# Patient Record
Sex: FEMALE | Race: ASIAN | NOT HISPANIC OR LATINO | URBAN - METROPOLITAN AREA
[De-identification: names, ages, dates, MRNs, and addresses within clinical notes are randomized per-mention and may not be internally consistent; named-entity substitution may affect disease eponyms.]

---

## 2020-05-02 ENCOUNTER — EMERGENCY (EMERGENCY)
Facility: HOSPITAL | Age: 30
LOS: 1 days | Discharge: HOME | End: 2020-05-02
Attending: EMERGENCY MEDICINE | Admitting: EMERGENCY MEDICINE
Payer: MEDICAID

## 2020-05-02 VITALS
RESPIRATION RATE: 15 BRPM | HEART RATE: 80 BPM | OXYGEN SATURATION: 99 % | TEMPERATURE: 99 F | DIASTOLIC BLOOD PRESSURE: 71 MMHG | SYSTOLIC BLOOD PRESSURE: 120 MMHG

## 2020-05-02 VITALS
HEART RATE: 100 BPM | RESPIRATION RATE: 16 BRPM | OXYGEN SATURATION: 100 % | TEMPERATURE: 98 F | SYSTOLIC BLOOD PRESSURE: 128 MMHG | DIASTOLIC BLOOD PRESSURE: 58 MMHG

## 2020-05-02 LAB
APPEARANCE UR: SIGNIFICANT CHANGE UP
BILIRUB UR-MCNC: NEGATIVE — SIGNIFICANT CHANGE UP
BLOOD UR QL VISUAL: NEGATIVE — SIGNIFICANT CHANGE UP
COLOR SPEC: YELLOW — SIGNIFICANT CHANGE UP
GLUCOSE UR-MCNC: NEGATIVE — SIGNIFICANT CHANGE UP
HCG UR-SCNC: POSITIVE — SIGNIFICANT CHANGE UP
KETONES UR-MCNC: NEGATIVE — SIGNIFICANT CHANGE UP
LEUKOCYTE ESTERASE UR-ACNC: SIGNIFICANT CHANGE UP
NITRITE UR-MCNC: NEGATIVE — SIGNIFICANT CHANGE UP
PH UR: 7.5 — SIGNIFICANT CHANGE UP (ref 5–8)
PROT UR-MCNC: SIGNIFICANT CHANGE UP
SP GR SPEC: 1.02 — SIGNIFICANT CHANGE UP (ref 1–1.04)
SP GR UR: 1.02 — SIGNIFICANT CHANGE UP (ref 1–1.04)
UROBILINOGEN FLD QL: NORMAL — SIGNIFICANT CHANGE UP

## 2020-05-02 PROCEDURE — 99285 EMERGENCY DEPT VISIT HI MDM: CPT

## 2020-05-02 RX ORDER — FAMOTIDINE 10 MG/ML
20 INJECTION INTRAVENOUS ONCE
Refills: 0 | Status: COMPLETED | OUTPATIENT
Start: 2020-05-02 | End: 2020-05-02

## 2020-05-02 RX ADMIN — Medication 30 MILLILITER(S): at 12:39

## 2020-05-02 RX ADMIN — FAMOTIDINE 20 MILLIGRAM(S): 10 INJECTION INTRAVENOUS at 12:39

## 2020-05-02 NOTE — ED PROVIDER NOTE - NSFOLLOWUPCLINICS_GEN_ALL_ED_FT
[facility] Gynecology and Obstetrics  Gynceology/OB  [address] [place] [address]  Phone: [phone]  Fax:   Follow Up Time: Urgent

## 2020-05-02 NOTE — ED ADULT NURSE NOTE - OBJECTIVE STATEMENT
Pt presents to ED with epigastric abdominal pain for the past 3 days. Pt AxOx3, ambulatory. pt states the pain is burning in nature and only bothers her after she eats. Pt denies other complaints at this time. pt denies abd pain, n/v/d. pt denies dysuria and hematuria. Skin clean dry and intact. Meds given as per MD orders. MD at bedside performing ultrasound. Will continue to monitor.

## 2020-05-02 NOTE — ED PROVIDER NOTE - PATIENT PORTAL LINK FT
You can access the FollowMyHealth Patient Portal offered by [facility] by registering at the following website: [email or web address]. By joining [employer]’s FollowMyHealth portal, you will also be able to view your health information using other applications (apps) compatible with our system.

## 2020-05-02 NOTE — ED PROVIDER NOTE - OBJECTIVE STATEMENT
30y/o Romanian speaking F  w/ no medical problems p/w epigastric pain. Pt states that she has been gaining weight and having increased abdominal swelling for the past few months a/w epigastric pain for the past 2 weeks. Pt states pain worse with food intake, better when not eating. LMP 19. Has 1 living child in [facility]. [place] from sept[place]. Denies fevers, chills, nausea, vomiting, chest pain, cough, sob, back pain, dysuria, numbness, tingling, vaginal bleeding, sexual activity, smoking, alcohol intake. 28y/o Yi speaking F  w/ no medical problems p/w epigastric pain. Pt states that she has been gaining weight and having increased abdominal swelling for the past few months a/w epigastric pain for the past 2 weeks. Pt states pain worse with food intake, better when not eating. LMP 19. Has 1 living child in [place] [facility]. [place] from sept[place]. Denies trauma fevers, chills, nausea, vomiting, chest pain, cough, sob, back pain, dysuria, numbness, tingling, vaginal bleeding, sexual activity, smoking, alcohol intake.

## 2020-05-02 NOTE — ED ADULT TRIAGE NOTE - CHIEF COMPLAINT QUOTE
epigastric pain x 3 wks. denies n,v,problems urinating.   pain increases upon eating. denies fever,cough.  lmp 11/7 depo injection

## 2020-05-02 NOTE — ED PROVIDER NOTE - CLINICAL SUMMARY MEDICAL DECISION MAKING FREE TEXT BOX
Maria Esther: No PMH, p/w abd swelling over months and epig pain, worse w/ eating, better w/o food. Last sexual activity ~7 months ago. Suprapubic tenderness. Urine HCG positive. Check US, give GERD treatment, check UA and Cx.

## 2020-05-02 NOTE — ED PROVIDER NOTE - NS ED ROS FT
GENERAL: denies fever, chills  HEENT: denies headaches, lightheadedness, dizziness  CARDIAC: denies chest pain, palpitations  PULM: denies SOB, cough  GI: + abdominal pain; denies nausea, vomiting, diarrhea, constipation, melena, hematochezia  : denies dysuria, frequency, incontinence, hematuria  NEURO: denies motor weakness, sensory changes  MSK: denies joint, muscle pain  SKIN: denies new rashes, hives  HEME: denies active bleeding, bruising

## 2020-05-02 NOTE — ED PROVIDER NOTE - ATTENDING CONTRIBUTION TO CARE
I performed a face-to-face evaluation of the patient and performed a history and physical examination. I agree with the history and physical examination.    No PMH, p/w abd swelling over months and epig pain, worse w/ eating, better w/o food. Last sexual activity ~7 months ago. Suprapubic tenderness. Urine HCG positive. Check US, give GERD treatment, check UA and Cx.

## 2020-05-02 NOTE — ED PROVIDER NOTE - PROGRESS NOTE DETAILS
Pt informed of positive urine pregnancy and viable fetus on POCUS. Pt states pregnancy unwanted and looking for options. Will contact ob for official consult.   -Eugene Saba, PGY-1 spoke with obgyn consult resident who states due to her last menstrual period not eligible for elective  at [facility]. pt needs to contact other facility to see if they can perform elective  elsewhere.   -Eugene Saba, PGY-1 spoke with obgyn consult resident who states due to her last menstrual period not eligible for elective  at [facility]. pt needs to contact other facility to see if they can perform elective  elsewhere. pt cannot get ultrasound in house because she is over 20wks pregnant.  -Eugene Saba, PGY-1 Pt abdominal pain improved after medications Pt abdominal pain improved after medications.  [id number] used to explain pregnancy and what discharge instructions were necessary going forward. VSS. Time was taken to answer all of questions and concerns.  -Eugene Saba, PGY-1

## 2020-05-02 NOTE — ED PROVIDER NOTE - PHYSICAL EXAMINATION
GENERAL: no acute distress, non-toxic appearing, AAOx3  HEAD: normocephalic, atraumatic  HEENT: normal conjunctiva, oral mucosa moist, neck supple  CARDIAC: tachycardia and regular rhythm, normal S1 and S2, no appreciable murmurs  PULM: normal breath sounds, clear to ascultation bilaterally, no rales, rhonchi, or wheezing  GI: gravid abdomen nondistended, soft, nontender, no guarding or rebound tenderness  : +mild suprapubic tenderness, no CVA tenderness b/l  NEURO: no focal motor or sensory deficits  MSK: no peripheral edema, no calf tenderness b/l, no visible deformities  SKIN: well-perfused, extremities warm, no visible rashes  PSYCH: appropriate mood and affect GENERAL: no acute distress, non-toxic appearing, AAOx3  HEAD: normocephalic, atraumatic  HEENT: normal conjunctiva, oral mucosa moist, neck supple  CARDIAC: tachycardia and regular rhythm, normal S1 and S2, no appreciable murmurs  PULM: normal breath sounds, clear to ascultation bilaterally, no rales, rhonchi, or wheezing  GI: +gravid abdomen nondistended, soft, nontender, no guarding or rebound tenderness  : +mild suprapubic tenderness, no CVA tenderness b/l  NEURO: no focal motor or sensory deficits  MSK: no peripheral edema, no calf tenderness b/l, no visible deformities  SKIN: well-perfused, extremities warm, no visible rashes  PSYCH: appropriate mood and affect

## 2020-05-02 NOTE — ED PROVIDER NOTE - NSFOLLOWUPINSTRUCTIONS_ED_ALL_ED_FT
Testing Unit Please call  Testing Unit [phone] for outpatient appointment on Monday    Please call [place] [phone] or [phone] TODAY to find options for further care Please call  Testing Unit [phone] for outpatient appointment on Monday    Please call [place] [phone] or [phone] to find options for further care TODAY Please call  Testing Unit [phone] for outpatient appointment on Monday    Please call [place] [phone] or [phone] to find options for further care TODAY    Please take OTC Maalox and Pepcid for pain relief    SEEK IMMEDIATE MEDICAL CARE IF YOU HAVE ANY OF THE FOLLOWING SYMPTOMS: heavy vaginal bleeding, flow containing, clots, severe low back or abdominal cramps, fever/chills, develop a rash, lose consciousness, or lightheadedness/dizziness.

## 2020-05-03 LAB
CULTURE RESULTS: SIGNIFICANT CHANGE UP
SPECIMEN SOURCE: SIGNIFICANT CHANGE UP

## 2020-06-18 ENCOUNTER — RESULT REVIEW (OUTPATIENT)
Age: 30
End: 2020-06-18

## 2020-06-19 ENCOUNTER — APPOINTMENT (OUTPATIENT)
Dept: OBGYN | Facility: CLINIC | Age: 30
End: 2020-06-19
Payer: MEDICAID

## 2020-06-19 ENCOUNTER — OUTPATIENT (OUTPATIENT)
Dept: OUTPATIENT SERVICES | Facility: HOSPITAL | Age: 30
LOS: 1 days | End: 2020-06-19
Payer: MEDICAID

## 2020-06-19 ENCOUNTER — NON-APPOINTMENT (OUTPATIENT)
Age: 30
End: 2020-06-19

## 2020-06-19 VITALS
HEART RATE: 98 BPM | SYSTOLIC BLOOD PRESSURE: 103 MMHG | OXYGEN SATURATION: 98 % | DIASTOLIC BLOOD PRESSURE: 69 MMHG | BODY MASS INDEX: 30.18 KG/M2 | WEIGHT: 164 LBS | HEIGHT: 62 IN | RESPIRATION RATE: 18 BRPM | TEMPERATURE: 98.5 F

## 2020-06-19 DIAGNOSIS — Z34.00 ENCOUNTER FOR SUPERVISION OF NORMAL FIRST PREGNANCY, UNSPECIFIED TRIMESTER: ICD-10-CM

## 2020-06-19 PROCEDURE — 81001 URINALYSIS AUTO W/SCOPE: CPT

## 2020-06-19 PROCEDURE — 88175 CYTOPATH C/V AUTO FLUID REDO: CPT

## 2020-06-19 PROCEDURE — 86850 RBC ANTIBODY SCREEN: CPT

## 2020-06-19 PROCEDURE — 81003 URINALYSIS AUTO W/O SCOPE: CPT

## 2020-06-19 PROCEDURE — 81025 URINE PREGNANCY TEST: CPT

## 2020-06-19 PROCEDURE — 86900 BLOOD TYPING SEROLOGIC ABO: CPT

## 2020-06-19 PROCEDURE — 83036 HEMOGLOBIN GLYCOSYLATED A1C: CPT

## 2020-06-19 PROCEDURE — 87086 URINE CULTURE/COLONY COUNT: CPT

## 2020-06-19 PROCEDURE — 36415 COLL VENOUS BLD VENIPUNCTURE: CPT

## 2020-06-19 PROCEDURE — 86901 BLOOD TYPING SEROLOGIC RH(D): CPT

## 2020-06-19 PROCEDURE — 99203 OFFICE O/P NEW LOW 30 MIN: CPT | Mod: TH

## 2020-06-19 PROCEDURE — 81220 CFTR GENE COM VARIANTS: CPT

## 2020-06-19 PROCEDURE — G0463: CPT

## 2020-06-19 RX ORDER — PNV NO.95/FERROUS FUM/FOLIC AC 28MG-0.8MG
TABLET ORAL
Refills: 0 | Status: COMPLETED | COMMUNITY
End: 2020-06-19

## 2020-06-20 LAB
A1C WITH ESTIMATED AVERAGE GLUCOSE RESULT: 5.3 % — SIGNIFICANT CHANGE UP (ref 4–5.6)
APPEARANCE UR: ABNORMAL
BACTERIA # UR AUTO: NEGATIVE — SIGNIFICANT CHANGE UP
BILIRUB UR-MCNC: NEGATIVE — SIGNIFICANT CHANGE UP
COD CRY URNS QL: ABNORMAL
COLOR SPEC: SIGNIFICANT CHANGE UP
DIFF PNL FLD: NEGATIVE — SIGNIFICANT CHANGE UP
EPI CELLS # UR: >27 /HPF — HIGH (ref 0–5)
ESTIMATED AVERAGE GLUCOSE: 105 MG/DL — SIGNIFICANT CHANGE UP (ref 68–114)
GLUCOSE UR QL: NEGATIVE — SIGNIFICANT CHANGE UP
HYALINE CASTS # UR AUTO: 0 /LPF — SIGNIFICANT CHANGE UP (ref 0–7)
KETONES UR-MCNC: NEGATIVE — SIGNIFICANT CHANGE UP
LEUKOCYTE ESTERASE UR-ACNC: NEGATIVE — SIGNIFICANT CHANGE UP
NITRITE UR-MCNC: NEGATIVE — SIGNIFICANT CHANGE UP
PH UR: 6.5 — SIGNIFICANT CHANGE UP (ref 5–8)
PROT UR-MCNC: NEGATIVE — SIGNIFICANT CHANGE UP
RBC CASTS # UR COMP ASSIST: 2 /HPF — SIGNIFICANT CHANGE UP (ref 0–4)
SP GR SPEC: 1.01 — SIGNIFICANT CHANGE UP (ref 1.01–1.02)
UROBILINOGEN FLD QL: SIGNIFICANT CHANGE UP
WBC UR QL: 3 /HPF — SIGNIFICANT CHANGE UP (ref 0–5)

## 2020-06-22 DIAGNOSIS — O09.33 SUPERVISION OF PREGNANCY WITH INSUFFICIENT ANTENATAL CARE, THIRD TRIMESTER: ICD-10-CM

## 2020-06-22 DIAGNOSIS — O99.019 ANEMIA COMPLICATING PREGNANCY, UNSPECIFIED TRIMESTER: ICD-10-CM

## 2020-06-22 DIAGNOSIS — Z3A.33 33 WEEKS GESTATION OF PREGNANCY: ICD-10-CM

## 2020-06-22 DIAGNOSIS — Z34.93 ENCOUNTER FOR SUPERVISION OF NORMAL PREGNANCY, UNSPECIFIED, THIRD TRIMESTER: ICD-10-CM

## 2020-06-22 DIAGNOSIS — Z23 ENCOUNTER FOR IMMUNIZATION: ICD-10-CM

## 2020-06-22 LAB
CULTURE RESULTS: SIGNIFICANT CHANGE UP
SPECIMEN SOURCE: SIGNIFICANT CHANGE UP

## 2020-06-23 ENCOUNTER — ASOB RESULT (OUTPATIENT)
Age: 30
End: 2020-06-23

## 2020-06-23 ENCOUNTER — APPOINTMENT (OUTPATIENT)
Dept: ANTEPARTUM | Facility: CLINIC | Age: 30
End: 2020-06-23
Payer: MEDICAID

## 2020-06-23 PROCEDURE — 76805 OB US >/= 14 WKS SNGL FETUS: CPT

## 2020-06-29 LAB — CYSTIC FIBROSIS EXPANDED PANEL: SIGNIFICANT CHANGE UP

## 2020-07-01 ENCOUNTER — NON-APPOINTMENT (OUTPATIENT)
Age: 30
End: 2020-07-01

## 2020-07-01 ENCOUNTER — APPOINTMENT (OUTPATIENT)
Dept: OBGYN | Facility: CLINIC | Age: 30
End: 2020-07-01
Payer: MEDICAID

## 2020-07-01 ENCOUNTER — OUTPATIENT (OUTPATIENT)
Dept: OUTPATIENT SERVICES | Facility: HOSPITAL | Age: 30
LOS: 1 days | End: 2020-07-01
Payer: MEDICAID

## 2020-07-01 VITALS
TEMPERATURE: 98.3 F | RESPIRATION RATE: 18 BRPM | HEIGHT: 62 IN | DIASTOLIC BLOOD PRESSURE: 77 MMHG | WEIGHT: 167.2 LBS | OXYGEN SATURATION: 99 % | SYSTOLIC BLOOD PRESSURE: 114 MMHG | BODY MASS INDEX: 30.77 KG/M2 | HEART RATE: 98 BPM

## 2020-07-01 DIAGNOSIS — Z34.00 ENCOUNTER FOR SUPERVISION OF NORMAL FIRST PREGNANCY, UNSPECIFIED TRIMESTER: ICD-10-CM

## 2020-07-01 LAB
BASOPHILS # BLD AUTO: 0.02 K/UL — SIGNIFICANT CHANGE UP (ref 0–0.2)
BASOPHILS NFR BLD AUTO: 0.2 % — SIGNIFICANT CHANGE UP (ref 0–2)
EOSINOPHIL # BLD AUTO: 0.07 K/UL — SIGNIFICANT CHANGE UP (ref 0–0.5)
EOSINOPHIL NFR BLD AUTO: 0.8 % — SIGNIFICANT CHANGE UP (ref 0–6)
HCT VFR BLD CALC: 34.9 % — SIGNIFICANT CHANGE UP (ref 34.5–45)
HGB BLD-MCNC: 11.2 G/DL — LOW (ref 11.5–15.5)
HIV 1+2 AB+HIV1 P24 AG SERPL QL IA: SIGNIFICANT CHANGE UP
IMM GRANULOCYTES NFR BLD AUTO: 1.3 % — SIGNIFICANT CHANGE UP (ref 0–1.5)
LYMPHOCYTES # BLD AUTO: 1.89 K/UL — SIGNIFICANT CHANGE UP (ref 1–3.3)
LYMPHOCYTES # BLD AUTO: 20.6 % — SIGNIFICANT CHANGE UP (ref 13–44)
MCHC RBC-ENTMCNC: 30.3 PG — SIGNIFICANT CHANGE UP (ref 27–34)
MCHC RBC-ENTMCNC: 32.1 GM/DL — SIGNIFICANT CHANGE UP (ref 32–36)
MCV RBC AUTO: 94.3 FL — SIGNIFICANT CHANGE UP (ref 80–100)
MONOCYTES # BLD AUTO: 0.61 K/UL — SIGNIFICANT CHANGE UP (ref 0–0.9)
MONOCYTES NFR BLD AUTO: 6.6 % — SIGNIFICANT CHANGE UP (ref 2–14)
NEUTROPHILS # BLD AUTO: 6.47 K/UL — SIGNIFICANT CHANGE UP (ref 1.8–7.4)
NEUTROPHILS NFR BLD AUTO: 70.5 % — SIGNIFICANT CHANGE UP (ref 43–77)
PLATELET # BLD AUTO: 220 K/UL — SIGNIFICANT CHANGE UP (ref 150–400)
RBC # BLD: 3.7 M/UL — LOW (ref 3.8–5.2)
RBC # FLD: 13.7 % — SIGNIFICANT CHANGE UP (ref 10.3–14.5)
WBC # BLD: 9.18 K/UL — SIGNIFICANT CHANGE UP (ref 3.8–10.5)
WBC # FLD AUTO: 9.18 K/UL — SIGNIFICANT CHANGE UP (ref 3.8–10.5)

## 2020-07-01 PROCEDURE — 87653 STREP B DNA AMP PROBE: CPT

## 2020-07-01 PROCEDURE — G0463: CPT

## 2020-07-01 PROCEDURE — 81003 URINALYSIS AUTO W/O SCOPE: CPT

## 2020-07-01 PROCEDURE — 87389 HIV-1 AG W/HIV-1&-2 AB AG IA: CPT

## 2020-07-01 PROCEDURE — 86780 TREPONEMA PALLIDUM: CPT

## 2020-07-01 PROCEDURE — 99213 OFFICE O/P EST LOW 20 MIN: CPT | Mod: TH

## 2020-07-01 PROCEDURE — 87491 CHLMYD TRACH DNA AMP PROBE: CPT

## 2020-07-01 PROCEDURE — 85027 COMPLETE CBC AUTOMATED: CPT

## 2020-07-01 PROCEDURE — 87591 N.GONORRHOEAE DNA AMP PROB: CPT

## 2020-07-02 DIAGNOSIS — O09.33 SUPERVISION OF PREGNANCY WITH INSUFFICIENT ANTENATAL CARE, THIRD TRIMESTER: ICD-10-CM

## 2020-07-02 DIAGNOSIS — Z3A.35 35 WEEKS GESTATION OF PREGNANCY: ICD-10-CM

## 2020-07-02 DIAGNOSIS — O99.019 ANEMIA COMPLICATING PREGNANCY, UNSPECIFIED TRIMESTER: ICD-10-CM

## 2020-07-02 LAB
GROUP B BETA STREP DNA (PCR): SIGNIFICANT CHANGE UP
GROUP B BETA STREP INTERPRETATION: SIGNIFICANT CHANGE UP
SOURCE GROUP B STREP: SIGNIFICANT CHANGE UP
T PALLIDUM AB TITR SER: NEGATIVE — SIGNIFICANT CHANGE UP

## 2020-07-03 LAB
C TRACH RRNA SPEC QL NAA+PROBE: SIGNIFICANT CHANGE UP
N GONORRHOEA RRNA SPEC QL NAA+PROBE: SIGNIFICANT CHANGE UP
SPECIMEN SOURCE: SIGNIFICANT CHANGE UP

## 2020-07-15 ENCOUNTER — OUTPATIENT (OUTPATIENT)
Dept: OUTPATIENT SERVICES | Facility: HOSPITAL | Age: 30
LOS: 1 days | End: 2020-07-15
Payer: MEDICAID

## 2020-07-15 ENCOUNTER — NON-APPOINTMENT (OUTPATIENT)
Age: 30
End: 2020-07-15

## 2020-07-15 ENCOUNTER — APPOINTMENT (OUTPATIENT)
Dept: OBGYN | Facility: CLINIC | Age: 30
End: 2020-07-15
Payer: MEDICAID

## 2020-07-15 VITALS
SYSTOLIC BLOOD PRESSURE: 117 MMHG | HEIGHT: 62 IN | BODY MASS INDEX: 31.65 KG/M2 | DIASTOLIC BLOOD PRESSURE: 82 MMHG | WEIGHT: 172 LBS

## 2020-07-15 VITALS — TEMPERATURE: 98.1 F | RESPIRATION RATE: 17 BRPM | OXYGEN SATURATION: 99 %

## 2020-07-15 DIAGNOSIS — Z34.00 ENCOUNTER FOR SUPERVISION OF NORMAL FIRST PREGNANCY, UNSPECIFIED TRIMESTER: ICD-10-CM

## 2020-07-15 PROCEDURE — 81003 URINALYSIS AUTO W/O SCOPE: CPT

## 2020-07-15 PROCEDURE — 99213 OFFICE O/P EST LOW 20 MIN: CPT | Mod: TH

## 2020-07-15 PROCEDURE — G0463: CPT

## 2020-07-16 DIAGNOSIS — O24.419 GESTATIONAL DIABETES MELLITUS IN PREGNANCY, UNSPECIFIED CONTROL: ICD-10-CM

## 2020-07-16 DIAGNOSIS — N84.1 POLYP OF CERVIX UTERI: ICD-10-CM

## 2020-07-16 DIAGNOSIS — Z3A.36 36 WEEKS GESTATION OF PREGNANCY: ICD-10-CM

## 2020-07-21 ENCOUNTER — APPOINTMENT (OUTPATIENT)
Dept: ANTEPARTUM | Facility: CLINIC | Age: 30
End: 2020-07-21
Payer: MEDICAID

## 2020-07-21 ENCOUNTER — ASOB RESULT (OUTPATIENT)
Age: 30
End: 2020-07-21

## 2020-07-21 PROCEDURE — 76816 OB US FOLLOW-UP PER FETUS: CPT

## 2020-07-29 ENCOUNTER — OUTPATIENT (OUTPATIENT)
Dept: OUTPATIENT SERVICES | Facility: HOSPITAL | Age: 30
LOS: 1 days | End: 2020-07-29
Payer: MEDICAID

## 2020-07-29 ENCOUNTER — APPOINTMENT (OUTPATIENT)
Dept: OBGYN | Facility: CLINIC | Age: 30
End: 2020-07-29
Payer: MEDICAID

## 2020-07-29 ENCOUNTER — NON-APPOINTMENT (OUTPATIENT)
Age: 30
End: 2020-07-29

## 2020-07-29 VITALS
BODY MASS INDEX: 31.83 KG/M2 | SYSTOLIC BLOOD PRESSURE: 123 MMHG | DIASTOLIC BLOOD PRESSURE: 87 MMHG | WEIGHT: 173 LBS | HEIGHT: 62 IN

## 2020-07-29 VITALS — TEMPERATURE: 99 F | OXYGEN SATURATION: 100 %

## 2020-07-29 DIAGNOSIS — Z34.00 ENCOUNTER FOR SUPERVISION OF NORMAL FIRST PREGNANCY, UNSPECIFIED TRIMESTER: ICD-10-CM

## 2020-07-29 PROCEDURE — G0463: CPT

## 2020-07-29 PROCEDURE — 99213 OFFICE O/P EST LOW 20 MIN: CPT | Mod: TH

## 2020-07-29 PROCEDURE — 81003 URINALYSIS AUTO W/O SCOPE: CPT

## 2020-07-30 DIAGNOSIS — Z3A.39 39 WEEKS GESTATION OF PREGNANCY: ICD-10-CM

## 2020-07-30 DIAGNOSIS — O09.33 SUPERVISION OF PREGNANCY WITH INSUFFICIENT ANTENATAL CARE, THIRD TRIMESTER: ICD-10-CM

## 2020-07-30 DIAGNOSIS — O99.019 ANEMIA COMPLICATING PREGNANCY, UNSPECIFIED TRIMESTER: ICD-10-CM

## 2020-08-04 ENCOUNTER — OUTPATIENT (OUTPATIENT)
Dept: OUTPATIENT SERVICES | Facility: HOSPITAL | Age: 30
LOS: 1 days | End: 2020-08-04
Payer: MEDICAID

## 2020-08-04 DIAGNOSIS — Z3A.00 WEEKS OF GESTATION OF PREGNANCY NOT SPECIFIED: ICD-10-CM

## 2020-08-04 DIAGNOSIS — O26.899 OTHER SPECIFIED PREGNANCY RELATED CONDITIONS, UNSPECIFIED TRIMESTER: ICD-10-CM

## 2020-08-04 PROCEDURE — 59025 FETAL NON-STRESS TEST: CPT

## 2020-08-04 PROCEDURE — 76815 OB US LIMITED FETUS(S): CPT

## 2020-08-04 PROCEDURE — 76819 FETAL BIOPHYS PROFIL W/O NST: CPT | Mod: 26

## 2020-08-04 PROCEDURE — G0463: CPT

## 2020-08-04 PROCEDURE — 76815 OB US LIMITED FETUS(S): CPT | Mod: 26

## 2020-08-04 PROCEDURE — 76819 FETAL BIOPHYS PROFIL W/O NST: CPT

## 2020-08-07 ENCOUNTER — INPATIENT (INPATIENT)
Facility: HOSPITAL | Age: 30
LOS: 0 days | Discharge: HOME | End: 2020-08-08
Attending: OBSTETRICS & GYNECOLOGY | Admitting: OBSTETRICS & GYNECOLOGY
Payer: MEDICAID

## 2020-08-07 ENCOUNTER — TRANSCRIPTION ENCOUNTER (OUTPATIENT)
Age: 30
End: 2020-08-07

## 2020-08-07 VITALS — WEIGHT: 171.08 LBS | HEIGHT: 62 IN

## 2020-08-07 DIAGNOSIS — O26.899 OTHER SPECIFIED PREGNANCY RELATED CONDITIONS, UNSPECIFIED TRIMESTER: ICD-10-CM

## 2020-08-07 DIAGNOSIS — Z3A.00 WEEKS OF GESTATION OF PREGNANCY NOT SPECIFIED: ICD-10-CM

## 2020-08-07 DIAGNOSIS — Z34.80 ENCOUNTER FOR SUPERVISION OF OTHER NORMAL PREGNANCY, UNSPECIFIED TRIMESTER: ICD-10-CM

## 2020-08-07 LAB
APTT BLD: 26.9 SEC — LOW (ref 27.5–35.5)
BASOPHILS # BLD AUTO: 0.03 K/UL — SIGNIFICANT CHANGE UP (ref 0–0.2)
BASOPHILS NFR BLD AUTO: 0.3 % — SIGNIFICANT CHANGE UP (ref 0–2)
BLD GP AB SCN SERPL QL: SIGNIFICANT CHANGE UP
EOSINOPHIL # BLD AUTO: 0.05 K/UL — SIGNIFICANT CHANGE UP (ref 0–0.5)
EOSINOPHIL NFR BLD AUTO: 0.6 % — SIGNIFICANT CHANGE UP (ref 0–6)
FIBRINOGEN PPP-MCNC: 696 MG/DL — HIGH (ref 350–510)
HCT VFR BLD CALC: 34.6 % — SIGNIFICANT CHANGE UP (ref 34.5–45)
HGB BLD-MCNC: 11.5 G/DL — SIGNIFICANT CHANGE UP (ref 11.5–15.5)
IMM GRANULOCYTES NFR BLD AUTO: 0.8 % — SIGNIFICANT CHANGE UP (ref 0–1.5)
INR BLD: 0.89 RATIO — SIGNIFICANT CHANGE UP (ref 0.88–1.16)
LYMPHOCYTES # BLD AUTO: 2.06 K/UL — SIGNIFICANT CHANGE UP (ref 1–3.3)
LYMPHOCYTES # BLD AUTO: 23 % — SIGNIFICANT CHANGE UP (ref 13–44)
MCHC RBC-ENTMCNC: 30.5 PG — SIGNIFICANT CHANGE UP (ref 27–34)
MCHC RBC-ENTMCNC: 33.2 GM/DL — SIGNIFICANT CHANGE UP (ref 32–36)
MCV RBC AUTO: 91.8 FL — SIGNIFICANT CHANGE UP (ref 80–100)
MONOCYTES # BLD AUTO: 0.58 K/UL — SIGNIFICANT CHANGE UP (ref 0–0.9)
MONOCYTES NFR BLD AUTO: 6.5 % — SIGNIFICANT CHANGE UP (ref 2–14)
NEUTROPHILS # BLD AUTO: 6.17 K/UL — SIGNIFICANT CHANGE UP (ref 1.8–7.4)
NEUTROPHILS NFR BLD AUTO: 68.8 % — SIGNIFICANT CHANGE UP (ref 43–77)
NRBC # BLD: 0 /100 WBCS — SIGNIFICANT CHANGE UP (ref 0–0)
PLATELET # BLD AUTO: 184 K/UL — SIGNIFICANT CHANGE UP (ref 150–400)
PROTHROM AB SERPL-ACNC: 10.5 SEC — LOW (ref 10.6–13.6)
RBC # BLD: 3.77 M/UL — LOW (ref 3.8–5.2)
RBC # FLD: 13.7 % — SIGNIFICANT CHANGE UP (ref 10.3–14.5)
SARS-COV-2 IGG SERPL QL IA: NEGATIVE — SIGNIFICANT CHANGE UP
SARS-COV-2 IGM SERPL IA-ACNC: 0.2 RATIO — SIGNIFICANT CHANGE UP
SARS-COV-2 RNA SPEC QL NAA+PROBE: SIGNIFICANT CHANGE UP
T PALLIDUM AB TITR SER: NEGATIVE — SIGNIFICANT CHANGE UP
WBC # BLD: 8.96 K/UL — SIGNIFICANT CHANGE UP (ref 3.8–10.5)
WBC # FLD AUTO: 8.96 K/UL — SIGNIFICANT CHANGE UP (ref 3.8–10.5)

## 2020-08-07 PROCEDURE — 59409 OBSTETRICAL CARE: CPT | Mod: U9

## 2020-08-07 RX ORDER — IBUPROFEN 200 MG
600 TABLET ORAL EVERY 6 HOURS
Refills: 0 | Status: DISCONTINUED | OUTPATIENT
Start: 2020-08-07 | End: 2020-08-08

## 2020-08-07 RX ORDER — OXYTOCIN 10 UNIT/ML
333.33 VIAL (ML) INJECTION
Qty: 20 | Refills: 0 | Status: DISCONTINUED | OUTPATIENT
Start: 2020-08-07 | End: 2020-08-08

## 2020-08-07 RX ORDER — SODIUM CHLORIDE 9 MG/ML
3 INJECTION INTRAMUSCULAR; INTRAVENOUS; SUBCUTANEOUS EVERY 8 HOURS
Refills: 0 | Status: DISCONTINUED | OUTPATIENT
Start: 2020-08-07 | End: 2020-08-08

## 2020-08-07 RX ORDER — OXYTOCIN 10 UNIT/ML
2 VIAL (ML) INJECTION
Qty: 30 | Refills: 0 | Status: DISCONTINUED | OUTPATIENT
Start: 2020-08-07 | End: 2020-08-07

## 2020-08-07 RX ORDER — OXYCODONE HYDROCHLORIDE 5 MG/1
5 TABLET ORAL ONCE
Refills: 0 | Status: DISCONTINUED | OUTPATIENT
Start: 2020-08-07 | End: 2020-08-08

## 2020-08-07 RX ORDER — LANOLIN
1 OINTMENT (GRAM) TOPICAL EVERY 6 HOURS
Refills: 0 | Status: DISCONTINUED | OUTPATIENT
Start: 2020-08-07 | End: 2020-08-08

## 2020-08-07 RX ORDER — OXYTOCIN 10 UNIT/ML
6 VIAL (ML) INJECTION
Qty: 30 | Refills: 0 | Status: DISCONTINUED | OUTPATIENT
Start: 2020-08-07 | End: 2020-08-08

## 2020-08-07 RX ORDER — PRAMOXINE HYDROCHLORIDE 150 MG/15G
1 AEROSOL, FOAM RECTAL EVERY 4 HOURS
Refills: 0 | Status: DISCONTINUED | OUTPATIENT
Start: 2020-08-07 | End: 2020-08-08

## 2020-08-07 RX ORDER — HYDROCORTISONE 1 %
1 OINTMENT (GRAM) TOPICAL EVERY 6 HOURS
Refills: 0 | Status: DISCONTINUED | OUTPATIENT
Start: 2020-08-07 | End: 2020-08-08

## 2020-08-07 RX ORDER — DIBUCAINE 1 %
1 OINTMENT (GRAM) RECTAL EVERY 6 HOURS
Refills: 0 | Status: DISCONTINUED | OUTPATIENT
Start: 2020-08-07 | End: 2020-08-08

## 2020-08-07 RX ORDER — SIMETHICONE 80 MG/1
80 TABLET, CHEWABLE ORAL EVERY 4 HOURS
Refills: 0 | Status: DISCONTINUED | OUTPATIENT
Start: 2020-08-07 | End: 2020-08-08

## 2020-08-07 RX ORDER — AER TRAVELER 0.5 G/1
1 SOLUTION RECTAL; TOPICAL EVERY 4 HOURS
Refills: 0 | Status: DISCONTINUED | OUTPATIENT
Start: 2020-08-07 | End: 2020-08-08

## 2020-08-07 RX ORDER — OXYCODONE HYDROCHLORIDE 5 MG/1
5 TABLET ORAL
Refills: 0 | Status: DISCONTINUED | OUTPATIENT
Start: 2020-08-07 | End: 2020-08-08

## 2020-08-07 RX ORDER — DIPHENHYDRAMINE HCL 50 MG
25 CAPSULE ORAL EVERY 6 HOURS
Refills: 0 | Status: DISCONTINUED | OUTPATIENT
Start: 2020-08-07 | End: 2020-08-08

## 2020-08-07 RX ORDER — SODIUM CHLORIDE 9 MG/ML
1000 INJECTION, SOLUTION INTRAVENOUS
Refills: 0 | Status: DISCONTINUED | OUTPATIENT
Start: 2020-08-07 | End: 2020-08-07

## 2020-08-07 RX ORDER — BENZOCAINE 10 %
1 GEL (GRAM) MUCOUS MEMBRANE EVERY 6 HOURS
Refills: 0 | Status: DISCONTINUED | OUTPATIENT
Start: 2020-08-07 | End: 2020-08-08

## 2020-08-07 RX ORDER — CITRIC ACID/SODIUM CITRATE 300-500 MG
15 SOLUTION, ORAL ORAL EVERY 6 HOURS
Refills: 0 | Status: DISCONTINUED | OUTPATIENT
Start: 2020-08-07 | End: 2020-08-07

## 2020-08-07 RX ORDER — TETANUS TOXOID, REDUCED DIPHTHERIA TOXOID AND ACELLULAR PERTUSSIS VACCINE, ADSORBED 5; 2.5; 8; 8; 2.5 [IU]/.5ML; [IU]/.5ML; UG/.5ML; UG/.5ML; UG/.5ML
0.5 SUSPENSION INTRAMUSCULAR ONCE
Refills: 0 | Status: DISCONTINUED | OUTPATIENT
Start: 2020-08-07 | End: 2020-08-08

## 2020-08-07 RX ORDER — ACETAMINOPHEN 500 MG
975 TABLET ORAL
Refills: 0 | Status: DISCONTINUED | OUTPATIENT
Start: 2020-08-07 | End: 2020-08-08

## 2020-08-07 RX ORDER — KETOROLAC TROMETHAMINE 30 MG/ML
30 SYRINGE (ML) INJECTION ONCE
Refills: 0 | Status: DISCONTINUED | OUTPATIENT
Start: 2020-08-07 | End: 2020-08-08

## 2020-08-07 RX ORDER — MAGNESIUM HYDROXIDE 400 MG/1
30 TABLET, CHEWABLE ORAL
Refills: 0 | Status: DISCONTINUED | OUTPATIENT
Start: 2020-08-07 | End: 2020-08-08

## 2020-08-07 RX ADMIN — Medication 975 MILLIGRAM(S): at 23:00

## 2020-08-07 RX ADMIN — Medication 6 MILLIUNIT(S)/MIN: at 11:53

## 2020-08-07 RX ADMIN — Medication 1000 MILLIUNIT(S)/MIN: at 14:12

## 2020-08-07 RX ADMIN — SODIUM CHLORIDE 125 MILLILITER(S): 9 INJECTION, SOLUTION INTRAVENOUS at 07:43

## 2020-08-07 RX ADMIN — SODIUM CHLORIDE 3 MILLILITER(S): 9 INJECTION INTRAMUSCULAR; INTRAVENOUS; SUBCUTANEOUS at 22:25

## 2020-08-07 RX ADMIN — Medication 975 MILLIGRAM(S): at 22:25

## 2020-08-07 NOTE — DISCHARGE NOTE OB - MATERIALS PROVIDED
Back To Sleep Handout/Shaken Baby Prevention Handout/Breastfeeding Guide and Packet/[facility]  Screening Program/Bottle Feeding Log/Breastfeeding Log/Breastfeeding Mother’s Support Group Information/Guide to Postpartum Care/[place]  Immunization Record/Vaccinations/[facility] Hearing Screen Program/Prescription for Breast Pump

## 2020-08-07 NOTE — DISCHARGE NOTE OB - CARE PLAN
Principal Discharge DX:	 (normal spontaneous vaginal delivery)  Goal:	ob check in 5weeks call and set up appointment  Assessment and plan of treatment:	no sex nothing in vagina no heavy lifting no pushing no straining no strenuous activities  pain medication as needed; stool softener; dulcolax as needed if constipated  walk for exercise: helps recovery   continue prenatal vitamins daily especially whole course of breastfeeding  see your OB in the office for follow up post partum check 4-5weeks call the office to set up an appointment Principal Discharge DX:	 (normal spontaneous vaginal delivery)  Goal:	ob check in 5weeks call and set up appointment  Assessment and plan of treatment:	no sex nothing in vagina no heavy lifting no pushing no straining no strenuous activities  pain medication as needed; stool softener; dulcolax as needed if constipated  walk for exercise: helps recovery   continue prenatal vitamins daily especially whole course of breastfeeding  see your OB in the office for follow up post partum check 4-5weeks call the office to set up an appointment  Secondary Diagnosis:	Pregnant

## 2020-08-07 NOTE — DISCHARGE NOTE OB - PLAN OF CARE
ob check in 5weeks call and set up appointment no sex nothing in vagina no heavy lifting no pushing no straining no strenuous activities  pain medication as needed; stool softener; dulcolax as needed if constipated  walk for exercise: helps recovery   continue prenatal vitamins daily especially whole course of breastfeeding  see your OB in the office for follow up post partum check 4-5weeks call the office to set up an appointment

## 2020-08-07 NOTE — DISCHARGE NOTE OB - CARE PROVIDER_API CALL
Radha Jenkins  OBSTETRICS AND GYNECOLOGY  [address] [place] [address]  Phone: [phone]  Fax: [phone]  Follow Up Time: 1 month

## 2020-08-07 NOTE — DISCHARGE NOTE OB - PATIENT PORTAL LINK FT
none You can access the FollowMyHealth Patient Portal offered by [facility] by registering at the following website: [email or web address]. By joining [employer]’s FollowMyHealth portal, you will also be able to view your health information using other applications (apps) compatible with our system.

## 2020-08-07 NOTE — DISCHARGE NOTE OB - MEDICATION SUMMARY - MEDICATIONS TO TAKE
I will START or STAY ON the medications listed below when I get home from the hospital:    ibuprofen 600 mg oral tablet  -- 1 tab(s) by mouth every 6 hours  -- Do not take this drug if you are pregnant.  It is very important that you take or use this exactly as directed.  Do not skip doses or discontinue unless directed by your doctor.  May cause drowsiness or dizziness.  Obtain medical advice before taking any non-prescription drugs as some may affect the action of this medication.  Take with food or milk.    -- Indication: For pain    ferrous sulfate 325 mg (65 mg elemental iron) oral tablet  -- 1 tab(s) by mouth 2 times a day  -- Check with your doctor before becoming pregnant.  Do not chew, break, or crush.  May discolor urine or feces.    -- Indication: For Supplement    Colace 100 mg oral capsule  -- 1 cap(s) by mouth 2 times a day  -- Medication should be taken with plenty of water.    -- Indication: For constipation

## 2020-08-08 VITALS
TEMPERATURE: 98 F | HEART RATE: 67 BPM | RESPIRATION RATE: 16 BRPM | OXYGEN SATURATION: 99 % | SYSTOLIC BLOOD PRESSURE: 120 MMHG | DIASTOLIC BLOOD PRESSURE: 80 MMHG

## 2020-08-08 LAB
BASOPHILS # BLD AUTO: 0.04 K/UL — SIGNIFICANT CHANGE UP (ref 0–0.2)
BASOPHILS NFR BLD AUTO: 0.3 % — SIGNIFICANT CHANGE UP (ref 0–2)
EOSINOPHIL # BLD AUTO: 0.05 K/UL — SIGNIFICANT CHANGE UP (ref 0–0.5)
EOSINOPHIL NFR BLD AUTO: 0.4 % — SIGNIFICANT CHANGE UP (ref 0–6)
HCT VFR BLD CALC: 28.3 % — LOW (ref 34.5–45)
HGB BLD-MCNC: 9.3 G/DL — LOW (ref 11.5–15.5)
IMM GRANULOCYTES NFR BLD AUTO: 0.5 % — SIGNIFICANT CHANGE UP (ref 0–1.5)
LYMPHOCYTES # BLD AUTO: 19.8 % — SIGNIFICANT CHANGE UP (ref 13–44)
LYMPHOCYTES # BLD AUTO: 2.29 K/UL — SIGNIFICANT CHANGE UP (ref 1–3.3)
MCHC RBC-ENTMCNC: 30 PG — SIGNIFICANT CHANGE UP (ref 27–34)
MCHC RBC-ENTMCNC: 32.9 GM/DL — SIGNIFICANT CHANGE UP (ref 32–36)
MCV RBC AUTO: 91.3 FL — SIGNIFICANT CHANGE UP (ref 80–100)
MONOCYTES # BLD AUTO: 0.79 K/UL — SIGNIFICANT CHANGE UP (ref 0–0.9)
MONOCYTES NFR BLD AUTO: 6.8 % — SIGNIFICANT CHANGE UP (ref 2–14)
NEUTROPHILS # BLD AUTO: 8.34 K/UL — HIGH (ref 1.8–7.4)
NEUTROPHILS NFR BLD AUTO: 72.2 % — SIGNIFICANT CHANGE UP (ref 43–77)
NRBC # BLD: 0 /100 WBCS — SIGNIFICANT CHANGE UP (ref 0–0)
PLATELET # BLD AUTO: 143 K/UL — LOW (ref 150–400)
RBC # BLD: 3.1 M/UL — LOW (ref 3.8–5.2)
RBC # FLD: 13.6 % — SIGNIFICANT CHANGE UP (ref 10.3–14.5)
WBC # BLD: 11.57 K/UL — HIGH (ref 3.8–10.5)
WBC # FLD AUTO: 11.57 K/UL — HIGH (ref 3.8–10.5)

## 2020-08-08 PROCEDURE — 85730 THROMBOPLASTIN TIME PARTIAL: CPT

## 2020-08-08 PROCEDURE — 86780 TREPONEMA PALLIDUM: CPT

## 2020-08-08 PROCEDURE — 86900 BLOOD TYPING SEROLOGIC ABO: CPT

## 2020-08-08 PROCEDURE — 87635 SARS-COV-2 COVID-19 AMP PRB: CPT

## 2020-08-08 PROCEDURE — 85027 COMPLETE CBC AUTOMATED: CPT

## 2020-08-08 PROCEDURE — 36415 COLL VENOUS BLD VENIPUNCTURE: CPT

## 2020-08-08 PROCEDURE — 86769 SARS-COV-2 COVID-19 ANTIBODY: CPT

## 2020-08-08 PROCEDURE — G0463: CPT

## 2020-08-08 PROCEDURE — 85610 PROTHROMBIN TIME: CPT

## 2020-08-08 PROCEDURE — 59025 FETAL NON-STRESS TEST: CPT

## 2020-08-08 PROCEDURE — 59050 FETAL MONITOR W/REPORT: CPT

## 2020-08-08 PROCEDURE — 86901 BLOOD TYPING SEROLOGIC RH(D): CPT

## 2020-08-08 PROCEDURE — 86850 RBC ANTIBODY SCREEN: CPT

## 2020-08-08 PROCEDURE — 85384 FIBRINOGEN ACTIVITY: CPT

## 2020-08-08 RX ORDER — FERROUS SULFATE 325(65) MG
325 TABLET ORAL DAILY
Refills: 0 | Status: DISCONTINUED | OUTPATIENT
Start: 2020-08-08 | End: 2020-08-08

## 2020-08-08 RX ORDER — IBUPROFEN 200 MG
1 TABLET ORAL
Qty: 40 | Refills: 2
Start: 2020-08-08 | End: 2020-09-06

## 2020-08-08 RX ORDER — FERROUS SULFATE 325(65) MG
1 TABLET ORAL
Qty: 60 | Refills: 3
Start: 2020-08-08 | End: 2020-12-05

## 2020-08-08 RX ORDER — DOCUSATE SODIUM 100 MG
1 CAPSULE ORAL
Qty: 60 | Refills: 0
Start: 2020-08-08 | End: 2020-09-06

## 2020-08-08 RX ADMIN — Medication 1 TABLET(S): at 16:34

## 2020-08-08 RX ADMIN — SODIUM CHLORIDE 3 MILLILITER(S): 9 INJECTION INTRAMUSCULAR; INTRAVENOUS; SUBCUTANEOUS at 06:25

## 2020-08-08 RX ADMIN — Medication 325 MILLIGRAM(S): at 16:33

## 2020-08-08 NOTE — PROGRESS NOTE ADULT - PROBLEM SELECTOR PLAN 1
-Continue pain management  -Encourage OOB and ambulation  -Continue current care  -Plan for discharge today  -d/w dr. Chirinos

## 2020-08-08 NOTE — PROGRESS NOTE ADULT - SUBJECTIVE AND OBJECTIVE BOX
Patient seen at bedside resting comfortably offers no current complaints. Ambulating and voiding without difficulty.  Passing flatus and tolerating regular diet.  both breast/bottle feeding . Denies HA, CP, SOB, N/V/D, dizziness, palpitations, worsening abdominal pain, worsening vaginal bleeding, or any other concerns.    Vital Signs Last 24 Hrs  T(C): 36.7 (08 Aug 2020 05:40), Max: 36.9 (07 Aug 2020 14:26)  T(F): 98 (08 Aug 2020 05:40), Max: 98.4 (07 Aug 2020 14:26)  HR: 67 (08 Aug 2020 05:40) (67 - 103)  BP: 120/80 (08 Aug 2020 05:40) (120/80 - 153/63)  BP(mean): 91 (07 Aug 2020 14:26) (91 - 91)  RR: 16 (08 Aug 2020 05:40) (16 - 20)  SpO2: 99% (08 Aug 2020 05:40) (99% - 100%)    Physical Exam:     Gen: A&Ox 3, NAD  Chest: CTA B/L  Cardiac: S1+S2; RRR  Breast: Soft, nontender, nonengorged  Abdomen: +Bs; Soft, nontender,  ND; Fundus firm below umbilicus  Gyn: mod lochia, intact/repaired  Ext: Nontender, DTRS 2+, no worsening edema                          9.3    11.57 )-----------( 143      ( 08 Aug 2020 07:20 )             28.3       A/P: 29 year old PPD#1 s/p     -Continue pain management  -Encourage OOB and ambulation  -Continue current care  -Plan for discharge today  -d/w dr. Chirinos

## 2020-08-10 ENCOUNTER — APPOINTMENT (OUTPATIENT)
Dept: OBGYN | Facility: CLINIC | Age: 30
End: 2020-08-10

## 2020-08-12 ENCOUNTER — INPATIENT (INPATIENT)
Facility: HOSPITAL | Age: 30
LOS: 4 days | Discharge: HOME | End: 2020-08-17
Attending: SPECIALIST | Admitting: SPECIALIST
Payer: MEDICAID

## 2020-08-12 VITALS
OXYGEN SATURATION: 99 % | HEART RATE: 50 BPM | SYSTOLIC BLOOD PRESSURE: 146 MMHG | DIASTOLIC BLOOD PRESSURE: 87 MMHG | HEIGHT: 62 IN | TEMPERATURE: 97 F | RESPIRATION RATE: 18 BRPM

## 2020-08-12 LAB
ALBUMIN SERPL ELPH-MCNC: 2.9 G/DL — LOW (ref 3.3–5)
ALBUMIN SERPL ELPH-MCNC: 3.1 G/DL — LOW (ref 3.3–5)
ALP SERPL-CCNC: 171 U/L — HIGH (ref 40–120)
ALP SERPL-CCNC: 178 U/L — HIGH (ref 40–120)
ALT FLD-CCNC: 111 U/L — HIGH (ref 4–33)
ALT FLD-CCNC: 117 U/L — HIGH (ref 4–33)
ANION GAP SERPL CALC-SCNC: 13 MMO/L — SIGNIFICANT CHANGE UP (ref 7–14)
ANION GAP SERPL CALC-SCNC: 15 MMO/L — HIGH (ref 7–14)
APPEARANCE UR: CLEAR — SIGNIFICANT CHANGE UP
APTT BLD: 28.8 SEC — SIGNIFICANT CHANGE UP (ref 27–36.3)
APTT BLD: 30.7 SEC — SIGNIFICANT CHANGE UP (ref 27–36.3)
AST SERPL-CCNC: 71 U/L — HIGH (ref 4–32)
AST SERPL-CCNC: 75 U/L — HIGH (ref 4–32)
BACTERIA # UR AUTO: NEGATIVE — SIGNIFICANT CHANGE UP
BASE EXCESS BLDV CALC-SCNC: -4.4 MMOL/L — SIGNIFICANT CHANGE UP
BASOPHILS # BLD AUTO: 0.02 K/UL — SIGNIFICANT CHANGE UP (ref 0–0.2)
BASOPHILS # BLD AUTO: 0.03 K/UL — SIGNIFICANT CHANGE UP (ref 0–0.2)
BASOPHILS NFR BLD AUTO: 0.2 % — SIGNIFICANT CHANGE UP (ref 0–2)
BASOPHILS NFR BLD AUTO: 0.4 % — SIGNIFICANT CHANGE UP (ref 0–2)
BILIRUB SERPL-MCNC: 0.4 MG/DL — SIGNIFICANT CHANGE UP (ref 0.2–1.2)
BILIRUB SERPL-MCNC: 0.4 MG/DL — SIGNIFICANT CHANGE UP (ref 0.2–1.2)
BILIRUB UR-MCNC: NEGATIVE — SIGNIFICANT CHANGE UP
BLD GP AB SCN SERPL QL: NEGATIVE — SIGNIFICANT CHANGE UP
BLOOD GAS VENOUS - CREATININE: 0.63 MG/DL — SIGNIFICANT CHANGE UP (ref 0.5–1.3)
BLOOD UR QL VISUAL: HIGH
BUN SERPL-MCNC: 9 MG/DL — SIGNIFICANT CHANGE UP (ref 7–23)
BUN SERPL-MCNC: 9 MG/DL — SIGNIFICANT CHANGE UP (ref 7–23)
CALCIUM SERPL-MCNC: 8.4 MG/DL — SIGNIFICANT CHANGE UP (ref 8.4–10.5)
CALCIUM SERPL-MCNC: 8.6 MG/DL — SIGNIFICANT CHANGE UP (ref 8.4–10.5)
CHLORIDE BLDV-SCNC: 112 MMOL/L — HIGH (ref 96–108)
CHLORIDE SERPL-SCNC: 107 MMOL/L — SIGNIFICANT CHANGE UP (ref 98–107)
CHLORIDE SERPL-SCNC: 109 MMOL/L — HIGH (ref 98–107)
CK MB BLD-MCNC: 1.18 NG/ML — SIGNIFICANT CHANGE UP (ref 1–4.7)
CK SERPL-CCNC: 35 U/L — SIGNIFICANT CHANGE UP (ref 25–170)
CO2 SERPL-SCNC: 19 MMOL/L — LOW (ref 22–31)
CO2 SERPL-SCNC: 20 MMOL/L — LOW (ref 22–31)
COLOR SPEC: COLORLESS — SIGNIFICANT CHANGE UP
CREAT SERPL-MCNC: 0.52 MG/DL — SIGNIFICANT CHANGE UP (ref 0.5–1.3)
CREAT SERPL-MCNC: 0.58 MG/DL — SIGNIFICANT CHANGE UP (ref 0.5–1.3)
EOSINOPHIL # BLD AUTO: 0.16 K/UL — SIGNIFICANT CHANGE UP (ref 0–0.5)
EOSINOPHIL # BLD AUTO: 0.21 K/UL — SIGNIFICANT CHANGE UP (ref 0–0.5)
EOSINOPHIL NFR BLD AUTO: 2 % — SIGNIFICANT CHANGE UP (ref 0–6)
EOSINOPHIL NFR BLD AUTO: 2.7 % — SIGNIFICANT CHANGE UP (ref 0–6)
FIBRINOGEN PPP-MCNC: 606 MG/DL — HIGH (ref 290–520)
FIBRINOGEN PPP-MCNC: 618 MG/DL — HIGH (ref 290–520)
GAS PNL BLDV: 143 MMOL/L — SIGNIFICANT CHANGE UP (ref 136–146)
GLUCOSE BLDV-MCNC: 79 MG/DL — SIGNIFICANT CHANGE UP (ref 70–99)
GLUCOSE SERPL-MCNC: 72 MG/DL — SIGNIFICANT CHANGE UP (ref 70–99)
GLUCOSE SERPL-MCNC: 78 MG/DL — SIGNIFICANT CHANGE UP (ref 70–99)
GLUCOSE UR-MCNC: NEGATIVE — SIGNIFICANT CHANGE UP
HAPTOGLOB SERPL-MCNC: 241 MG/DL — HIGH (ref 34–200)
HCO3 BLDV-SCNC: 21 MMOL/L — SIGNIFICANT CHANGE UP (ref 20–27)
HCT VFR BLD CALC: 28.9 % — LOW (ref 34.5–45)
HCT VFR BLD CALC: 29.7 % — LOW (ref 34.5–45)
HCT VFR BLDV CALC: 30.8 % — LOW (ref 34.5–45)
HGB BLD-MCNC: 9.5 G/DL — LOW (ref 11.5–15.5)
HGB BLD-MCNC: 9.6 G/DL — LOW (ref 11.5–15.5)
HGB BLDV-MCNC: 10 G/DL — LOW (ref 11.5–15.5)
HYALINE CASTS # UR AUTO: HIGH
IMM GRANULOCYTES NFR BLD AUTO: 0.6 % — SIGNIFICANT CHANGE UP (ref 0–1.5)
IMM GRANULOCYTES NFR BLD AUTO: 0.6 % — SIGNIFICANT CHANGE UP (ref 0–1.5)
INR BLD: 0.94 — SIGNIFICANT CHANGE UP (ref 0.88–1.16)
INR BLD: 0.94 — SIGNIFICANT CHANGE UP (ref 0.88–1.16)
KETONES UR-MCNC: NEGATIVE — SIGNIFICANT CHANGE UP
LACTATE BLDV-MCNC: 0.8 MMOL/L — SIGNIFICANT CHANGE UP (ref 0.5–2)
LDH SERPL L TO P-CCNC: 371 U/L — HIGH (ref 135–225)
LDH SERPL L TO P-CCNC: 371 U/L — HIGH (ref 135–225)
LEUKOCYTE ESTERASE UR-ACNC: SIGNIFICANT CHANGE UP
LYMPHOCYTES # BLD AUTO: 1.89 K/UL — SIGNIFICANT CHANGE UP (ref 1–3.3)
LYMPHOCYTES # BLD AUTO: 1.99 K/UL — SIGNIFICANT CHANGE UP (ref 1–3.3)
LYMPHOCYTES # BLD AUTO: 23.6 % — SIGNIFICANT CHANGE UP (ref 13–44)
LYMPHOCYTES # BLD AUTO: 25.3 % — SIGNIFICANT CHANGE UP (ref 13–44)
MAGNESIUM SERPL-MCNC: 2.1 MG/DL — SIGNIFICANT CHANGE UP (ref 1.6–2.6)
MCHC RBC-ENTMCNC: 29.8 PG — SIGNIFICANT CHANGE UP (ref 27–34)
MCHC RBC-ENTMCNC: 30.7 PG — SIGNIFICANT CHANGE UP (ref 27–34)
MCHC RBC-ENTMCNC: 32 % — SIGNIFICANT CHANGE UP (ref 32–36)
MCHC RBC-ENTMCNC: 33.2 % — SIGNIFICANT CHANGE UP (ref 32–36)
MCV RBC AUTO: 92.3 FL — SIGNIFICANT CHANGE UP (ref 80–100)
MCV RBC AUTO: 93.1 FL — SIGNIFICANT CHANGE UP (ref 80–100)
MONOCYTES # BLD AUTO: 0.48 K/UL — SIGNIFICANT CHANGE UP (ref 0–0.9)
MONOCYTES # BLD AUTO: 0.51 K/UL — SIGNIFICANT CHANGE UP (ref 0–0.9)
MONOCYTES NFR BLD AUTO: 6.1 % — SIGNIFICANT CHANGE UP (ref 2–14)
MONOCYTES NFR BLD AUTO: 6.4 % — SIGNIFICANT CHANGE UP (ref 2–14)
NEUTROPHILS # BLD AUTO: 5.11 K/UL — SIGNIFICANT CHANGE UP (ref 1.8–7.4)
NEUTROPHILS # BLD AUTO: 5.39 K/UL — SIGNIFICANT CHANGE UP (ref 1.8–7.4)
NEUTROPHILS NFR BLD AUTO: 64.9 % — SIGNIFICANT CHANGE UP (ref 43–77)
NEUTROPHILS NFR BLD AUTO: 67.2 % — SIGNIFICANT CHANGE UP (ref 43–77)
NITRITE UR-MCNC: NEGATIVE — SIGNIFICANT CHANGE UP
NRBC # FLD: 0 K/UL — SIGNIFICANT CHANGE UP (ref 0–0)
NRBC # FLD: 0 K/UL — SIGNIFICANT CHANGE UP (ref 0–0)
NT-PROBNP SERPL-SCNC: 849.5 PG/ML — SIGNIFICANT CHANGE UP
PCO2 BLDV: 35 MMHG — LOW (ref 41–51)
PH BLDV: 7.38 PH — SIGNIFICANT CHANGE UP (ref 7.32–7.43)
PH UR: 6.5 — SIGNIFICANT CHANGE UP (ref 5–8)
PLATELET # BLD AUTO: 179 K/UL — SIGNIFICANT CHANGE UP (ref 150–400)
PLATELET # BLD AUTO: 187 K/UL — SIGNIFICANT CHANGE UP (ref 150–400)
PMV BLD: 11.9 FL — SIGNIFICANT CHANGE UP (ref 7–13)
PMV BLD: 12.6 FL — SIGNIFICANT CHANGE UP (ref 7–13)
PO2 BLDV: 47 MMHG — HIGH (ref 35–40)
POTASSIUM BLDV-SCNC: 3.2 MMOL/L — LOW (ref 3.4–4.5)
POTASSIUM SERPL-MCNC: 3.2 MMOL/L — LOW (ref 3.5–5.3)
POTASSIUM SERPL-MCNC: 3.3 MMOL/L — LOW (ref 3.5–5.3)
POTASSIUM SERPL-SCNC: 3.2 MMOL/L — LOW (ref 3.5–5.3)
POTASSIUM SERPL-SCNC: 3.3 MMOL/L — LOW (ref 3.5–5.3)
PROT SERPL-MCNC: 5.8 G/DL — LOW (ref 6–8.3)
PROT SERPL-MCNC: 5.8 G/DL — LOW (ref 6–8.3)
PROT UR-MCNC: 20 — SIGNIFICANT CHANGE UP
PROTHROM AB SERPL-ACNC: 10.8 SEC — SIGNIFICANT CHANGE UP (ref 10.6–13.6)
PROTHROM AB SERPL-ACNC: 10.8 SEC — SIGNIFICANT CHANGE UP (ref 10.6–13.6)
RBC # BLD: 3.13 M/UL — LOW (ref 3.8–5.2)
RBC # BLD: 3.19 M/UL — LOW (ref 3.8–5.2)
RBC # FLD: 13.6 % — SIGNIFICANT CHANGE UP (ref 10.3–14.5)
RBC # FLD: 14 % — SIGNIFICANT CHANGE UP (ref 10.3–14.5)
RBC CASTS # UR COMP ASSIST: >50 — HIGH (ref 0–?)
RH IG SCN BLD-IMP: POSITIVE — SIGNIFICANT CHANGE UP
SAO2 % BLDV: 81 % — SIGNIFICANT CHANGE UP (ref 60–85)
SARS-COV-2 RNA SPEC QL NAA+PROBE: SIGNIFICANT CHANGE UP
SODIUM SERPL-SCNC: 141 MMOL/L — SIGNIFICANT CHANGE UP (ref 135–145)
SODIUM SERPL-SCNC: 142 MMOL/L — SIGNIFICANT CHANGE UP (ref 135–145)
SP GR SPEC: 1.02 — SIGNIFICANT CHANGE UP (ref 1–1.04)
SQUAMOUS # UR AUTO: SIGNIFICANT CHANGE UP
T3 SERPL-MCNC: 117 NG/DL — SIGNIFICANT CHANGE UP (ref 80–200)
T4 AB SER-ACNC: 9.07 UG/DL — SIGNIFICANT CHANGE UP (ref 5.1–13)
TROPONIN T, HIGH SENSITIVITY: 7 NG/L — SIGNIFICANT CHANGE UP (ref ?–14)
TROPONIN T, HIGH SENSITIVITY: 8 NG/L — SIGNIFICANT CHANGE UP (ref ?–14)
TSH SERPL-MCNC: 8.85 UIU/ML — HIGH (ref 0.27–4.2)
URATE SERPL-MCNC: 6.2 MG/DL — SIGNIFICANT CHANGE UP (ref 2.5–7)
URATE SERPL-MCNC: 6.5 MG/DL — SIGNIFICANT CHANGE UP (ref 2.5–7)
UROBILINOGEN FLD QL: NORMAL — SIGNIFICANT CHANGE UP
WBC # BLD: 7.87 K/UL — SIGNIFICANT CHANGE UP (ref 3.8–10.5)
WBC # BLD: 8.02 K/UL — SIGNIFICANT CHANGE UP (ref 3.8–10.5)
WBC # FLD AUTO: 7.87 K/UL — SIGNIFICANT CHANGE UP (ref 3.8–10.5)
WBC # FLD AUTO: 8.02 K/UL — SIGNIFICANT CHANGE UP (ref 3.8–10.5)
WBC UR QL: >50 — HIGH (ref 0–?)

## 2020-08-12 PROCEDURE — 93306 TTE W/DOPPLER COMPLETE: CPT | Mod: 26

## 2020-08-12 PROCEDURE — 93010 ELECTROCARDIOGRAM REPORT: CPT

## 2020-08-12 PROCEDURE — 99223 1ST HOSP IP/OBS HIGH 75: CPT

## 2020-08-12 PROCEDURE — 71275 CT ANGIOGRAPHY CHEST: CPT | Mod: 26

## 2020-08-12 PROCEDURE — 99291 CRITICAL CARE FIRST HOUR: CPT

## 2020-08-12 PROCEDURE — 99231 SBSQ HOSP IP/OBS SF/LOW 25: CPT | Mod: 25

## 2020-08-12 PROCEDURE — 71045 X-RAY EXAM CHEST 1 VIEW: CPT | Mod: 26

## 2020-08-12 RX ORDER — MAGNESIUM SULFATE 500 MG/ML
2 VIAL (ML) INJECTION ONCE
Refills: 0 | Status: DISCONTINUED | OUTPATIENT
Start: 2020-08-12 | End: 2020-08-12

## 2020-08-12 RX ORDER — HYDRALAZINE HCL 50 MG
5 TABLET ORAL ONCE
Refills: 0 | Status: COMPLETED | OUTPATIENT
Start: 2020-08-12 | End: 2020-08-12

## 2020-08-12 RX ORDER — NIFEDIPINE 30 MG
30 TABLET, EXTENDED RELEASE 24 HR ORAL DAILY
Refills: 0 | Status: DISCONTINUED | OUTPATIENT
Start: 2020-08-12 | End: 2020-08-12

## 2020-08-12 RX ORDER — HEPARIN SODIUM 5000 [USP'U]/ML
5000 INJECTION INTRAVENOUS; SUBCUTANEOUS EVERY 12 HOURS
Refills: 0 | Status: DISCONTINUED | OUTPATIENT
Start: 2020-08-12 | End: 2020-08-17

## 2020-08-12 RX ORDER — POTASSIUM CHLORIDE 20 MEQ
40 PACKET (EA) ORAL ONCE
Refills: 0 | Status: COMPLETED | OUTPATIENT
Start: 2020-08-12 | End: 2020-08-12

## 2020-08-12 RX ORDER — FUROSEMIDE 40 MG
20 TABLET ORAL ONCE
Refills: 0 | Status: COMPLETED | OUTPATIENT
Start: 2020-08-12 | End: 2020-08-12

## 2020-08-12 RX ORDER — ACETAMINOPHEN 500 MG
650 TABLET ORAL EVERY 6 HOURS
Refills: 0 | Status: DISCONTINUED | OUTPATIENT
Start: 2020-08-12 | End: 2020-08-14

## 2020-08-12 RX ORDER — ACETAMINOPHEN 500 MG
975 TABLET ORAL ONCE
Refills: 0 | Status: COMPLETED | OUTPATIENT
Start: 2020-08-12 | End: 2020-08-12

## 2020-08-12 RX ORDER — MAGNESIUM SULFATE 500 MG/ML
4 VIAL (ML) INJECTION ONCE
Refills: 0 | Status: DISCONTINUED | OUTPATIENT
Start: 2020-08-12 | End: 2020-08-12

## 2020-08-12 RX ORDER — MORPHINE SULFATE 50 MG/1
4 CAPSULE, EXTENDED RELEASE ORAL ONCE
Refills: 0 | Status: DISCONTINUED | OUTPATIENT
Start: 2020-08-12 | End: 2020-08-12

## 2020-08-12 RX ORDER — LEVETIRACETAM 250 MG/1
500 TABLET, FILM COATED ORAL
Refills: 0 | Status: DISCONTINUED | OUTPATIENT
Start: 2020-08-12 | End: 2020-08-13

## 2020-08-12 RX ORDER — LEVETIRACETAM 250 MG/1
500 TABLET, FILM COATED ORAL
Refills: 0 | Status: DISCONTINUED | OUTPATIENT
Start: 2020-08-12 | End: 2020-08-12

## 2020-08-12 RX ORDER — FAMOTIDINE 10 MG/ML
20 INJECTION INTRAVENOUS ONCE
Refills: 0 | Status: COMPLETED | OUTPATIENT
Start: 2020-08-12 | End: 2020-08-12

## 2020-08-12 RX ORDER — HYDRALAZINE HCL 50 MG
25 TABLET ORAL EVERY 8 HOURS
Refills: 0 | Status: DISCONTINUED | OUTPATIENT
Start: 2020-08-12 | End: 2020-08-14

## 2020-08-12 RX ADMIN — MORPHINE SULFATE 4 MILLIGRAM(S): 50 CAPSULE, EXTENDED RELEASE ORAL at 09:17

## 2020-08-12 RX ADMIN — Medication 25 MILLIGRAM(S): at 23:04

## 2020-08-12 RX ADMIN — LEVETIRACETAM 500 MILLIGRAM(S): 250 TABLET, FILM COATED ORAL at 14:49

## 2020-08-12 RX ADMIN — Medication 20 MILLIGRAM(S): at 15:55

## 2020-08-12 RX ADMIN — Medication 20 MILLIGRAM(S): at 10:44

## 2020-08-12 RX ADMIN — Medication 650 MILLIGRAM(S): at 23:40

## 2020-08-12 RX ADMIN — Medication 40 MILLIEQUIVALENT(S): at 19:55

## 2020-08-12 RX ADMIN — Medication 975 MILLIGRAM(S): at 10:13

## 2020-08-12 RX ADMIN — FAMOTIDINE 20 MILLIGRAM(S): 10 INJECTION INTRAVENOUS at 13:39

## 2020-08-12 RX ADMIN — Medication 30 MILLIGRAM(S): at 13:42

## 2020-08-12 RX ADMIN — MORPHINE SULFATE 4 MILLIGRAM(S): 50 CAPSULE, EXTENDED RELEASE ORAL at 09:39

## 2020-08-12 RX ADMIN — Medication 5 MILLIGRAM(S): at 08:46

## 2020-08-12 RX ADMIN — Medication 650 MILLIGRAM(S): at 23:00

## 2020-08-12 RX ADMIN — HEPARIN SODIUM 5000 UNIT(S): 5000 INJECTION INTRAVENOUS; SUBCUTANEOUS at 18:57

## 2020-08-12 NOTE — PROGRESS NOTE ADULT - PROBLEM SELECTOR PLAN 1
Keppra initiated for seizure ppx  Procardia initated for BP control  Patient given lasix x1 at 11am, additional dose to be given  Strict I/O- does not need davison  Appreciate cardiology recommendations, continue on telemetry, TSH noted to be elevated (patient with unknown history, will send antibodies), Lyme antibodies   Continue to trend HELLP labs, mild transaminitis noted    Seen with Dr. Hannah Muro MD

## 2020-08-12 NOTE — CONSULT NOTE ADULT - ASSESSMENT
30 y/o P2, status post vaginal delivery on 8/7 here with chest pain shortness of breath and epigastric pain. found to have elevated BPs here as well as during delivery  status post Hydralazine 5mg IVP x1 in ER  Stat ECHO to rule out cardiomyopathy, if wnl start MgSO4 for seizure ppx  CT angio to rule out PE  HELLP labs  will admit to antepartum service    Andres Castillo M.D.

## 2020-08-12 NOTE — ED PROVIDER NOTE - ATTENDING CONTRIBUTION TO CARE
Dr Bloch, ATTENDING MD-  I performed a face to face bedside interview with patient regarding history of present illness, review of symptoms and past medical history. I completed an independent physical exam.  I have discussed patient's plan of care with PA.   Documentation as above in the note.  Patient tachypneic, mod distress, HEENT nml heart sounds mason, s1s2, no mgr. lungs clear, abd soft mild epigastric and suprapubic tenderness. extrem 4+ pitting edema. neuro alert and oriented2-12 intact. motor and sensory intact Concern for post partum preeclampsia, but in view of pleuritic CP and orthopnea , will r/o pe and post partum cardiomyopathy.OB called BP treated, will await echo before magnesium treatment..

## 2020-08-12 NOTE — H&P ADULT - ASSESSMENT
Admit to L+D     - seizure prophylaxis  - HTN regimen FLACO MOON  Patient is a 29y old now  who is PPD#4 status post  with intrapartum course complicated by gHTN who presented to the ED with preeclampsia with severe features. pt being admitted to L+D.    #Preeclampsia with Severe Features  - diagnosed by susatained severe range blood pressures, requiring IV Hydralazine 5mg x1 and elevated LFTs 2x upper limit of normal (AST75, ) with RUQ   - PE notable for RUQ and epigastric pain, 3+ bilateral pitting edema, and crackles in bilateral lung bases, denies any n/v, ha, visual changes,  Admit to L+D   - cardiac function wnl per echo, bilateral pleural effusion on imaging (PE ruled out)   - seizure prophylaxis with Keppra, 2/2 presence of pleural effusions   - HTN regimen initiated, Nifedipine     Pt discussed with Dr. Castillo.    Karine Diaz MD  OBGYN PGY2 FLACO MOON  Patient is a 29y old now  who is PPD#4 status post  with intrapartum course complicated by gHTN who presented to the ED with preeclampsia with severe features. pt being admitted to L+D.    #Preeclampsia with Severe Features  - diagnosed by susatained severe range blood pressures, requiring IV Hydralazine 5mg x1 and elevated LFTs 2x upper limit of normal (AST75, ) with RUQ   - PE notable for RUQ and epigastric pain, 3+ bilateral pitting edema, and crackles in bilateral lung bases, denies any n/v, ha, visual changes,  Admit to L+D   - cardiac function wnl per echo, bilateral pleural effusion and pulmonary htn on imaging (PE ruled out)   - seizure prophylaxis with Keppra, 2/2 presence of pleural effusions   - HTN regimen initiated, Nifedipine     Pt discussed with Dr. Castillo.    Karine Diaz MD  OBGYN PGY2

## 2020-08-12 NOTE — H&P ADULT - HISTORY OF PRESENT ILLNESS
*** INCOMPLETE ***      HPI:    FLACO MOON  Patient is a 29y old now  who is PPD#4 status post  who presented to the ED with a chief complaint of SOB and chest pain and was found to have severe range blood pressures in the ED requiring IV Hydralazine 5mg x1. She denies any n/v, ha, visual changes, but reports intense lower extremity swelling that began following her discharge from [facility]. Labor           REVIEW OF SYSTEMS:  CONSTITUTIONAL: No weakness, fevers or chills  EYES/ENT: No visual changes;  No vertigo or throat pain   NECK: No pain or stiffness  RESPIRATORY: No cough, wheezing, hemoptysis; No shortness of breath  CARDIOVASCULAR: No chest pain or palpitations  GASTROINTESTINAL: No abdominal or epigastric pain. No nausea, vomiting, or hematemesis; No diarrhea or constipation. No melena or hematochezia.  GENITOURINARY: No dysuria, frequency or hematuria  NEUROLOGICAL: No numbness or weakness  SKIN: No itching, burning, rashes, or lesions   All other review of systems is negative unless indicated above    MEDICATIONS  (STANDING):  NIFEdipine XL 30 milliGRAM(s) Oral daily    MEDICATIONS  (PRN):      Allergies    No Known Allergies    Intolerances        PAST MEDICAL & SURGICAL HISTORY:  No pertinent past medical history  No significant past surgical history      OB/GYN HISTORY:   Menarchy           Cycle Length              Days Flow                                       Last Menstrual Period                                         G    P                                       Last Pap smear:   [id number]                                            FAMILY HISTORY:      SOCIAL HISTORY:    Vital Signs Last 24 Hrs  T(C): 36.3 (12 Aug 2020 07:53), Max: 36.3 (12 Aug 2020 07:53)  T(F): 97.4 (12 Aug 2020 07:53), Max: 97.4 (12 Aug 2020 07:53)  HR: 43 (12 Aug 2020 13:06) (43 - 60)  BP: 148/73 (12 Aug 2020 13:06) (134/77 - 170/98)  BP(mean): --  RR: 24 (12 Aug 2020 13:06) (16 - 24)  SpO2: 100% (12 Aug 2020 13:06) (98% - 100%)    Last Menstrual Period      PHYSICAL EXAM:  Constitutional: NAD, awake and alert, well-developed  HEENT: PERR, EOMI, Normal Hearing, MMM  Neck: Soft and supple, No LAD, No JVD  Respiratory: Breath sounds are clear bilaterally, No wheezing, rales or rhonchi  Cardiovascular: S1 and S2, regular rate and rhythm, no Murmurs, gallops or rubs  Gastrointestinal: Bowel Sounds present, soft, nontender, nondistended, no guarding, no rebound  Extremities: No peripheral edema  Vascular: 2+ peripheral pulses  Neurological: A/O x 3, no focal deficits  Musculoskeletal: 5/5 strength b/l upper and lower extremities  Skin: No rashes    LABS:  Pregnancy Test:                        9.6    7.87  )-----------( 179      ( 12 Aug 2020 08:39 )             28.9     08-12    142  |  109<H>  |  9   ----------------------------<  78  3.3<L>   |  20<L>  |  0.58    Ca    8.6      12 Aug 2020 08:39  Mg     2.1         TPro  5.8<L>  /  Alb  3.1<L>  /  TBili  0.4  /  DBili  x   /  AST  75<H>  /  ALT  111<H>  /  AlkPhos  178<H>      I&O's Detail    12 Aug 2020 07:01  -  12 Aug 2020 13:13  --------------------------------------------------------  IN:  Total IN: 0 mL    OUT:    Voided: 75 mL  Total OUT: 75 mL    Total NET: -75 mL        PT/INR - ( 12 Aug 2020 08:39 )   PT: 10.8 SEC;   INR: 0.94          PTT - ( 12 Aug 2020 08:39 )  PTT:28.8 SEC  Urinalysis Basic - ( 12 Aug 2020 11:37 )    Color: COLORLESS / Appearance: CLEAR / S.022 / pH: 6.5  Gluc: NEGATIVE / Ketone: NEGATIVE  / Bili: NEGATIVE / Urobili: NORMAL   Blood: LARGE / Protein: 20 / Nitrite: NEGATIVE   Leuk Esterase: LARGE / RBC: >50 / WBC >50   Sq Epi: OCC / Non Sq Epi: x / Bacteria: NEGATIVE FLACO MOON  Patient is a 29y old now  who is PPD#4 status post  with intrapartum course complicated by gHTN who presented to the ED with a chief complaint of SOB and chest pain.  She was found to have sustained severe range bloods pressures in the ED requiring IV Hydralazine 5mg x1. She denies any n/v, ha, visual changes, but reports intense lower extremity swelling that began following her discharge from he hospital and although she endorses chest pain she points to the epigastric region when asked where the pain is located.            MEDICATIONS  (STANDING):  NIFEdipine XL 30 milliGRAM(s) Oral daily    MEDICATIONS  (PRN):  Allergies  No Known Allergies  Intolerances FLACO MOON  Patient is a 29y old now  who is PPD#4 status post  with intrapartum course complicated by gHTN who presented to the ED with a chief complaint of SOB and chest pain.  She was found to have sustained severe range bloods pressures in the ED requiring IV Hydralazine 5mg x1. She denies any n/v, ha, visual changes, but reports intense lower extremity swelling that began following her discharge from he hospital and although she endorses chest pain she points to the epigastric region when asked where the pain is located. Due to complaints on presentation ECHO and CTAngiogram were performed to rule out underlying cardiac pathology vs pulmonary embolism. Imaging findings as follows:    < from: Transthoracic Echocardiogram (20 @ 09:01) >  CONCLUSIONS:  1. Normal mitral valve. Mild mitral regurgitation.  2. Normal trileaflet aortic valve.  3. Normal left ventricular systolic function. No segmental  wall motion abnormalities.  4. The right ventricle is not well visualized; grossly  normal right ventricularsystolic function.  5. Estimated pulmonary artery systolic pressure equals 44  mm Hg, assuming right atrial pressure equals 10  mm Hg,  consistent with mild pulmonary hypertension.  6. Right pleural effusion.    < from: CT Angio Chest w/ IV Cont (20 @ 09:29) >  Patent central airways.  Evaluation of the parenchyma is limited due to respiratory motion artifact. Right lung base linear or subsegmental atelectasis. Mild bilateral  interlobular septal thickening suggestive of pulmonary edema. Mild to moderate right-sided and mild left-sided pleural effusions. No pneumothorax.    IMPRESSION:  No pulmonary embolism given the limitations of the study as above.  Mild to moderate right-sided and mild left-sided pleural effusions with interlobular septal thickening suggestive of pulmonary edema.      MEDICATIONS  (STANDING):  NIFEdipine XL 30 milliGRAM(s) Oral daily    MEDICATIONS  (PRN):  Allergies  No Known Allergies  Intolerances

## 2020-08-12 NOTE — PROGRESS NOTE ADULT - ASSESSMENT
Patient is 29y P2 PPD#5 status post uncomplicated  at [facility] admitted for severe preeclampsia complicated by pulmonary edema, bradycardia

## 2020-08-12 NOTE — ED ADULT TRIAGE NOTE - CHIEF COMPLAINT QUOTE
pt primarily brian speaking, requesting sister in law to translate.pt had vaginal birth 3 days ago . pt c.o having epigastric pain and SOB starting last night. pt noted to have bilateral leg swelling. denies n/v/d, dizziness. pmh pneumonia.     sister in law [facility] [phone] pt primarily brian speaking, requesting sister in law to translate.pt had vaginal birth 3 days ago . pt c.o having epigastric pain and SOB starting last night. pt noted to have bilateral leg swelling. denies n/v/d, dizziness. pmh pneumonia.    addendum- 830am pt noted to be Hypertensive, +3 pedal edema noted to b/l lower extremities, charge ASAEL Hernández callled pt sent to rm 20  sister in law [facility] [phone] pt primarily brian speaking, requesting sister in law to translate. pt had vaginal birth 3 days ago . pt c.o having epigastric pain and SOB starting last night. pt noted to have bilateral leg swelling. denies n/v/d, dizziness. pmh pneumonia.    addendum- 830am pt noted to be Hypertensive, +3 pedal edema noted to b/l lower extremities, spoke to Elvia VYAS from L&D pt to be evaluated by [facility], charge ASAEL Forbes called pt sent to rm 20  sister in law [facility] [phone]

## 2020-08-12 NOTE — CONSULT NOTE ADULT - SUBJECTIVE AND OBJECTIVE BOX
29 year old P2, status post vaginal delivery 3 days ago on 8/7. patient had elevated BPs in labor and postpartum (100s-160s/50s-90s w/ 1 outlier 193/97), but never treated for pre-eclampsia.  patient here with complaint of epigastric pain, chest pain and shortness of breath.   states PNC was otherwise uncomplicated  denies pertinent medical or surgical history  states had pneumonia as a child    Vital Signs Last 24 Hrs  T(C): 36.3 (12 Aug 2020 07:53), Max: 36.3 (12 Aug 2020 07:53)  HR: 54 (12 Aug 2020 09:06) (47 - 55)  BP: 145/76 (12 Aug 2020 09:06) (144/95 - 170/98)  RR: 20 (12 Aug 2020 09:06) (17 - 23)  SpO2: 98% (12 Aug 2020 09:06) (98% - 100%)    MEDICATIONS  (STANDING):  none      Allergies    No Known Allergies      PHYSICAL EXAM:  Constitutional: alert and oriented x 3  Respiratory: clear  Cardiovascular: bradycardic  Gastrointestinal: soft, non tender, + bowel sounds. No hepatosplenomegaly, no palpable masses  Uterus: normal size, non tender  pelvic: mild lochia

## 2020-08-12 NOTE — CHART NOTE - NSCHARTNOTEFT_GEN_A_CORE
R4 Event Note:     Patient is 29y P2 PPD#5 status post uncomplicated  at [facility] admitted for severe preeclampsia complicated by pulmonary edema.     Patient seen and evaluated at bedside after transfer from ED to [facility]. At bedside patient endorses new onset of LE edema, SOB, epigastric pain x1d. Patient notes continued SOB worse with reclining flat and improved with sitting up; since arrival to ED patient noted some improvement in her SOB after she received medication (lasix) in ED. Epigastric burning discomfort and LE edema have persisted. Denies HA, visual changes. Patient has no h/o HTN or cardiac problems in the past.  Denies fever/chills, nausea/vomiting, dizziness/lightheadedness. Lochia minimal since delivery.       EKG(ED): Sinus bradycardia   CTPA:  No pulmonary embolism given the limitations of the study as above.  Mild to moderate right-sided and mild left-sided pleural effusions with interlobular septal thickening suggestive of pulmonary edema.  CXR:  Hazy indistinct CP angles compatible with small pleural effusions.  Slightly prominent indistinct interstitial markings compatible with mild edema. No focal patchy airspace consolidations or pneumothorax.  Echo:   CONCLUSIONS: EF: 69%  1. Normal mitral valve. Mild mitral regurgitation.  2. Normal trileaflet aortic valve.  3. Normal left ventricular systolic function. No segmental  wall motion abnormalities.  4. The right ventricle is not well visualized; grossly  normal right ventricularsystolic function.  5. Estimated pulmonary artery systolic pressure equals 44  mm Hg, assuming right atrial pressure equals 10  mm Hg,  consistent with mild pulmonary hypertension.  6. Right pleural effusion.        Vital Signs Last 24 Hrs  T(C): 36.3 (12 Aug 2020 07:53), Max: 36.3 (12 Aug 2020 07:53)  T(F): 97.4 (12 Aug 2020 07:53), Max: 97.4 (12 Aug 2020 07:53)  HR: 43 (12 Aug 2020 13:06) (43 - 60)  BP: 148/73 (12 Aug 2020 13:06) (134/77 - 170/98)  BP(mean): --  RR: 24 (12 Aug 2020 13:06) (16 - 24)  SpO2: 100% (12 Aug 2020 13:06) (98% - 100%)    I&O's Detail    12 Aug 2020 07:01  -  12 Aug 2020 13:46  --------------------------------------------------------  IN:  Total IN: 0 mL    OUT:    Voided: 75 mL  Total OUT: 75 mL    Total NET: -75 mL        VS at bedside - O2 sat 100% on RA, HR 43, /73, RR 24    PE:  Gen: Appears comfortable  CV: bradycardia, s1 and s2 noted   Pulm: decreased breath sounds bilaterally.  Abd: Soft, appropriately tender, +epigastric tenderness to palpation  Ext: +2bilateral pitting edema of LE     Labs:                        9.6    7.87  )-----------( 179      ( 12 Aug 2020 08:39 )             28.9     0812    142  |  109<H>  |  9   ----------------------------<  78  3.3<L>   |  20<L>  |  0.58    Ca    8.6      12 Aug 2020 08:39  Mg     2.1     0812    TPro  5.8<L>  /  Alb  3.1<L>  /  TBili  0.4  /  DBili  x   /  AST  75<H>  /  ALT  111<H>  /  AlkPhos  178<H>  08-12    PT/INR - ( 12 Aug 2020 08:39 )   PT: 10.8 SEC;   INR: 0.94          PTT - ( 12 Aug 2020 08:39 )  PTT:28.8 SEC    Fibrinogen: Fibrinogen Assay: 618.0 mg/dL ( @ 08:39)      Lactate:     MEDICATIONS  (STANDING):  NIFEdipine XL 30 milliGRAM(s) Oral daily      Assessment/Plan:   Patient is 29y P2 PPD#5 status post uncomplicated  at [facility] admitted for severe preeclampsia complicated by pulmonary edema, r/o peripartum cardiomyopathy  #PostpartStart Keppra 500bid for seizure ppx for .     d/w Dr. Jason Adams PGY4  ------------------------------------------------------------------------------------------------------------- R4 Event Note:     Patient is 29y P2 PPD#5 status post uncomplicated  at [facility] admitted for severe preeclampsia complicated by pulmonary edema.     Patient seen and evaluated at bedside after transfer from ED to [facility]. At bedside patient endorses new onset of LE edema, SOB, epigastric pain x1d. Patient notes continued SOB worse with reclining flat and improved with sitting up; since arrival to ED patient noted some improvement in her SOB after she received medication (lasix) in ED. Epigastric burning discomfort and LE edema have persisted. Denies HA, visual changes. Patient has no h/o HTN or cardiac problems in the past.  Denies fever/chills, nausea/vomiting, dizziness/lightheadedness. Lochia minimal since delivery.       EKG(ED): Sinus bradycardia   CTPA:  No pulmonary embolism given the limitations of the study as above.  Mild to moderate right-sided and mild left-sided pleural effusions with interlobular septal thickening suggestive of pulmonary edema.  CXR:  Hazy indistinct CP angles compatible with small pleural effusions.  Slightly prominent indistinct interstitial markings compatible with mild edema. No focal patchy airspace consolidations or pneumothorax.  Echo:   CONCLUSIONS: EF: 69%  1. Normal mitral valve. Mild mitral regurgitation.  2. Normal trileaflet aortic valve.  3. Normal left ventricular systolic function. No segmental  wall motion abnormalities.  4. The right ventricle is not well visualized; grossly  normal right ventricularsystolic function.  5. Estimated pulmonary artery systolic pressure equals 44  mm Hg, assuming right atrial pressure equals 10  mm Hg,  consistent with mild pulmonary hypertension.  6. Right pleural effusion.        Vital Signs Last 24 Hrs  T(C): 36.3 (12 Aug 2020 07:53), Max: 36.3 (12 Aug 2020 07:53)  T(F): 97.4 (12 Aug 2020 07:53), Max: 97.4 (12 Aug 2020 07:53)  HR: 43 (12 Aug 2020 13:06) (43 - 60)  BP: 148/73 (12 Aug 2020 13:06) (134/77 - 170/98)  BP(mean): --  RR: 24 (12 Aug 2020 13:06) (16 - 24)  SpO2: 100% (12 Aug 2020 13:06) (98% - 100%)    I&O's Detail    12 Aug 2020 07:01  -  12 Aug 2020 13:46  --------------------------------------------------------  IN:  Total IN: 0 mL    OUT:    Voided: 75 mL  Total OUT: 75 mL    Total NET: -75 mL        VS at bedside - O2 sat 100% on RA, HR 43, /73, RR 24    PE:  Gen: Appears comfortable  CV: bradycardia, s1 and s2 noted   Pulm: decreased breath sounds bilaterally, tachypneic   Abd: Soft, appropriately tender, +epigastric tenderness to palpation  Ext: +2bilateral pitting edema of LE     Labs:                        9.6    7.87  )-----------( 179      ( 12 Aug 2020 08:39 )             28.9     08-12    142  |  109<H>  |  9   ----------------------------<  78  3.3<L>   |  20<L>  |  0.58    Ca    8.6      12 Aug 2020 08:39  Mg     2.1     08-12    TPro  5.8<L>  /  Alb  3.1<L>  /  TBili  0.4  /  DBili  x   /  AST  75<H>  /  ALT  111<H>  /  AlkPhos  178<H>  08-12    PT/INR - ( 12 Aug 2020 08:39 )   PT: 10.8 SEC;   INR: 0.94          PTT - ( 12 Aug 2020 08:39 )  PTT:28.8 SEC    Fibrinogen: Fibrinogen Assay: 618.0 mg/dL ( @ 08:39)      Lactate:     MEDICATIONS  (STANDING):  NIFEdipine XL 30 milliGRAM(s) Oral daily      Assessment/Plan:   Patient is 29y P2 PPD#5 status post uncomplicated  at [facility] admitted for severe preeclampsia complicated by pulmonary edema, r/o peripartum cardiomyopathy  #Postpartum severe preeclampsia   -Start Keppra 500bid for seizure ppx   -s/p hydral 5 IVP for severe BP  -start Procardia 30XL for BP control   -mild transaminitis AST/ALT   -trend HELLP labs q12h  -c/w close VS monitoring     #Epigastric pain, SOB, LE edema in setting of pulmonary edema/effusions and bradycardia   -2/2 sPEC  r/o cardiomyopathy (less likely given EF of 69%, normal LV systolic function, no structural abnormalities)  -s/p lasix 20 in ED, will give additional lasix 20 for pulmonary edema/effusions demonstrated on imaging   -[place] ordered for strict UOP monitoring   -O2 sat % on RA. Symptomatic improvement of SOB with sitting upright.    -Pepcid for epigastric discomfort.   -Appreciate cardiology recommendations. Will obtain lyme panel and repeat cardiac enzymes. Consider tele monitoring for bradycardia  -EKG demonstrated sinus bradycardia   -CTA negative for PE       d/w Dr. Muro [facility] fellow and Dr. Juanito Adams PGY4  -------------------------------------------------------------------------------------------------------------

## 2020-08-12 NOTE — CONSULT NOTE ADULT - ASSESSMENT
30 y/o female  s/p vaginal delivery 20, with no significant pmh who presented to the ED due to epigastric/chest pain, SOB, and bilateral lower extremity edema. CTA negative for PE. CXR demonstrated pleural effusions. P.E. remarkable for 2+ pitting edema in LE.    # Mid-sternal chest pain, SOB, and LE edema  - 2/2 to preeclampsia vs peripartum cardiomyopathy  - CTA negative for PE  - 1st time CP yesterday  - Received Lasix 20 mg IV Push 1x   - ECHO pending, bedside ECHO EF: 60s?      # Elevated BP  - no reported hx of gestational HTN  - BPs in 150s/90s  - received Hydralazine 5 mg IV Push 1x    Carmelina Williamson MD  Internal Medicine, PGY 1 28 y/o female  s/p vaginal delivery 20, with no significant pmh who presented to the ED due to epigastric/chest pain, SOB, and bilateral lower extremity edema. CTA negative for PE. CXR demonstrated pleural effusions. P.E. remarkable for 2+ pitting edema in LE.    # Mid-sternal chest pain, SOB, and LE edema  - more likely 2/2 to preeclampsia, less likely from peripartum cardiomyopathy as per Echo findings  - CTA negative for PE  - Received Lasix 20 mg IV Push 1x   - Echo: EF 69% normal LV systolic function, no structural abnormalities    # Elevated BP  - no reported hx of gestational HTN  - BPs in 150s/90s  - received Hydralazine 5 mg IV Push 1x    Carmelina Williamson MD  Internal Medicine, PGY 1 30 y/o female  s/p vaginal delivery 20, with no significant pmh who presented to the ED due to epigastric/chest pain, SOB, and bilateral lower extremity edema. CTA negative for PE. CXR demonstrated pleural effusions. P.E. remarkable for 2+ pitting edema in LE.    # Mid-sternal chest pain, SOB, and LE edema  - 2/2 to preeclampsia vs peripartum cardiomyopathy  - CTA negative for PE  - Received Lasix 20 mg IV Push 1x   - Echo: EF 69% normal LV systolic function, no structural abnormalities    # Elevated BP  - no reported hx of gestational HTN  - BPs in 150s/90s  - received Hydralazine 5 mg IV Push 1x    Carmelina Williamson MD  Internal Medicine, PGY 1 28 y/o female  s/p vaginal delivery 20, with no significant pmh who presented to the ED due to epigastric/chest pain, SOB, and bilateral lower extremity edema. CTA negative for PE. CXR demonstrated pleural effusions. P.E. remarkable for decreased breath sounds and bilateral bibasilar crackles2+ pitting edema in LE.    # Epigastric/chest pain, SOB, and LE edema  - most likely secondary to a volume overloaded state  - EKG demonstrated sinus bradycardia with T wave inversions. Repeat EKG.  - CTA negative for PE  - Echo: EF 69% normal LV systolic function, no structural abnormalities  - Received Lasix 20 mg IV Push 1x   - Start on Lasix 40 mg QD  - also found to have epigastric and RUQ tenderness on exam, no rebound or guarding, elevated lactate, in setting of elevated LFTs, concern for GI process    # Elevated BP  - no reported hx of gestational HTN  - BPs in 150s/90s  - received Hydralazine 5 mg IV Push 1x  - D/c nifedipine due to bradycardia  - Start on Hydralazine 25 TID    Carmelina Williamson MD  Internal Medicine, PGY 1 28 y/o female  s/p vaginal delivery 20, with no significant pmh who presented to the ED due to epigastric/chest pain, SOB, and bilateral lower extremity edema. CTA negative for PE. CXR demonstrated pleural effusions. P.E. remarkable for decreased breath sounds and bilateral bibasilar crackles2+ pitting edema in LE.    # Epigastric/chest pain, SOB, and LE edema  - most likely secondary to a volume overloaded state  - EKG demonstrated sinus bradycardia with T wave inversions. Repeat EKG.  - CTA negative for PE  - Echo: EF 69% normal LV systolic function, no structural abnormalities  - Received Lasix 20 mg IV Push 1x   - Start on Lasix 40 mg QD  - also found to have epigastric and RUQ tenderness on exam, without rebound or guarding, or elevated lactate, would consider further eval for GI process    # Elevated BP  - no reported hx of gestational HTN  - BPs in 150s/90s  - received Hydralazine 5 mg IV Push 1x  - D/c nifedipine due to bradycardia  - Start on Hydralazine 25 TID    Carmelina Williamson MD  Internal Medicine, PGY 1

## 2020-08-12 NOTE — H&P ADULT - NSHPREVIEWOFSYSTEMS_GEN_ALL_CORE
REVIEW OF SYSTEMS:  CONSTITUTIONAL: No weakness, fevers or chills  EYES/ENT: No visual changes;  No vertigo or throat pain   NECK: No pain or stiffness  RESPIRATORY: No cough, wheezing, hemoptysis; (+)shortness of breath  CARDIOVASCULAR: (+) chest pain or palpitations  GASTROINTESTINAL: (+) abdominal or epigastric pain. No nausea, vomiting  GENITOURINARY: appropriate lochia (light spotting)  NEUROLOGICAL: No headaches, LOC  All other review of systems is negative unless indicated above

## 2020-08-12 NOTE — ED PROVIDER NOTE - OBJECTIVE STATEMENT
30 y/o female no pmh c/o sob and abd pain x1 day. Pt is 3 days post partum, had . Pt states that everything went well with the pregnancy. Pt admits to developing epigastric/chest pain last night around 6PM. Pain is intermittent, worse with lying down, no aggravating factors. Pt also admits to sob, headache and worsening b/l LE edema. Pt denies palpitations, n/v/d, numbness, tingling, weakness, dizziness, syncope, fever or chills.

## 2020-08-12 NOTE — ED ADULT NURSE NOTE - OBJECTIVE STATEMENT
Pt presents to rm 20, A&Ox4, ambulatory w/o assistance, Lady RN: Pt presents to rm 20, A&Ox4, ambulatory w/o assistance, denies pmhx, here for evaluation of SOB, epigastric pain and mild headache since last night. Pt states she had a vaginal delivery x6days ago at [facility]. Pt appears uncomfortable at this time due to pain. B/l leg swelling obsereved at this time. As per ED providers, hold magnesium at this time, hydralazine given. IV established in right arm with a 18G, labs drawn and sent, call bell in reach, warm blanket provided, bed in lowest position, side rails up x2, MD evaluation in progress, pt on telemetry- sinus mason to 40's- Dr. Bloch aware. Report endorsed to primary RN Emily.

## 2020-08-12 NOTE — ED ADULT NURSE NOTE - CHIEF COMPLAINT QUOTE
pt primarily brian speaking, requesting sister in law to translate. pt had vaginal birth 3 days ago . pt c.o having epigastric pain and SOB starting last night. pt noted to have bilateral leg swelling. denies n/v/d, dizziness. pmh pneumonia.    addendum- 830am pt noted to be Hypertensive, +3 pedal edema noted to b/l lower extremities, spoke to Elvia VYAS from L&D pt to be evaluated by Ed first, charge ASAEL Forbes called pt sent to rm 20  sister in law [facility] [phone]

## 2020-08-12 NOTE — ED PROVIDER NOTE - PROGRESS NOTE DETAILS
CC note- Concern for post partum preeclampsia, but in view of pleuritic CP and orthopnea , will r/o pe and post partum cardiomyopathy.OB called BP treated, will await echo before magnesium treatment..Iv hydralazine for BP control initially due to HR 47.

## 2020-08-12 NOTE — H&P ADULT - NSHPLABSRESULTS_GEN_ALL_CORE
9.6    7.87  )-----------( 179      ( 12 Aug 2020 08:39 )             28.9       08-12    142  |  109<H>  |  9   ----------------------------<  78  3.3<L>   |  20<L>  |  0.58    Ca    8.6      12 Aug 2020 08:39  Mg     2.1         TPro  5.8<L>  /  Alb  3.1<L>  /  TBili  0.4  /  DBili  x   /  AST  75<H>  /  ALT  111<H>  /  AlkPhos  178<H>                Urinalysis Basic - ( 12 Aug 2020 11:37 )    Color: COLORLESS / Appearance: CLEAR / S.022 / pH: 6.5  Gluc: NEGATIVE / Ketone: NEGATIVE  / Bili: NEGATIVE / Urobili: NORMAL   Blood: LARGE / Protein: 20 / Nitrite: NEGATIVE   Leuk Esterase: LARGE / RBC: >50 / WBC >50   Sq Epi: OCC / Non Sq Epi: x / Bacteria: NEGATIVE        PT/INR - ( 12 Aug 2020 08:39 )   PT: 10.8 SEC;   INR: 0.94          PTT - ( 12 Aug 2020 08:39 )  PTT:28.8 SEC    Lactate Trend            CAPILLARY BLOOD GLUCOSE

## 2020-08-12 NOTE — H&P ADULT - NSHPPHYSICALEXAM_GEN_ALL_CORE
PHYSICAL EXAM:  Constitutional:  awake and alert, tired appearing  HEENT: PERR, EOMI, Normal Hearing  Neck: Soft and supple, No JVD  Respiratory: Breath sounds are clear bilaterally in upper lobes, No wheezing, crackles in bilateral lung bases  Cardiovascular: S1 and S2, regular rate and rhythm, no Murmurs, gallops or rubs  Gastrointestinal: Bowel Sounds present, soft, tender to palpation in RUQ and epigastric region, nondistended, no guarding, no rebound  Extremities: 3+ pitting bilateral lower extremity edema  Vascular: 2+ peripheral pulses  Neurological: A/O x 3, no focal deficits    Vital Signs Last 24 Hrs  T(C): 36.3 (12 Aug 2020 07:53), Max: 36.3 (12 Aug 2020 07:53)  T(F): 97.4 (12 Aug 2020 07:53), Max: 97.4 (12 Aug 2020 07:53)  HR: 43 (12 Aug 2020 13:06) (43 - 60)  BP: 148/73 (12 Aug 2020 13:06) (134/77 - 170/98)  BP(mean): --  RR: 24 (12 Aug 2020 13:06) (16 - 24)  SpO2: 100% (12 Aug 2020 13:06) (98% - 100%)

## 2020-08-12 NOTE — CONSULT NOTE ADULT - SUBJECTIVE AND OBJECTIVE BOX
INTERVAL EVENTS:    PAST MEDICAL & SURGICAL HISTORY:  No pertinent past medical history  No significant past surgical history      MEDICATIONS  (STANDING):  magnesium sulfate  IVPB 4 Gram(s) IV Intermittent Once    MEDICATIONS  (PRN):      Vital Signs Last 24 Hrs  T(C): 36.3 (12 Aug 2020 07:53), Max: 36.3 (12 Aug 2020 07:53)  T(F): 97.4 (12 Aug 2020 07:53), Max: 97.4 (12 Aug 2020 07:53)  HR: 50 (12 Aug 2020 10:54) (47 - 60)  BP: 157/93 (12 Aug 2020 10:54) (134/77 - 170/98)  BP(mean): --  RR: 18 (12 Aug 2020 10:54) (16 - 23)  SpO2: 88% (12 Aug 2020 10:54) (88% - 100%)     PHYSICAL EXAM:  GEN: Awake, alert. NAD.   HEENT: NCAT, PERRL, EOMI. Mucosa moist. No JVD.  RESP: CTA b/l  CV: RRR. Normal S1/S2. No m/r/g.  ABD: Soft. NT/ND. BS+  EXT: Warm. No edema, clubbing, or cyanosis.   NEURO: AAOx3. No focal deficits.     LABS:                        9.6    7.87  )-----------( 179      ( 12 Aug 2020 08:39 )             28.9     08-12    142  |  109<H>  |  9   ----------------------------<  78  3.3<L>   |  20<L>  |  0.58    Ca    8.6      12 Aug 2020 08:39  Mg     2.1     08-12    TPro  5.8<L>  /  Alb  3.1<L>  /  TBili  0.4  /  DBili  x   /  AST  75<H>  /  ALT  111<H>  /  AlkPhos  178<H>  08-12        PT/INR - ( 12 Aug 2020 08:39 )   PT: 10.8 SEC;   INR: 0.94          PTT - ( 12 Aug 2020 08:39 )  PTT:28.8 SEC    I&O's Summary    BNPSerum Pro-Brain Natriuretic Peptide: 849.5 pg/mL (08-12 @ 08:39)    RADIOLOGY & ADDITIONAL STUDIES:    TELEMETRY:    EKG: Patient is a 29y old  Female who presents with a chief complaint of       HPI:    28 y/o female  s/p vaginal delivery 20, with no significant pmh who presented to the ED due to epigastric/chest pain, SOB, and bilateral lower extremity edema. Last night, pt states she had mid-sternal pain, nonradiating, 10/10, constant, while she was sitting and worsening when she lay down to sleep. No nausea or vomiting at the time, but some diaphoresis. During her pregnancy, no reported history of chest pain. Accompanied with this pain, was SOB. It also started last night during her event of chest pain, but she notices that since her pregnancy she has exertional SOB when walking. Since 4 days ago, she has had lower extremity edema which she believes has been worsening.   At home, she sleeps with 2 pillows for comfort, not SOB and walks 3-4 blocks before SOB.    PAST MEDICAL HISTORY:  No pertinent past medical history    PAST SURGICAL HISTORY:  No significant past surgical history    FAMILY HISTORY:  No significant family hx.  No family hx of MI or sudden cardiac death.    Social:  Denies alcohol, tobacco, or drug use.    Allergies  No Known Allergies  	  MEDICATIONS:  magnesium sulfate  IVPB 4 Gram(s) IV Intermittent Once    PHYSICAL EXAM:  T(C): 36.3 (20 @ 07:53), Max: 36.3 (20 @ 07:53)  HR: 47 (20 @ 11:13) (47 - 60)  BP: 157/87 (20 @ 11:13) (134/77 - 170/98)  RR: 18 (20 @ 11:13) (16 - 23)  SpO2: 99% (20 @ 11:13) (98% - 100%)  Wt(kg): --  I&O's Summary    Height (cm): 157.48 ( @ 07:53)    Gen: NAD, AAOx3  Neck: no JVD  Cardiovascular: Normal S1 S2, No murmurs,    Respiratory: Lungs clear to auscultation	  Gastrointestinal:  Soft, Non-tender, + BS	  Ext: 2+ pitting edema.  Neuro: no focal deficits.  Vascular: Peripheral pulses palpable 2+ bilaterally    TELEMETRY: 	    ECG:  	  RADIOLOGY:   DIAGNOSTIC TESTING:  [ ] Echocardiogram:  [ ]  Catheterization:  [ ] Stress Test:    OTHER: 	    < from: Xray Chest 1 View- PORTABLE-Urgent (20 @ 10:26) >  Hazy indistinct CP angles compatible with small pleural effusions.    Slightly prominent indistinct interstitial markings compatible with mild edema.    < end of copied text >      LABS:	 	    < from: CT Angio Chest w/ IV Cont (20 @ 09:29) >  No pulmonary embolism given the limitations of the study as above.    Mild to moderate right-sided and mild left-sided pleural effusions with interlobular septal thickening suggestive of pulmonary edema.    < end of copied text >  CARDIAC MARKERS:                            9.6    7.87  )-----------( 179      ( 12 Aug 2020 08:39 )             28.9         142  |  109<H>  |  9   ----------------------------<  78  3.3<L>   |  20<L>  |  0.58    Ca    8.6      12 Aug 2020 08:39  Mg     2.1         TPro  5.8<L>  /  Alb  3.1<L>  /  TBili  0.4  /  DBili  x   /  AST  75<H>  /  ALT  111<H>  /  AlkPhos  178<H>      proBNP: Serum Pro-Brain Natriuretic Peptide: 849.5 pg/mL ( @ 08:39)    Lipid Profile:   HgA1c:   TSH: Patient is a 29y old  Female who presents with a chief complaint of       HPI:    28 y/o female  s/p vaginal delivery 20, with no significant pmh who presented to the ED due to epigastric/chest pain, SOB, and bilateral lower extremity edema. Last night, pt states she had mid-sternal pain, nonradiating, 10/10, constant, while she was sitting and worsening when she lay down to sleep. No nausea or vomiting at the time, but some diaphoresis. During her pregnancy, no reported history of chest pain. Accompanied with this pain, was SOB. It also started last night during her event of chest pain, but she notices that since her pregnancy she has exertional SOB when walking. Since 4 days ago, she has had lower extremity edema which she believes has been worsening.   At home, she sleeps with 2 pillows for comfort, not SOB and walks 3-4 blocks before SOB.    PAST MEDICAL HISTORY:  No pertinent past medical history    PAST SURGICAL HISTORY:  No significant past surgical history    FAMILY HISTORY:  No significant family hx.  No family hx of MI or sudden cardiac death.    Social:  Denies alcohol, tobacco, or drug use.    Allergies  No Known Allergies  	  MEDICATIONS:  magnesium sulfate  IVPB 4 Gram(s) IV Intermittent Once    PHYSICAL EXAM:  T(C): 36.3 (20 @ 07:53), Max: 36.3 (20 @ 07:53)  HR: 47 (20 @ 11:13) (47 - 60)  BP: 157/87 (20 @ 11:13) (134/77 - 170/98)  RR: 18 (20 @ 11:13) (16 - 23)  SpO2: 99% (20 @ 11:13) (98% - 100%)  Wt(kg): --  I&O's Summary    Height (cm): 157.48 ( @ 07:53)    Gen: NAD, AAOx3  Neck: no JVD  Cardiovascular: Normal S1 S2, No murmurs,    Respiratory: Lungs clear to auscultation	  Gastrointestinal:  Soft, Non-tender, + BS	  Ext: 2+ pitting edema.  Neuro: no focal deficits.  Vascular: Peripheral pulses palpable 2+ bilaterally    TELEMETRY: 	    ECG:  	  RADIOLOGY:   DIAGNOSTIC TESTING:  [ ] Echocardiogram:  [ ]  Catheterization:  [ ] Stress Test:    OTHER: 	    < from: Xray Chest 1 View- PORTABLE-Urgent (20 @ 10:26) >  Hazy indistinct CP angles compatible with small pleural effusions.    Slightly prominent indistinct interstitial markings compatible with mild edema.    < from: Transthoracic Echocardiogram (20 @ 09:01) >  < from: Transthoracic Echocardiogram (20 @ 09:01) >  Ejection Fraction (Teicholtz): 69 %    < end of copied text >    1. Normal mitral valve. Mild mitral regurgitation.  2. Normal trileaflet aortic valve.  3. Normal left ventricular systolic function. No segmental  wall motion abnormalities.  4. The right ventricle is not well visualized; grossly  normal right ventricularsystolic function.  5. Estimated pulmonary artery systolic pressure equals 44  mm Hg, assuming right atrial pressure equals 10  mm Hg,  consistent with mild pulmonary hypertension.  6. Right pleural effusion.    < end of copied text >  < end of copied text >      LABS:	 	    < from: CT Angio Chest w/ IV Cont (20 @ 09:29) >  No pulmonary embolism given the limitations of the study as above.    Mild to moderate right-sided and mild left-sided pleural effusions with interlobular septal thickening suggestive of pulmonary edema.    < end of copied text >  CARDIAC MARKERS:                            9.6    7.87  )-----------( 179      ( 12 Aug 2020 08:39 )             28.9         142  |  109<H>  |  9   ----------------------------<  78  3.3<L>   |  20<L>  |  0.58    Ca    8.6      12 Aug 2020 08:39  Mg     2.1         TPro  5.8<L>  /  Alb  3.1<L>  /  TBili  0.4  /  DBili  x   /  AST  75<H>  /  ALT  111<H>  /  AlkPhos  178<H>      proBNP: Serum Pro-Brain Natriuretic Peptide: 849.5 pg/mL ( @ 08:39)    Lipid Profile:   HgA1c:   TSH: Patient is a 29y old  Female who presents with a chief complaint of epigastric/chest pain, SOB and edema.      HPI:    28 y/o female  s/p vaginal delivery 20, with no significant pmh who presented to the ED due to epigastric/chest pain, SOB, and bilateral lower extremity edema. Last night, pt states she had mid-sternal pain, nonradiating, 10/10, constant, while she was sitting and worsening when she lay down to sleep. No nausea or vomiting at the time, but some diaphoresis. During her pregnancy, no reported history of chest pain. Accompanied with this pain, was SOB. It also started last night during her event of chest pain, but she notices that since her pregnancy she has exertional SOB when walking. Since 4 days ago, she has had lower extremity edema which she believes has been worsening.   At home, she sleeps with 2 pillows for comfort, not for SOB, and admits to getting SOB after walking 3-4 blocks.  Currently, has chest pain and SOB.    PAST MEDICAL HISTORY:  No pertinent past medical history    PAST SURGICAL HISTORY:  No significant past surgical history    FAMILY HISTORY:  No significant family hx.  No family hx of MI or sudden cardiac death.    Social:  Denies alcohol, tobacco, or drug use.    Allergies  No Known Allergies  	  MEDICATIONS:  magnesium sulfate  IVPB 4 Gram(s) IV Intermittent Once    PHYSICAL EXAM:  T(C): 36.3 (20 @ 07:53), Max: 36.3 (20 @ 07:53)  HR: 47 (20 @ 11:13) (47 - 60)  BP: 157/87 (20 @ 11:13) (134/77 - 170/98)  RR: 18 (20 @ 11:13) (16 - 23)  SpO2: 99% (20 @ 11:13) (98% - 100%)  Wt(kg): --  I&O's Summary    Height (cm): 157.48 ( @ 07:53)    Gen: NAD, AAOx3  Neck: no JVD  Cardiovascular: Normal S1 S2, No murmurs,    Respiratory: Lungs clear to auscultation	  Gastrointestinal:  Soft, Non-tender, + BS	  Ext: 2+ pitting edema.  Neuro: no focal deficits.  Vascular: Peripheral pulses palpable 2+ bilaterally    TELEMETRY: 	    ECG:  	  RADIOLOGY:   DIAGNOSTIC TESTING:  [ ] Echocardiogram:  [ ]  Catheterization:  [ ] Stress Test:    OTHER: 	    < from: Xray Chest 1 View- PORTABLE-Urgent (20 @ 10:26) >  Hazy indistinct CP angles compatible with small pleural effusions.    Slightly prominent indistinct interstitial markings compatible with mild edema.    < from: Transthoracic Echocardiogram (20 @ 09:01) >  < from: Transthoracic Echocardiogram (20 @ 09:01) >  Ejection Fraction (Teicholtz): 69 %    < end of copied text >    1. Normal mitral valve. Mild mitral regurgitation.  2. Normal trileaflet aortic valve.  3. Normal left ventricular systolic function. No segmental  wall motion abnormalities.  4. The right ventricle is not well visualized; grossly  normal right ventricularsystolic function.  5. Estimated pulmonary artery systolic pressure equals 44  mm Hg, assuming right atrial pressure equals 10  mm Hg,  consistent with mild pulmonary hypertension.  6. Right pleural effusion.    < end of copied text >  < end of copied text >      LABS:	 	    < from: CT Angio Chest w/ IV Cont (20 @ 09:29) >  No pulmonary embolism given the limitations of the study as above.    Mild to moderate right-sided and mild left-sided pleural effusions with interlobular septal thickening suggestive of pulmonary edema.    < end of copied text >  CARDIAC MARKERS:                            9.6    7.87  )-----------( 179      ( 12 Aug 2020 08:39 )             28.9         142  |  109<H>  |  9   ----------------------------<  78  3.3<L>   |  20<L>  |  0.58    Ca    8.6      12 Aug 2020 08:39  Mg     2.1         TPro  5.8<L>  /  Alb  3.1<L>  /  TBili  0.4  /  DBili  x   /  AST  75<H>  /  ALT  111<H>  /  AlkPhos  178<H>      proBNP: Serum Pro-Brain Natriuretic Peptide: 849.5 pg/mL ( @ 08:39)    Lipid Profile:   HgA1c:   TSH: Patient is a 29y old  Female who presents with a chief complaint of epigastric/chest pain, SOB and edema.      HPI:    30 y/o female  s/p vaginal delivery 20, with no significant pmh who presented to the ED due to epigastric/chest pain, SOB, and bilateral lower extremity edema. Last night, pt states she had mid-sternal pain, nonradiating, 10/10, constant, while she was sitting and worsening when she lay down to sleep. No nausea or vomiting at the time, but some diaphoresis. During her pregnancy, no reported history of chest pain. Accompanied with this pain, was SOB. It also started last night during her event of chest pain, but she notices that since her pregnancy she has exertional SOB when walking. Since 4 days ago, she has had lower extremity edema which she believes has been worsening. At home, she sleeps with 2 pillows for comfort, not for SOB, and admits to getting SOB after walking 3-4 blocks.    While in the ED, her BP elevated to a peak of 170/98 and was given Hydralazine 5 mg IV Push once. Also received Lasix 20 mg IV Push once for her bilateral lower extremity edema.    Currently, pt admits to mild chest pain and intermittent SOB.    PAST MEDICAL HISTORY:  No pertinent past medical history    PAST SURGICAL HISTORY:  No significant past surgical history    FAMILY HISTORY:  No significant family hx.  No family hx of MI or sudden cardiac death.    Social:  Denies alcohol, tobacco, or drug use.    Allergies  No Known Allergies  	  MEDICATIONS:  magnesium sulfate  IVPB 4 Gram(s) IV Intermittent Once    PHYSICAL EXAM:  T(C): 36.3 (20 @ 07:53), Max: 36.3 (20 @ 07:53)  HR: 47 (20 @ 11:13) (47 - 60)  BP: 157/87 (20 @ 11:13) (134/77 - 170/98)  RR: 18 (20 @ 11:13) (16 - 23)  SpO2: 99% (20 @ 11:13) (98% - 100%)  Wt(kg): --  I&O's Summary    Height (cm): 157.48 ( @ 07:53)    Gen: NAD, AAOx3  Neck: no JVD  Cardiovascular: Normal S1 S2, No murmurs,    Respiratory: Lungs clear to auscultation	  Gastrointestinal:  Soft, Non-tender, + BS	  Ext: 2+ pitting edema.  Neuro: no focal deficits.  Vascular: Peripheral pulses palpable 2+ bilaterally    TELEMETRY: 	    ECG:  	  RADIOLOGY:   DIAGNOSTIC TESTING:  [ ] Echocardiogram:  [ ]  Catheterization:  [ ] Stress Test:    OTHER: 	    < from: Xray Chest 1 View- PORTABLE-Urgent (20 @ 10:26) >  Hazy indistinct CP angles compatible with small pleural effusions.    Slightly prominent indistinct interstitial markings compatible with mild edema.    < from: Transthoracic Echocardiogram (20 @ 09:01) >  < from: Transthoracic Echocardiogram (20 @ 09:01) >  Ejection Fraction (Teicholtz): 69 %    < end of copied text >    1. Normal mitral valve. Mild mitral regurgitation.  2. Normal trileaflet aortic valve.  3. Normal left ventricular systolic function. No segmental  wall motion abnormalities.  4. The right ventricle is not well visualized; grossly  normal right ventricularsystolic function.  5. Estimated pulmonary artery systolic pressure equals 44  mm Hg, assuming right atrial pressure equals 10  mm Hg,  consistent with mild pulmonary hypertension.  6. Right pleural effusion.    < end of copied text >  < end of copied text >      LABS:	 	    < from: CT Angio Chest w/ IV Cont (20 @ 09:29) >  No pulmonary embolism given the limitations of the study as above.    Mild to moderate right-sided and mild left-sided pleural effusions with interlobular septal thickening suggestive of pulmonary edema.    < end of copied text >  CARDIAC MARKERS:                            9.6    7.87  )-----------( 179      ( 12 Aug 2020 08:39 )             28.9         142  |  109<H>  |  9   ----------------------------<  78  3.3<L>   |  20<L>  |  0.58    Ca    8.6      12 Aug 2020 08:39  Mg     2.1         TPro  5.8<L>  /  Alb  3.1<L>  /  TBili  0.4  /  DBili  x   /  AST  75<H>  /  ALT  111<H>  /  AlkPhos  178<H>      proBNP: Serum Pro-Brain Natriuretic Peptide: 849.5 pg/mL ( @ 08:39)    Lipid Profile:   HgA1c:   TSH: Patient is a 29y old  Female who presents with a chief complaint of epigastric/chest pain, SOB and edema.      HPI:    28 y/o female  s/p vaginal delivery 20, with no significant pmh who presented to the ED due to epigastric/chest pain, SOB, and bilateral lower extremity edema. Last night, pt states she had mid-sternal pain, nonradiating, 10/10, constant, while she was sitting and worsening when she lay down to sleep. No nausea or vomiting at the time, but some diaphoresis. During her pregnancy, no reported history of chest pain. Accompanied with this pain, was SOB. It also started last night during her event of chest pain, but she notices that since her pregnancy she has exertional SOB when walking. Since 4 days ago, she has had lower extremity edema which she believes has been worsening. At home, she sleeps with 2 pillows for comfort, not for SOB, and admits to getting SOB after walking 3-4 blocks.    While in the ED, her BP elevated to a peak of 170/98 and was given Hydralazine 5 mg IV Push once. Also received Lasix 20 mg IV Push once for her bilateral lower extremity edema.    Currently, pt admits to mild chest pain and intermittent SOB.    PAST MEDICAL HISTORY:  No pertinent past medical history    PAST SURGICAL HISTORY:  No significant past surgical history    FAMILY HISTORY:  No significant family hx.  No family hx of MI or sudden cardiac death.    Social:  Denies alcohol, tobacco, or drug use.    Allergies  No Known Allergies  	  MEDICATIONS:  magnesium sulfate  IVPB 4 Gram(s) IV Intermittent Once    PHYSICAL EXAM:  T(C): 36.3 (20 @ 07:53), Max: 36.3 (20 @ 07:53)  HR: 47 (20 @ 11:13) (47 - 60)  BP: 157/87 (20 @ 11:13) (134/77 - 170/98)  RR: 18 (20 @ 11:13) (16 - 23)  SpO2: 99% (20 @ 11:13) (98% - 100%)  Wt(kg): --  I&O's Summary    Height (cm): 157.48 ( @ 07:53)    Gen: NAD, AAOx3  Neck: no JVD  Cardiovascular: Normal S1 S2, No murmurs,    Respiratory: Decreased breath sounds and bilateral bibasilar crackles.	  Gastrointestinal:  Soft. Tender to palpation in epigastric region and RUQ.  Ext: 2+ pitting edema.  Neuro: no focal deficits.  Vascular: Peripheral pulses palpable 2+ bilaterally    TELEMETRY: 	    ECG:  	  RADIOLOGY:   DIAGNOSTIC TESTING:  [ ] Echocardiogram:  [ ]  Catheterization:  [ ] Stress Test:    OTHER: 	    < from: Xray Chest 1 View- PORTABLE-Urgent (20 @ 10:26) >  Hazy indistinct CP angles compatible with small pleural effusions.    Slightly prominent indistinct interstitial markings compatible with mild edema.    < from: Transthoracic Echocardiogram (20 @ 09:01) >  < from: Transthoracic Echocardiogram (20 @ 09:01) >  Ejection Fraction (Teicholtz): 69 %    < end of copied text >    1. Normal mitral valve. Mild mitral regurgitation.  2. Normal trileaflet aortic valve.  3. Normal left ventricular systolic function. No segmental  wall motion abnormalities.  4. The right ventricle is not well visualized; grossly  normal right ventricularsystolic function.  5. Estimated pulmonary artery systolic pressure equals 44  mm Hg, assuming right atrial pressure equals 10  mm Hg,  consistent with mild pulmonary hypertension.  6. Right pleural effusion.    < end of copied text >  < end of copied text >      LABS:	 	    < from: CT Angio Chest w/ IV Cont (20 @ 09:29) >  No pulmonary embolism given the limitations of the study as above.    Mild to moderate right-sided and mild left-sided pleural effusions with interlobular septal thickening suggestive of pulmonary edema.    < end of copied text >  CARDIAC MARKERS:                            9.6    7.87  )-----------( 179      ( 12 Aug 2020 08:39 )             28.9     08-    142  |  109<H>  |  9   ----------------------------<  78  3.3<L>   |  20<L>  |  0.58    Aspartate Aminotransferase (AST/SGOT): 71 u/L (20 @ 13:48)  Alanine Aminotransferase (ALT/SGPT): 117 u/L (20 @ 13:48)  Lactate: 371      Ca    8.6      12 Aug 2020 08:39  Mg     2.1         TPro  5.8<L>  /  Alb  3.1<L>  /  TBili  0.4  /  DBili  x   /  AST  75<H>  /  ALT  111<H>  /  AlkPhos  178<H>        proBNP: Serum Pro-Brain Natriuretic Peptide: 849.5 pg/mL ( @ 08:39)

## 2020-08-12 NOTE — ED PROVIDER NOTE - CLINICAL SUMMARY MEDICAL DECISION MAKING FREE TEXT BOX
28 y/o female 3 days post partum c/o abd pain, sob, headache and edema- hypertensive in triage concern for preeclampsia- will get labs, CTA chest, hydralazine, consult OB

## 2020-08-12 NOTE — PROGRESS NOTE ADULT - SUBJECTIVE AND OBJECTIVE BOX
MFM Fellow    Patient seen at bedside.   30yo  s/p  ( at [facility]) who presented to the ED due to new onset severe epigastric pain and SOB. Patient states she had mid-sternal pain, nonradiating, 10/10, constant, while she was sitting and worsening when she lay down to sleep. No nausea or vomiting.   States her prenatal course has been uncomplicated, and that her delivery was uncomplicated. Per patient, she notices that since her pregnancy she has exertional SOB when walking. Since 4 days ago, she has had lower extremity edema which she believes has been worsening. At home, she sleeps with 2 pillows for comfort, not for SOB, and admits to getting SOB after walking 3-4 blocks.    While in the ED, her BP elevated to a peak of 170/98 and was given Hydralazine 5 mg IV Push once. Also received Lasix 20 mg IV  Cardiology evaluated the patient at bedside. CTA negative for PE. TTE EF 69% normal LV systolic function, no structural abnormalities, but with mild pulmonary hypertension. Of note, patient also noted to be bradycardic while in the ED. Denies any prior history of bradycardia    Currently, pt states SOB has improved. Still has some epigastric pain. Feels sleepy however feels it is due to being unable to sleep last night.   12 point ROS negative except as outlined above    Vital Signs Last 24 Hrs  T(C): 36.2 (12 Aug 2020 15:49), Max: 36.8 (12 Aug 2020 13:06)  T(F): 97.1 (12 Aug 2020 15:49), Max: 98.2 (12 Aug 2020 13:06)  HR: 42 (12 Aug 2020 15:49) (40 - 60)  BP: 147/82 (12 Aug 2020 15:49) (134/77 - 170/98)  RR: 22 (12 Aug 2020 15:49) (16 - 24)  SpO2: 99% (12 Aug 2020 15:49) (98% - 100%)    PHYSICAL EXAM:    GA: NAD, A+0 x 3  Pulm: BL lower extremity crackles per PGY 3  Abd:  soft, mildly TTP in epigastric area, no fundal tenderness.   Extremities: no swelling or calf tenderness    LABS:                        9.5    8.02  )-----------( 187      ( 12 Aug 2020 13:48 )             29.7         141  |  107  |  9   ----------------------------<  72  3.2<L>   |  19<L>  |  0.52    Ca    8.4      12 Aug 2020 13:48  Mg     2.1         TPro  5.8<L>  /  Alb  2.9<L>  /  TBili  0.4  /  DBili  x   /  AST  71<H>  /  ALT  117<H>  /  AlkPhos  171<H>  12  PT/INR - ( 12 Aug 2020 13:48 )   PT: 10.8 SEC;   INR: 0.94     PTT - ( 12 Aug 2020 13:48 )  PTT:30.7 SEC    UOP: with only 75cc recorded  CXR: small pleural effusions;slightlyprominent indistinct interstitial markings compatible with mild edema.  EKG: sinus mason  Trop negative x2  TSH 8.8

## 2020-08-13 LAB
ALBUMIN SERPL ELPH-MCNC: 3.2 G/DL — LOW (ref 3.3–5)
ALP SERPL-CCNC: 172 U/L — HIGH (ref 40–120)
ALT FLD-CCNC: 192 U/L — HIGH (ref 4–33)
AMYLASE P1 CFR SERPL: 35 U/L — SIGNIFICANT CHANGE UP (ref 25–125)
ANION GAP SERPL CALC-SCNC: 15 MMO/L — HIGH (ref 7–14)
APTT BLD: 32.9 SEC — SIGNIFICANT CHANGE UP (ref 27–36.3)
AST SERPL-CCNC: 108 U/L — HIGH (ref 4–32)
B BURGDOR C6 AB SER-ACNC: NEGATIVE — SIGNIFICANT CHANGE UP
B BURGDOR IGG+IGM SER-ACNC: 0.07 INDEX — SIGNIFICANT CHANGE UP (ref 0.01–0.89)
BASOPHILS # BLD AUTO: 0.03 K/UL — SIGNIFICANT CHANGE UP (ref 0–0.2)
BASOPHILS NFR BLD AUTO: 0.3 % — SIGNIFICANT CHANGE UP (ref 0–2)
BILIRUB SERPL-MCNC: 0.5 MG/DL — SIGNIFICANT CHANGE UP (ref 0.2–1.2)
BUN SERPL-MCNC: 9 MG/DL — SIGNIFICANT CHANGE UP (ref 7–23)
CALCIUM SERPL-MCNC: 8.9 MG/DL — SIGNIFICANT CHANGE UP (ref 8.4–10.5)
CHLORIDE SERPL-SCNC: 105 MMOL/L — SIGNIFICANT CHANGE UP (ref 98–107)
CO2 SERPL-SCNC: 21 MMOL/L — LOW (ref 22–31)
CREAT SERPL-MCNC: 0.47 MG/DL — LOW (ref 0.5–1.3)
EOSINOPHIL # BLD AUTO: 0.17 K/UL — SIGNIFICANT CHANGE UP (ref 0–0.5)
EOSINOPHIL NFR BLD AUTO: 1.8 % — SIGNIFICANT CHANGE UP (ref 0–6)
FIBRINOGEN PPP-MCNC: 629 MG/DL — HIGH (ref 290–520)
GLUCOSE SERPL-MCNC: 77 MG/DL — SIGNIFICANT CHANGE UP (ref 70–99)
HCT VFR BLD CALC: 33.3 % — LOW (ref 34.5–45)
HGB BLD-MCNC: 11.3 G/DL — LOW (ref 11.5–15.5)
IMM GRANULOCYTES NFR BLD AUTO: 0.5 % — SIGNIFICANT CHANGE UP (ref 0–1.5)
INR BLD: 0.91 — SIGNIFICANT CHANGE UP (ref 0.88–1.16)
LDH SERPL L TO P-CCNC: 408 U/L — HIGH (ref 135–225)
LIDOCAIN IGE QN: 15.2 U/L — SIGNIFICANT CHANGE UP (ref 7–60)
LYMPHOCYTES # BLD AUTO: 1.39 K/UL — SIGNIFICANT CHANGE UP (ref 1–3.3)
LYMPHOCYTES # BLD AUTO: 14.5 % — SIGNIFICANT CHANGE UP (ref 13–44)
MCHC RBC-ENTMCNC: 30.6 PG — SIGNIFICANT CHANGE UP (ref 27–34)
MCHC RBC-ENTMCNC: 33.9 % — SIGNIFICANT CHANGE UP (ref 32–36)
MCV RBC AUTO: 90.2 FL — SIGNIFICANT CHANGE UP (ref 80–100)
MONOCYTES # BLD AUTO: 0.44 K/UL — SIGNIFICANT CHANGE UP (ref 0–0.9)
MONOCYTES NFR BLD AUTO: 4.6 % — SIGNIFICANT CHANGE UP (ref 2–14)
NEUTROPHILS # BLD AUTO: 7.53 K/UL — HIGH (ref 1.8–7.4)
NEUTROPHILS NFR BLD AUTO: 78.3 % — HIGH (ref 43–77)
NRBC # FLD: 0 K/UL — SIGNIFICANT CHANGE UP (ref 0–0)
PLATELET # BLD AUTO: 224 K/UL — SIGNIFICANT CHANGE UP (ref 150–400)
PMV BLD: 11.9 FL — SIGNIFICANT CHANGE UP (ref 7–13)
POTASSIUM SERPL-MCNC: 3.5 MMOL/L — SIGNIFICANT CHANGE UP (ref 3.5–5.3)
POTASSIUM SERPL-SCNC: 3.5 MMOL/L — SIGNIFICANT CHANGE UP (ref 3.5–5.3)
PROT SERPL-MCNC: 6.3 G/DL — SIGNIFICANT CHANGE UP (ref 6–8.3)
PROTHROM AB SERPL-ACNC: 10.5 SEC — LOW (ref 10.6–13.6)
RBC # BLD: 3.69 M/UL — LOW (ref 3.8–5.2)
RBC # FLD: 13.5 % — SIGNIFICANT CHANGE UP (ref 10.3–14.5)
SARS-COV-2 IGG SERPL QL IA: NEGATIVE — SIGNIFICANT CHANGE UP
SARS-COV-2 IGM SERPL IA-ACNC: 0.08 INDEX — SIGNIFICANT CHANGE UP
SODIUM SERPL-SCNC: 141 MMOL/L — SIGNIFICANT CHANGE UP (ref 135–145)
T4 FREE SERPL-MCNC: 0.91 NG/DL — SIGNIFICANT CHANGE UP (ref 0.9–1.8)
THYROPEROXIDASE AB SERPL-ACNC: <10 IU/ML — SIGNIFICANT CHANGE UP
URATE SERPL-MCNC: 7.3 MG/DL — HIGH (ref 2.5–7)
WBC # BLD: 9.61 K/UL — SIGNIFICANT CHANGE UP (ref 3.8–10.5)
WBC # FLD AUTO: 9.61 K/UL — SIGNIFICANT CHANGE UP (ref 3.8–10.5)

## 2020-08-13 PROCEDURE — 99232 SBSQ HOSP IP/OBS MODERATE 35: CPT | Mod: GC

## 2020-08-13 PROCEDURE — 76705 ECHO EXAM OF ABDOMEN: CPT | Mod: 26

## 2020-08-13 PROCEDURE — 99234 HOSP IP/OBS SM DT SF/LOW 45: CPT

## 2020-08-13 RX ORDER — FAMOTIDINE 10 MG/ML
20 INJECTION INTRAVENOUS DAILY
Refills: 0 | Status: DISCONTINUED | OUTPATIENT
Start: 2020-08-13 | End: 2020-08-14

## 2020-08-13 RX ORDER — METOCLOPRAMIDE HCL 10 MG
10 TABLET ORAL ONCE
Refills: 0 | Status: COMPLETED | OUTPATIENT
Start: 2020-08-13 | End: 2020-08-13

## 2020-08-13 RX ADMIN — Medication 25 MILLIGRAM(S): at 14:17

## 2020-08-13 RX ADMIN — HEPARIN SODIUM 5000 UNIT(S): 5000 INJECTION INTRAVENOUS; SUBCUTANEOUS at 17:38

## 2020-08-13 RX ADMIN — Medication 650 MILLIGRAM(S): at 07:15

## 2020-08-13 RX ADMIN — Medication 25 MILLIGRAM(S): at 21:38

## 2020-08-13 RX ADMIN — HEPARIN SODIUM 5000 UNIT(S): 5000 INJECTION INTRAVENOUS; SUBCUTANEOUS at 06:19

## 2020-08-13 RX ADMIN — LEVETIRACETAM 500 MILLIGRAM(S): 250 TABLET, FILM COATED ORAL at 06:19

## 2020-08-13 RX ADMIN — Medication 10 MILLIGRAM(S): at 09:41

## 2020-08-13 RX ADMIN — FAMOTIDINE 20 MILLIGRAM(S): 10 INJECTION INTRAVENOUS at 14:19

## 2020-08-13 RX ADMIN — Medication 650 MILLIGRAM(S): at 06:19

## 2020-08-13 RX ADMIN — Medication 25 MILLIGRAM(S): at 06:19

## 2020-08-13 NOTE — PROGRESS NOTE ADULT - ASSESSMENT
28 y/o female  s/p vaginal delivery 20, with no significant pmh who presented to the ED due to epigastric/chest pain, SOB, and bilateral lower extremity edema. CTA negative for PE. CXR demonstrated pleural effusions. P.E. remarkable for decreased breath sounds and bilateral bibasilar crackles2+ pitting edema in LE.    # Epigastric/chest pain, SOB, and LE edema  - most likely secondary to a volume overloaded state  - EKG demonstrated sinus bradycardia with T wave inversions. Repeat EKG.  - CTA negative for PE  - Echo: EF 69% normal LV systolic function, no structural abnormalities  - Received Lasix 20 mg IV Push 1x   - Start on Lasix 40 mg QD  - also found to have epigastric and RUQ tenderness on exam, without rebound or guarding, or elevated lactate, would consider further eval for GI process    # Elevated BP  - no reported hx of gestational HTN  - BPs in 150s/90s  - received Hydralazine 5 mg IV Push 1x  - D/c nifedipine due to bradycardia  - Start on Hydralazine 25 TID    Carmelina Williamson MD  Internal Medicine, PGY 1 28 y/o female  s/p vaginal delivery 20, with no significant pmh who presented to the ED due to epigastric/chest pain, SOB, and bilateral lower extremity edema. CTA negative for PE. CXR demonstrated pleural effusions. P.E. remarkable for decreased breath sounds and bilateral bibasilar crackles2+ pitting edema in LE.    # Epigastric/chest pain, SOB, and LE edema  - pt does not currently appear grossly volume overloaded, although pt now s/p multiple doses of lasix and states she is feeling better.   - EKG demonstrated sinus bradycardia with T wave inversions. Repeat EKG.  - CTA negative for PE  - Echo: EF 69% normal LV systolic function, no structural abnormalities  - Received Lasix 20 mg IV Push 1x   - Can start on Lasix 20 mg QD while here, daily assessments of volume status and symptoms required.   - also found to have epigastric and RUQ tenderness on exam, without rebound or guarding, or elevated lactate, further w/u per primary team.     # Elevated BP  - no reported hx of gestational HTN  - BPs in 150s/90s  - received Hydralazine 5 mg IV Push 1x  - D/c nifedipine due to bradycardia  - Start on Hydralazine 25 TID    Carmelina Williamson MD  Internal Medicine, PGY 1 28 y/o female  s/p vaginal delivery 20, with no significant pmh who presented to the ED due to epigastric/chest pain, SOB, and bilateral lower extremity edema. CTA negative for PE. CXR demonstrated pleural effusions. P.E. remarkable for improvement in her breath sounds with mild bilateral bibasilar crackles still present on lung exam and LE with mild edema. Patient is currently less symptomatic.    # Epigastric/chest pain, SOB, and LE edema  - pt does not currently appear grossly volume overloaded, although pt now s/p multiple doses of lasix and states she is feeling better.   - EKG demonstrated sinus bradycardia with T wave inversions.  - CTA negative for PE  - Echo: EF 69% normal LV systolic function, no structural abnormalities  - currently less symptomatic  - C/w Lasix 20 mg QD while here, daily assessments of volume status and symptoms required.   - also found to have epigastric and RUQ tenderness on exam, without rebound or guarding, or elevated lactate, further w/u per primary team.     # Elevated BP  - no reported hx of gestational HTN  - BP improved   - Not on nifedipine given her bradycardia, monitor on tele  - C/w Hydralazine 25 TID    Carmelina Williamson MD  Internal Medicine, PGY 1 30 y/o female  s/p vaginal delivery 20, with no significant pmh who presented to the ED due to epigastric/chest pain, SOB, and bilateral lower extremity edema. CTA negative for PE. CXR demonstrated pleural effusions. P.E. remarkable for improvement in her breath sounds with mild bilateral bibasilar crackles still present on lung exam and LE with mild edema. Patient is currently less symptomatic.    # Epigastric/chest pain, SOB, and LE edema  - pt does not currently appear grossly volume overloaded, although pt now s/p multiple doses of lasix and states she is feeling better.   - EKG demonstrated sinus bradycardia with T wave inversions.  - CTA negative for PE  - Echo: EF 69% normal LV systolic function, no structural abnormalities  - currently less symptomatic  - C/w Lasix 20 mg QD while here, daily assessments of volume status and symptoms required.   - also found to have epigastric and RUQ tenderness on exam, without rebound or guarding, or elevated lactate, further w/u per primary team.     # Elevated BP  - no reported hx of gestational HTN  - BP improved   - AS BP has improved, pt is less bradycardic. Continue to monitor tele  - C/w Hydralazine 25 TID    Carmelina Williamson MD  Internal Medicine, PGY 1 28 y/o female  s/p vaginal delivery 20, with no significant pmh who presented to the ED due to epigastric/chest pain, SOB, and bilateral lower extremity edema. CTA negative for PE. CXR demonstrated pleural effusions. P.E. remarkable for improvement in her breath sounds with mild bilateral bibasilar crackles still present on lung exam and LE with mild edema. Patient is currently less symptomatic.    # Epigastric/chest pain, SOB, and LE edema  - pt does not currently appear grossly volume overloaded, although pt now s/p multiple doses of lasix and states she is feeling better.   - EKG demonstrated sinus bradycardia with T wave inversions.  - CTA negative for PE  - Echo: EF 69% normal LV systolic function, no structural abnormalities  - currently less symptomatic  - C/w Lasix 20 mg QD while here, daily assessments of volume status and symptoms required.   - also found to have epigastric and RUQ tenderness on exam, without rebound or guarding, or elevated lactate, further w/u per primary team.     # Elevated BP  - no reported hx of gestational HTN  - BP improved   - As BP has improved, pt is less bradycardic. However due to events of sinus mason w/ 48 bpm on tele., continue to monitor tele.  - C/w Hydralazine 25 TID    Carmelina Williamson MD  Internal Medicine, PGY 1

## 2020-08-13 NOTE — PROGRESS NOTE ADULT - SUBJECTIVE AND OBJECTIVE BOX
INTERVAL EVENTS:    PAST MEDICAL & SURGICAL HISTORY:  No pertinent past medical history  No significant past surgical history      MEDICATIONS  (STANDING):  heparin   Injectable 5000 Unit(s) SubCutaneous every 12 hours  hydrALAZINE 25 milliGRAM(s) Oral every 8 hours  levETIRAcetam 500 milliGRAM(s) Oral two times a day    MEDICATIONS  (PRN):  acetaminophen   Tablet .. 650 milliGRAM(s) Oral every 6 hours PRN Mild Pain (1 - 3), Moderate Pain (4 - 6)      Vital Signs Last 24 Hrs  T(C): 36.6 (13 Aug 2020 10:00), Max: 37.6 (12 Aug 2020 18:01)  T(F): 97.9 (13 Aug 2020 10:00), Max: 99.7 (12 Aug 2020 18:01)  HR: 64 (13 Aug 2020 10:00) (40 - 73)  BP: 122/72 (13 Aug 2020 10:00) (122/50 - 159/94)  BP(mean): --  RR: 20 (13 Aug 2020 10:00) (16 - 24)  SpO2: 99% (13 Aug 2020 10:00) (97% - 100%)     PHYSICAL EXAM:  GEN: Awake, alert. NAD.   HEENT: NCAT, PERRL, EOMI. Mucosa moist. No JVD.  RESP: CTA b/l  CV: RRR. Normal S1/S2. No m/r/g.  ABD: Soft. NT/ND. BS+  EXT: Warm. No edema, clubbing, or cyanosis.   NEURO: AAOx3. No focal deficits.     LABS:                        11.3   9.61  )-----------( 224      ( 13 Aug 2020 09:07 )             33.3     08-13    141  |  105  |  9   ----------------------------<  77  3.5   |  21<L>  |  0.47<L>    Ca    8.9      13 Aug 2020 09:07  Mg     2.1     08-12    TPro  6.3  /  Alb  3.2<L>  /  TBili  0.5  /  DBili  x   /  AST  108<H>  /  ALT  192<H>  /  AlkPhos  172<H>  08-13    CARDIAC MARKERS ( 12 Aug 2020 13:48 )  x     / x     / 35 u/L / 1.18 ng/mL / x          PT/INR - ( 13 Aug 2020 09:07 )   PT: 10.5 SEC;   INR: 0.91          PTT - ( 13 Aug 2020 09:07 )  PTT:32.9 SEC  Urinalysis Basic - ( 12 Aug 2020 11:37 )    Color: COLORLESS / Appearance: CLEAR / S.022 / pH: 6.5  Gluc: NEGATIVE / Ketone: NEGATIVE  / Bili: NEGATIVE / Urobili: NORMAL   Blood: LARGE / Protein: 20 / Nitrite: NEGATIVE   Leuk Esterase: LARGE / RBC: >50 / WBC >50   Sq Epi: OCC / Non Sq Epi: x / Bacteria: NEGATIVE      I&O's Summary    12 Aug 2020 07:01  -  13 Aug 2020 07:00  --------------------------------------------------------  IN: 0 mL / OUT: 3675 mL / NET: -3675 mL INTERVAL EVENTS:    Patient seen and evaluated at the beside. Resting comfortably, denies any chest or epigastric pain or SOB.    PAST MEDICAL & SURGICAL HISTORY:  No pertinent past medical history  No significant past surgical history      MEDICATIONS  (STANDING):  heparin   Injectable 5000 Unit(s) SubCutaneous every 12 hours  hydrALAZINE 25 milliGRAM(s) Oral every 8 hours  levETIRAcetam 500 milliGRAM(s) Oral two times a day    MEDICATIONS  (PRN):  acetaminophen   Tablet .. 650 milliGRAM(s) Oral every 6 hours PRN Mild Pain (1 - 3), Moderate Pain (4 - 6)      Vital Signs Last 24 Hrs  T(C): 36.6 (13 Aug 2020 10:00), Max: 37.6 (12 Aug 2020 18:01)  T(F): 97.9 (13 Aug 2020 10:00), Max: 99.7 (12 Aug 2020 18:01)  HR: 64 (13 Aug 2020 10:00) (40 - 73)  BP: 122/72 (13 Aug 2020 10:00) (122/50 - 159/94)  BP(mean): --  RR: 20 (13 Aug 2020 10:00) (16 - 24)  SpO2: 99% (13 Aug 2020 10:00) (97% - 100%)     PHYSICAL EXAM:  GEN: Awake, alert. NAD.   HEENT: NCAT, PERRL, EOMI. Mucosa moist. No JVD.  RESP: CTA b/l. Mild b/l bibasilar crackles.  CV: RRR. Normal S1/S2.   ABD: Soft. NT/ND. BS+  EXT: Warm. Mild edema in LE. No clubbing, or cyanosis.   NEURO: AAOx3. No focal deficits.     LABS:                        11.3   9.61  )-----------( 224      ( 13 Aug 2020 09:07 )             33.3     08-13    141  |  105  |  9   ----------------------------<  77  3.5   |  21<L>  |  0.47<L>    Ca    8.9      13 Aug 2020 09:07  Mg     2.1     08-12    TPro  6.3  /  Alb  3.2<L>  /  TBili  0.5  /  DBili  x   /  AST  108<H>  /  ALT  192<H>  /  AlkPhos  172<H>  08-13    CARDIAC MARKERS ( 12 Aug 2020 13:48 )  x     / x     / 35 u/L / 1.18 ng/mL / x          PT/INR - ( 13 Aug 2020 09:07 )   PT: 10.5 SEC;   INR: 0.91          PTT - ( 13 Aug 2020 09:07 )  PTT:32.9 SEC  Urinalysis Basic - ( 12 Aug 2020 11:37 )    Color: COLORLESS / Appearance: CLEAR / S.022 / pH: 6.5  Gluc: NEGATIVE / Ketone: NEGATIVE  / Bili: NEGATIVE / Urobili: NORMAL   Blood: LARGE / Protein: 20 / Nitrite: NEGATIVE   Leuk Esterase: LARGE / RBC: >50 / WBC >50   Sq Epi: OCC / Non Sq Epi: x / Bacteria: NEGATIVE      I&O's Summary    12 Aug 2020 07:01  -  13 Aug 2020 07:00  --------------------------------------------------------  IN: 0 mL / OUT: 3675 mL / NET: -3675 mL INTERVAL EVENTS:    Patient seen and evaluated at the beside. Resting comfortably, denies any chest or epigastric pain or SOB.    PAST MEDICAL & SURGICAL HISTORY:  No pertinent past medical history  No significant past surgical history      MEDICATIONS  (STANDING):  heparin   Injectable 5000 Unit(s) SubCutaneous every 12 hours  hydrALAZINE 25 milliGRAM(s) Oral every 8 hours  levETIRAcetam 500 milliGRAM(s) Oral two times a day    MEDICATIONS  (PRN):  acetaminophen   Tablet .. 650 milliGRAM(s) Oral every 6 hours PRN Mild Pain (1 - 3), Moderate Pain (4 - 6)      Vital Signs Last 24 Hrs  T(C): 36.6 (13 Aug 2020 10:00), Max: 37.6 (12 Aug 2020 18:01)  T(F): 97.9 (13 Aug 2020 10:00), Max: 99.7 (12 Aug 2020 18:01)  HR: 64 (13 Aug 2020 10:00) (40 - 73)  BP: 122/72 (13 Aug 2020 10:00) (122/50 - 159/94)  BP(mean): --  RR: 20 (13 Aug 2020 10:00) (16 - 24)  SpO2: 99% (13 Aug 2020 10:00) (97% - 100%)     PHYSICAL EXAM:  GEN: Awake, alert. NAD.   HEENT: NCAT, PERRL, EOMI. Mucosa moist. No JVD.  RESP: CTA b/l. Mild b/l bibasilar crackles.  CV: RRR. Normal S1/S2.   ABD: Soft. NT/ND. BS+  EXT: Warm. Mild edema in LE. No clubbing, or cyanosis.   NEURO: AAOx3. No focal deficits.     LABS:                        11.3   9.61  )-----------( 224      ( 13 Aug 2020 09:07 )             33.3     08-13    141  |  105  |  9   ----------------------------<  77  3.5   |  21<L>  |  0.47<L>    Ca    8.9      13 Aug 2020 09:07  Mg     2.1     08-12    TPro  6.3  /  Alb  3.2<L>  /  TBili  0.5  /  DBili  x   /  AST  108<H>  /  ALT  192<H>  /  AlkPhos  172<H>  08-13    CARDIAC MARKERS ( 12 Aug 2020 13:48 )  x     / x     / 35 u/L / 1.18 ng/mL / x          PT/INR - ( 13 Aug 2020 09:07 )   PT: 10.5 SEC;   INR: 0.91          PTT - ( 13 Aug 2020 09:07 )  PTT:32.9 SEC  Urinalysis Basic - ( 12 Aug 2020 11:37 )    Color: COLORLESS / Appearance: CLEAR / S.022 / pH: 6.5  Gluc: NEGATIVE / Ketone: NEGATIVE  / Bili: NEGATIVE / Urobili: NORMAL   Blood: LARGE / Protein: 20 / Nitrite: NEGATIVE   Leuk Esterase: LARGE / RBC: >50 / WBC >50   Sq Epi: OCC / Non Sq Epi: x / Bacteria: NEGATIVE      I&O's Summary    12 Aug 2020 07:01  -  13 Aug 2020 07:00  --------------------------------------------------------  IN: 0 mL / OUT: 3675 mL / NET: -3675 mL      Telemetry:    Sinus bradycardia in high 40s and NSR>

## 2020-08-13 NOTE — PROGRESS NOTE ADULT - ASSESSMENT
A/P: 30y/o  PPD#6 status post , intrapartum course c/b gHTN, presented to the ED with severe range BPs, meeting criteria for sPEC. Hydral 5mg IVP administered in the ED. Patient found to have pulmonary edema, and was started on Keppra. She also had bradycardia on presentation, Cardiology consulted. Work-up initiated to r/o cardiomyopathy and PE. Ekg w/ sinus bradycardia. CTA w/o evidence of a PE and TTE w/ mild pulmonary HTN, EF 69%. Cardiac enzymes wnl. BNP elevated to 849, likely 2/2 to the pulmonary edema. She received 2 doses of lasix yesterday w/ diuresis overnight. This AM her symptoms are improved, denies SOB. She had a RUQ sono performed this AM which was equivocal for acute cholecystitis, but did demonstrate gallstones. BPs well controlled overnight.    -No severe BPs overnight  -s/p Keppra x1 dose for seizure ppx  -LFTs elevated on admission, currently being trended: 75/111->171/71->108/192; no lipase or amylase obtained in ED; will add-on to lab; patient clinically improved; RUQ sono results as above; will continue to trend LFTs and if further elevated can consider gen surg consult  -Patient was initially started on Procardia 30XL for BP maintenance but was changed to Hydral 25 TID as per cards, due to bradycardia and concern for exacerbation of the Heart rate  -Cardiology following and recommends maintenance lasix if warranted; will hold off at this time as lungs were clear and patient symptomatically improved  -CTA and TTE as above   -F/u thyroid antibodies; Per discussion w/ Cardiology this AM, while the TSH is elevated, they said it may not be accurate and should be re-checked in one month  -F/u lyme disease panel  -Strict Is/Os  -Reg diet  -SCDs, HSQ, ambulation for DVT ppx    Jaci Mueller PGY-3

## 2020-08-13 NOTE — CONSULT NOTE ADULT - ASSESSMENT
ASSESSMENT: Patient is a 29F 6 days after , re-presented 2 days ago for severe epigastric abdominal pain & found to be severely hypertensive. Found to have cholelithiasis with mild GB wall thickening, elevated transaminases, anemia & elevated LDH.     PLAN:  - No urgent surgical interventions indicated at this time  - Differential includes HELLP and acute cholecystitis  - Recommend trending HELLP labs, and may add a blood smear to evaluate for hemolysis  - Recommend obtaining a HIDA scan (prior to scan, pt will need to pump enough milk for 1-2 days of breast feeding after the scan)  - No need for antibiotics at this time  - Please notify us if patient clinically worsens    Discussed with Dr. Yvoani Billings, PGY-4  B Team Surgery [id number]

## 2020-08-13 NOTE — PROGRESS NOTE ADULT - SUBJECTIVE AND OBJECTIVE BOX
R3 BRAN Progress Note: HD#2 POD#6    Interval events:  On tele, patient with episodes of sinus bradycardia 47, 48, with episodes of normal sinus rhythm.    Patient seen and examined at bedside, no acute overnight events. Patient reports headache this morning. She received Tylenol. Reports mild epigastric pain. Otherwise feels well. No acute complaints. Patient ambulating, tolerating PO, voiding spontaneously. Denies LOF, VB, ctx, HA, epigastric pain, blurred vision, CP, SOB, N/V, fevers, and chills.      Vital Signs Last 24 Hours  T(C): 36.6 (08-13-20 @ 10:00), Max: 37.6 (08-12-20 @ 18:01)  HR: 64 (08-13-20 @ 10:00) (40 - 73)  BP: 122/72 (08-13-20 @ 10:00) (122/50 - 159/94)  RR: 20 (08-13-20 @ 10:00) (16 - 24)  SpO2: 99% (08-13-20 @ 10:00) (97% - 100%)      Physical Exam:  VS: 120/70s, HR 70s, O2 98%  General: NAD  CV: RRR   Pulm: CTAB, however poor respiratory effort  Abdomen: Soft, NT, ND  : Normal lochia  Ext: No pain or swelling      Labs:             11.3   9.61  )-----------( 224      ( 08-13 @ 09:07 )             33.3     08-13 @ 09:07    141  |  105  |  9   ----------------------------<  77  3.5   |  21  |  0.47    Ca    8.9      08-13 @ 09:07  Mg     2.1     08-12 @ 08:39    TPro  6.3  /  Alb  3.2  /  TBili  0.5  /  DBili  x   /  AST  108  /  ALT  192  /  AlkPhos  172  08-13 @ 09:07    PT/INR - ( 08-13 @ 09:07 )   PT: 10.5 SEC;   INR: 0.91     PTT - ( 08-13 @ 09:07 )  PTT:32.9 SEC    Uric Acid: (08-13 @ 09:07)  7.3      Fibrinogen: (08-13 @ 09:07)  629.0    LDH: (08-13 @ 09:07)  408        MEDICATIONS  (STANDING):  heparin   Injectable 5000 Unit(s) SubCutaneous every 12 hours  hydrALAZINE 25 milliGRAM(s) Oral every 8 hours  levETIRAcetam 500 milliGRAM(s) Oral two times a day    MEDICATIONS  (PRN):  acetaminophen   Tablet .. 650 milliGRAM(s) Oral every 6 hours PRN Mild Pain (1 - 3), Moderate Pain (4 - 6)

## 2020-08-13 NOTE — CHART NOTE - NSCHARTNOTEFT_GEN_A_CORE
Patient complains of RUQ pain 8/10. Pepcid given with some relief. General surgery consult called for any further recommendations.     PE:  Vital Signs Last 24 Hrs  T(C): 36.5 (13 Aug 2020 13:58), Max: 37.6 (12 Aug 2020 18:01)  T(F): 97.7 (13 Aug 2020 13:58), Max: 99.7 (12 Aug 2020 18:01)  HR: 68 (13 Aug 2020 13:58) (42 - 73)  BP: 128/76 (13 Aug 2020 13:58) (122/50 - 147/82)  BP(mean): --  RR: 22 (13 Aug 2020 13:58) (17 - 22)  SpO2: 98% (13 Aug 2020 13:58) (97% - 99%)  CV: rrr  Abd: fundus firm, soft NT    Plan:  - Gen Sug consult  - low fat diet  - repeat HELLP labs tomorrow    d/w Dr Hannah Melendrez FNP-BC

## 2020-08-13 NOTE — CONSULT NOTE ADULT - ATTENDING COMMENTS
I have reviewed the history, pertinent labs and imaging, and discussed the care with the consult resident.    The active issues are:  1. abdominal pain and abnormal LFTs    Suggest HIDA scan if feasible, monitor for progression of pain and LFTs.  We will follow.    The Acute Care Surgery (B Team) Attending Group Practice:  Dr. Shaina Pina, Dr. Wilton Gallo, Dr. Horace Bach, Dr. Viktor Trujillo, Dr. Clay Forrest    urgent issues - spectra [phone] or [phone]  nonurgent issues - [phone]  patient appointments or afterhours - [phone]
Personally saw and examined patient  labs and vitals reviewed  agree with above assessment and plan  29F with no past medical hx now presents with cp, sob, LE edema following uncomplicated vaginal delivery.  pt also found to be hypertensive, concerning for preeclampsia with severe features  CTA without evidence of PE, b/l pulm effusions noted. TTE showing LV EF 68% with grossly normal LV systolic function, RV systolic function  would cont diuresis with lasix 40 mg po qd for now as pt with sob and pulm edema on imaging  pt pointing to epigastric region as site of her chest pain, area is TTP as is RUQ. given elevated LFTs, would consider further eval/imaging for abd process.  given HTN and elevated LFTS, preeclampsia a concern, pt started on CCB, given sinus bradycardia, would favor hydralazine for improved bp control, would start with 25 TID and consider increase in am to 50 tid if bp not improved.    further w/u and tx of preeclampsia per ob team.   will cont to follow with you.

## 2020-08-13 NOTE — CONSULT NOTE ADULT - SUBJECTIVE AND OBJECTIVE BOX
GENERAL SURGERY CONSULT NOTE  --------------------------------------------------------------------------------------------    HPI:  Patient is a 29y old now  who is PPD#6 status post  with intrapartum course complicated by gHTN who presented to the ED with a chief complaint of SOB and chest pain.  She was found to have sustained severe range bloods pressures in the ED requiring IV Hydralazine 5mg x1. She denies any n/v, ha, visual changes, but reports intense lower extremity swelling that began following her discharge from he hospital and although she endorses chest pain she points to the epigastric region when asked where the pain is located. Due to complaints on presentation ECHO and CTAngiogram were performed to rule out underlying cardiac pathology vs pulmonary embolism.     CTA only relevant for mild/mod b/l pleural effusions. RUQ sono performed with cholelithiasis & mild wall thickening. LFTs elevated. Gen surg consulted to eval for possible cholecystitis.      PAST MEDICAL & SURGICAL HISTORY:  No pertinent past medical history  No significant past surgical history      FAMILY HISTORY:  [] Family history not pertinent as reviewed with the patient and family      ALLERGIES: No Known Allergies      HOME MEDICATIONS:   NIFEdipine XL 30 milliGRAM(s) Oral daily      CURRENT MEDICATIONS  MEDICATIONS (STANDING): famotidine    Tablet 20 milliGRAM(s) Oral daily  heparin   Injectable 5000 Unit(s) SubCutaneous every 12 hours  hydrALAZINE 25 milliGRAM(s) Oral every 8 hours    MEDICATIONS (PRN):acetaminophen   Tablet .. 650 milliGRAM(s) Oral every 6 hours PRN Mild Pain (1 - 3), Moderate Pain (4 - 6)    --------------------------------------------------------------------------------------------    Vitals:   T(C): 36.5 (20 @ 13:58), Max: 37.6 (20 @ 18:01)  HR: 68 (20 13:58) (56 - 73)  BP: 128/76 (20 @ 13:58) (122/50 - 134/79)  RR: 22 (20 13:58) (17 - 22)  SpO2: 98% (20 13:58) (97% - 99%)  CAPILLARY BLOOD GLUCOSE     07:01  -   07:00  --------------------------------------------------------  IN:  Total IN: 0 mL    OUT:    Voided: 3675 mL  Total OUT: 3675 mL    Total NET: -3675 mL    Height (cm): 157.5 (:)  Weight (kg): 80.3 (:)  BMI (kg/m2): 32.4 (:)  BSA (m2): 1.82 (:)      PHYSICAL EXAM:   General: NAD, Lying in bed comfortably  Neuro: A+Ox3  HEENT: NC/AT, EOMI  Neck: Soft, supple  Cardio: RRR, nml S1/S2  Resp: Good effort, CTA b/l  GI/Abd: Soft, tender in the LUQ and RUQ, non-distended, no rebound/guarding, no masses palpated  Vascular: All 4 extremities warm.  Skin: Intact, no breakdown  Lymphatic/Nodes: No palpable lymphadenopathy  Musculoskeletal: All 4 extremities moving spontaneously, no limitations  --------------------------------------------------------------------------------------------    LABS  CBC (:07)                              11.3<L>                         9.61    )----------------(  224        78.3<H>% Neutrophils, 14.5  % Lymphocytes, ANC: 7.53<H>                              33.3<L>  CBC (:)                              9.5<L>                         8.02    )----------------(  187        67.2  % Neutrophils, 23.6  % Lymphocytes, ANC: 5.39                                29.7<L>    BMP (:07)             141     |  105     |  9     		Ca++ --      Ca 8.9                ---------------------------------( 77    		Mg --                 3.5     |  21<L>   |  0.47<L>			Ph --      BMP (:48)             141     |  107     |  9     		Ca++ --      Ca 8.4                ---------------------------------( 72    		Mg --                 3.2<L>  |  19<L>   |  0.52  			Ph --        LFTs (:07)      TPro 6.3 / Alb 3.2<L> / TBili 0.5 / DBili -- / <H> / <H> / AlkPhos 172<H>  LFTs (:48)      TPro 5.8<L> / Alb 2.9<L> / TBili 0.4 / DBili -- / AST 71<H> / <H> / AlkPhos 171<H>    Coags (:07)  aPTT 32.9 / INR 0.91 / PT 10.5<L>  Coags (:48)  aPTT 30.7 / INR 0.94 / PT 10.8    Cardiac Markers (:48)     HSTrop: 7 / CKMB: 1.18 / CK: 35  Cardiac Markers (:39)     HSTrop: 8 / CKMB: -- / CK: --      VBG ( @ 09:00)     7.38 / 35<L> / 47<H> / 21 / -4.4 / 81.0%     Lactate: 0.8    --------------------------------------------------------------------------------------------    MICROBIOLOGY  Urinalysis ( @ 11:37):     Color: COLORLESS / Appearance: CLEAR / S.022 / pH: 6.5 / Gluc: NEGATIVE / Ketones: NEGATIVE / Bili: NEGATIVE / Urobili: NORMAL / Protein :20 / Nitrites: NEGATIVE / Leuk.Est: LARGE / RBC: >50<H> / WBC: >50<H> / Sq Epi: OCC / Non Sq Epi:  / Bacteria NEGATIVE         --------------------------------------------------------------------------------------------    IMAGING  < from: US Abdomen Limited (20 @ 08:21) >  FINDINGS:    Liver: Within normal limits.  Bile ducts: Normal caliber. Common bile duct measures 5 mm.  Gallbladder: Cholelithiasis. Mild gallbladder wall thickening, measuring up to 5 mm. Sonographic Haines's sign is negative.  Pancreas: Visualized portions are within normal limits.  Right kidney: 11 cm. Very mild hydronephrosis.  Ascites: Trace free fluid at the tip of the liver.  IVC: Visualized portions are within normal limits.  Small right pleural effusion.    IMPRESSION:    Cholelithiasis with gallbladder wall thickening. No focal pain. Findings are equivocal for acute cholecystitis. If there is strong clinical suspicion for acute cholecystitis, then a HIDA scan is advised for further evaluation.  Very mild right hydronephrosis. Trace ascites and small right pleural effusion.    < end of copied text >

## 2020-08-14 ENCOUNTER — TRANSCRIPTION ENCOUNTER (OUTPATIENT)
Age: 30
End: 2020-08-14

## 2020-08-14 LAB
ALBUMIN SERPL ELPH-MCNC: 3 G/DL — LOW (ref 3.3–5)
ALP SERPL-CCNC: 144 U/L — HIGH (ref 40–120)
ALT FLD-CCNC: 124 U/L — HIGH (ref 4–33)
ANION GAP SERPL CALC-SCNC: 15 MMO/L — HIGH (ref 7–14)
APTT BLD: 35.3 SEC — SIGNIFICANT CHANGE UP (ref 27–36.3)
AST SERPL-CCNC: 35 U/L — HIGH (ref 4–32)
BASOPHILS # BLD AUTO: 0.03 K/UL — SIGNIFICANT CHANGE UP (ref 0–0.2)
BASOPHILS NFR BLD AUTO: 0.5 % — SIGNIFICANT CHANGE UP (ref 0–2)
BILIRUB SERPL-MCNC: 0.5 MG/DL — SIGNIFICANT CHANGE UP (ref 0.2–1.2)
BUN SERPL-MCNC: 9 MG/DL — SIGNIFICANT CHANGE UP (ref 7–23)
CALCIUM SERPL-MCNC: 8.6 MG/DL — SIGNIFICANT CHANGE UP (ref 8.4–10.5)
CHLORIDE SERPL-SCNC: 105 MMOL/L — SIGNIFICANT CHANGE UP (ref 98–107)
CO2 SERPL-SCNC: 21 MMOL/L — LOW (ref 22–31)
CREAT SERPL-MCNC: 0.53 MG/DL — SIGNIFICANT CHANGE UP (ref 0.5–1.3)
EOSINOPHIL # BLD AUTO: 0.22 K/UL — SIGNIFICANT CHANGE UP (ref 0–0.5)
EOSINOPHIL NFR BLD AUTO: 3.3 % — SIGNIFICANT CHANGE UP (ref 0–6)
FIBRINOGEN PPP-MCNC: 445 MG/DL — SIGNIFICANT CHANGE UP (ref 290–520)
GLUCOSE SERPL-MCNC: 70 MG/DL — SIGNIFICANT CHANGE UP (ref 70–99)
HCT VFR BLD CALC: 31 % — LOW (ref 34.5–45)
HGB BLD-MCNC: 9.8 G/DL — LOW (ref 11.5–15.5)
IMM GRANULOCYTES NFR BLD AUTO: 0.5 % — SIGNIFICANT CHANGE UP (ref 0–1.5)
INR BLD: 0.94 — SIGNIFICANT CHANGE UP (ref 0.88–1.16)
LDH SERPL L TO P-CCNC: 294 U/L — HIGH (ref 135–225)
LYMPHOCYTES # BLD AUTO: 2.23 K/UL — SIGNIFICANT CHANGE UP (ref 1–3.3)
LYMPHOCYTES # BLD AUTO: 33.6 % — SIGNIFICANT CHANGE UP (ref 13–44)
MCHC RBC-ENTMCNC: 29.3 PG — SIGNIFICANT CHANGE UP (ref 27–34)
MCHC RBC-ENTMCNC: 31.6 % — LOW (ref 32–36)
MCV RBC AUTO: 92.8 FL — SIGNIFICANT CHANGE UP (ref 80–100)
MONOCYTES # BLD AUTO: 0.51 K/UL — SIGNIFICANT CHANGE UP (ref 0–0.9)
MONOCYTES NFR BLD AUTO: 7.7 % — SIGNIFICANT CHANGE UP (ref 2–14)
NEUTROPHILS # BLD AUTO: 3.62 K/UL — SIGNIFICANT CHANGE UP (ref 1.8–7.4)
NEUTROPHILS NFR BLD AUTO: 54.4 % — SIGNIFICANT CHANGE UP (ref 43–77)
NRBC # FLD: 0 K/UL — SIGNIFICANT CHANGE UP (ref 0–0)
PLATELET # BLD AUTO: 237 K/UL — SIGNIFICANT CHANGE UP (ref 150–400)
PMV BLD: 11.8 FL — SIGNIFICANT CHANGE UP (ref 7–13)
POTASSIUM SERPL-MCNC: 3.5 MMOL/L — SIGNIFICANT CHANGE UP (ref 3.5–5.3)
POTASSIUM SERPL-SCNC: 3.5 MMOL/L — SIGNIFICANT CHANGE UP (ref 3.5–5.3)
PROT SERPL-MCNC: 5.6 G/DL — LOW (ref 6–8.3)
PROTHROM AB SERPL-ACNC: 10.7 SEC — SIGNIFICANT CHANGE UP (ref 10.6–13.6)
RBC # BLD: 3.34 M/UL — LOW (ref 3.8–5.2)
RBC # FLD: 13.2 % — SIGNIFICANT CHANGE UP (ref 10.3–14.5)
SODIUM SERPL-SCNC: 141 MMOL/L — SIGNIFICANT CHANGE UP (ref 135–145)
TSI ACT/NOR SER: <0.1 IU/L — SIGNIFICANT CHANGE UP (ref 0–0.55)
URATE SERPL-MCNC: 7 MG/DL — SIGNIFICANT CHANGE UP (ref 2.5–7)
WBC # BLD: 6.64 K/UL — SIGNIFICANT CHANGE UP (ref 3.8–10.5)
WBC # FLD AUTO: 6.64 K/UL — SIGNIFICANT CHANGE UP (ref 3.8–10.5)

## 2020-08-14 PROCEDURE — 99231 SBSQ HOSP IP/OBS SF/LOW 25: CPT | Mod: GC

## 2020-08-14 PROCEDURE — 93010 ELECTROCARDIOGRAM REPORT: CPT

## 2020-08-14 PROCEDURE — 78226 HEPATOBILIARY SYSTEM IMAGING: CPT | Mod: 26,GC

## 2020-08-14 PROCEDURE — 99233 SBSQ HOSP IP/OBS HIGH 50: CPT

## 2020-08-14 RX ORDER — HYDRALAZINE HCL 50 MG
5 TABLET ORAL ONCE
Refills: 0 | Status: COMPLETED | OUTPATIENT
Start: 2020-08-14 | End: 2020-08-14

## 2020-08-14 RX ORDER — HYDRALAZINE HCL 50 MG
50 TABLET ORAL EVERY 8 HOURS
Refills: 0 | Status: DISCONTINUED | OUTPATIENT
Start: 2020-08-14 | End: 2020-08-15

## 2020-08-14 RX ORDER — SODIUM CHLORIDE 9 MG/ML
1000 INJECTION, SOLUTION INTRAVENOUS
Refills: 0 | Status: DISCONTINUED | OUTPATIENT
Start: 2020-08-14 | End: 2020-08-14

## 2020-08-14 RX ORDER — HYDRALAZINE HCL 50 MG
10 TABLET ORAL ONCE
Refills: 0 | Status: COMPLETED | OUTPATIENT
Start: 2020-08-14 | End: 2020-08-14

## 2020-08-14 RX ORDER — FAMOTIDINE 10 MG/ML
20 INJECTION INTRAVENOUS DAILY
Refills: 0 | Status: DISCONTINUED | OUTPATIENT
Start: 2020-08-14 | End: 2020-08-14

## 2020-08-14 RX ORDER — ACETAMINOPHEN 500 MG
1000 TABLET ORAL ONCE
Refills: 0 | Status: COMPLETED | OUTPATIENT
Start: 2020-08-14 | End: 2020-08-14

## 2020-08-14 RX ORDER — ACETAMINOPHEN 500 MG
975 TABLET ORAL EVERY 6 HOURS
Refills: 0 | Status: DISCONTINUED | OUTPATIENT
Start: 2020-08-14 | End: 2020-08-17

## 2020-08-14 RX ADMIN — Medication 400 MILLIGRAM(S): at 05:48

## 2020-08-14 RX ADMIN — Medication 25 MILLIGRAM(S): at 05:19

## 2020-08-14 RX ADMIN — FAMOTIDINE 20 MILLIGRAM(S): 10 INJECTION INTRAVENOUS at 05:31

## 2020-08-14 RX ADMIN — Medication 975 MILLIGRAM(S): at 21:00

## 2020-08-14 RX ADMIN — Medication 25 MILLIGRAM(S): at 13:33

## 2020-08-14 RX ADMIN — Medication 975 MILLIGRAM(S): at 20:00

## 2020-08-14 RX ADMIN — HEPARIN SODIUM 5000 UNIT(S): 5000 INJECTION INTRAVENOUS; SUBCUTANEOUS at 22:32

## 2020-08-14 RX ADMIN — Medication 10 MILLIGRAM(S): at 18:50

## 2020-08-14 RX ADMIN — Medication 1000 MILLIGRAM(S): at 06:15

## 2020-08-14 RX ADMIN — Medication 25 MILLIGRAM(S): at 22:32

## 2020-08-14 RX ADMIN — Medication 5 MILLIGRAM(S): at 18:21

## 2020-08-14 RX ADMIN — HEPARIN SODIUM 5000 UNIT(S): 5000 INJECTION INTRAVENOUS; SUBCUTANEOUS at 05:19

## 2020-08-14 NOTE — DISCHARGE NOTE PROVIDER - CARE PROVIDER_API CALL
Mitul Colby,   Phone: (   )    -  Fax: (   )    -  Follow Up Time: 1 week Ambulatory Care Unit - OBGYN,   [address] [place] [address]  Oncology [facility], 3rd [place] [address]  Phone: [phone]  Fax: (   )    -  Follow Up Time: 1 week    [facility] at [address] [address]  Phone: [phone]  Fax: (   )    -  Follow Up Time: 2 weeks

## 2020-08-14 NOTE — DISCHARGE NOTE PROVIDER - NSDCMRMEDTOKEN_GEN_ALL_CORE_FT
Colace 100 mg oral capsule: 1 cap(s) orally 2 times a day  electronic blood pressure monitor:   ferrous sulfate 325 mg (65 mg elemental iron) oral tablet: 1 tab(s) orally 2 times a day  ibuprofen 600 mg oral tablet: 1 tab(s) orally every 6 hours acetaminophen 325 mg oral tablet: 3 tab(s) orally every 6 hours, As needed, Severe Pain (7 - 10)  Colace 100 mg oral capsule: 1 cap(s) orally 2 times a day  electronic blood pressure monitor:   ferrous sulfate 325 mg (65 mg elemental iron) oral tablet: 1 tab(s) orally 2 times a day  ibuprofen 600 mg oral tablet: 1 tab(s) orally every 6 hours acetaminophen 325 mg oral tablet: 3 tab(s) orally every 6 hours, As needed, Severe Pain (7 - 10)  Colace 100 mg oral capsule: 1 cap(s) orally 2 times a day  electronic blood pressure monitor:   ferrous sulfate 325 mg (65 mg elemental iron) oral tablet: 1 tab(s) orally 2 times a day  hydrALAZINE 25 mg oral tablet: 3 tab(s) orally every 8 hours MDD:9 tabs  ibuprofen 600 mg oral tablet: 1 tab(s) orally every 6 hours

## 2020-08-14 NOTE — PROGRESS NOTE ADULT - ASSESSMENT
A/P: 28y/o  PPD#7 s/p , intrapartum course c/b gHTN, presented to the ED with severe range BPs, meeting criteria for sPEC. Hydral 5mg IVP administered in the ED. Patient found to have pulmonary edema, and is now s/p keppra for seizure ppx and s/p lasix for diuresis. She also had bradycardia on presentation, Cardiology consulted. Work-up initiated to r/o cardiomyopathy and PE. Ekg w/ sinus bradycardia. CTA w/o evidence of a PE and TTE w/ mild pulmonary HTN, EF 69%. Cardiac enzymes wnl. BNP elevated to 849, likely 2/2 to the pulmonary edema. SOB has resolved with diuresis. 1 severe range BP overnight: SBP 160s with subsequent pressures 140s. This morning HR as low as 27 and cardiology was notified. Repeat HR manually 44. Regarding her epigastric pain, RUQ sono was equivocal for acute cholecystitis, but did demonstrate gallstones. She had increased severe pain on exam this morning. LFTs elevated on admission, currently being trended: 75/111->171/71->108/192. AM LFTs pending. Afebrile overnight. Lyme disease panel wnl.    - f/u AM HELLP labs- amylase/lipase (): 15.2/35  - HIDA scan to be obtained today per Gen Surg  - c/w Hydral 25 TID as per cards, due to bradycardia and concern for exacerbation of the HR  - pepcid administered for epigastric pain  - tylenol IV  - Cardiology following: reconsulted for tele events this AM  - f/u STAT EKG  - F/u thyroid antibodies; recheck TSH x1mo (unclear accuracy per Cards)  - Strict Is/Os  - Reg diet  - SCDs, HSQ, ambulation for DVT ppx  - appreciate Cardiology and Gen Surg input    ANDRY Montero PGY3

## 2020-08-14 NOTE — PROVIDER CONTACT NOTE (OTHER) - BACKGROUND
PP readmit c/o SOB & CP. Severe BP's
PP readmit with  , admitted for c/o SOB, CP, and severe rang BP's.
pt admitted for PEC post partum, delivered 8/7/2020.

## 2020-08-14 NOTE — PROVIDER CONTACT NOTE (OTHER) - ACTION/TREATMENT ORDERED:
will continue to assess.
Administer hydralazine 25mg PO. Repeat BP in 15min.  Cardiology consult, hepatobiliary scan.
will order K+ repletion

## 2020-08-14 NOTE — PROGRESS NOTE ADULT - ASSESSMENT
ASSESSMENT: Patient is a 29F 6 days after , re-presented 2 days ago for severe epigastric abdominal pain & found to be severely hypertensive. Found to have cholelithiasis with mild GB wall thickening, elevated transaminases, anemia & elevated LDH.     PLAN:  - No urgent surgical intervention  - Differential includes HELLP and acute cholecystitis  - f/u HIDA results  - No need for antibiotics at this time  - Surgery will follow    Ashwin Enriquez, PGY-1  B Surgery Pager #[phone]

## 2020-08-14 NOTE — PROGRESS NOTE ADULT - SUBJECTIVE AND OBJECTIVE BOX
GENERAL SURGERY PROGRESS NOTE    SUBJECTIVE:  Pt examined at bedside  Reports epigastric pain  Denies nausea, vomiting  Is passing gas and having bowel movements. Denies diarrhea    OBJECTIVE:  Vital Signs Last 24 Hrs  T(C): 36.3 (14 Aug 2020 09:46), Max: 36.8 (13 Aug 2020 18:24)  T(F): 97.4 (14 Aug 2020 09:46), Max: 98.3 (13 Aug 2020 18:24)  HR: 50 (14 Aug 2020 09:46) (44 - 68)  BP: 136/85 (14 Aug 2020 09:46) (128/76 - 167/88)  BP(mean): --  RR: 22 (14 Aug 2020 09:46) (17 - 22)  SpO2: 99% (14 Aug 2020 09:46) (98% - 100%)    Physical Examination:  GEN: NAD, resting quietly  NEURO: AAOx3, CN II-XII grossly intact, no focal deficits  PULM: symmetric chest rise bilaterally, no increased WOB  ABD: soft, tender, nondistended  EXTR: no LE erythema, moving all extremities  VASC: LE warm and well-perfused    LABS:                        9.8    6.64  )-----------( 237      ( 14 Aug 2020 06:45 )             31.0       08-14    141  |  105  |  9   ----------------------------<  70  3.5   |  21<L>  |  0.53    Ca    8.6      14 Aug 2020 06:45    TPro  5.6<L>  /  Alb  3.0<L>  /  TBili  0.5  /  DBili  x   /  AST  35<H>  /  ALT  124<H>  /  AlkPhos  144<H>  08-14

## 2020-08-14 NOTE — PROVIDER CONTACT NOTE (OTHER) - ASSESSMENT
BP: 125/67  HR:56  SO2%:97 on room air  RR: 22     T: 36.4 oral
HR:44  BP:166/89  RR:20  SO2%:100 RA  T:36.5 oral  Pt denies HA or blurry vison. PT endorses epigastric pain.
No acute distress at this time, see flow sheet for vitals. Pt receiving furosemide, one dose this am and another dose in pm (see emar)

## 2020-08-14 NOTE — DISCHARGE NOTE PROVIDER - NSDCCPCAREPLAN_GEN_ALL_CORE_FT
PRINCIPAL DISCHARGE DIAGNOSIS  Diagnosis: Preeclampsia in postpartum period  Assessment and Plan of Treatment: - Continue BP meds as prescribed (hold is BP is under 110/60)  -Take blood pressure with at home cuff prior to taking medications; if BP is >150/90 call MD  - Return to hospital with headaches, visual changes, abdominal pain, nausea, vomiting, chest pain or shortness of breathe  - Follow up with OB in 2 days for BP check  - Follow up with Cardiology      SECONDARY DISCHARGE DIAGNOSES  Diagnosis: Pulmonary edema  Assessment and Plan of Treatment: PRINCIPAL DISCHARGE DIAGNOSIS  Diagnosis: Preeclampsia in postpartum period  Assessment and Plan of Treatment: - Continue BP meds as prescribed (hold is BP is under 110/60)  -Take blood pressure with at home cuff prior to taking medications; if BP is >150/90 Call the office for an appointment MD  - Return to hospital with headaches, visual changes, abdominal pain, nausea, vomiting, chest pain or shortness of breathe  - Home care will be set up by case management so that a BP check can be performed at home in 2-3 days   - Follow up with OB in one week for BP check  - Follow up with Cardiology in one week      SECONDARY DISCHARGE DIAGNOSES  Diagnosis: Pulmonary edema  Assessment and Plan of Treatment: -Resolved with lasix  -If you experience shortness of breath, please contact your doctor PRINCIPAL DISCHARGE DIAGNOSIS  Diagnosis: Preeclampsia in postpartum period  Assessment and Plan of Treatment: - Continue your BP meds as prescribed (hold is BP is under 110/60)  -Take your blood pressure at home three times a day prior to taking medications; if your BP is >150/90 Call the office for an appointment with an MD  - Return to the hospital with headaches, visual changes, abdominal pain, nausea, vomiting, chest pain or shortness of breathe  - Home care will be set up by case management so that a BP check can be performed at home in 2-3 days   - Follow up with OB in one week for BP check      SECONDARY DISCHARGE DIAGNOSES  Diagnosis: Epigastric pain  Assessment and Plan of Treatment: -Epigastric pain resolved  -You have stones in your gallblader. Please follow-up with your primary care provider outpatient for management    Diagnosis: Bradycardia  Assessment and Plan of Treatment: -Follow-up outpatient with Cardiology in 2 weeks    Diagnosis: Pulmonary edema  Assessment and Plan of Treatment: -Resolved with lasix  -If you experience shortness of breath, please contact our office

## 2020-08-14 NOTE — DISCHARGE NOTE PROVIDER - HOSPITAL COURSE
28y/o  s/p  at [facility], intrapartum course c/b gHTN, presented to the ED with severe range BPs, meeting criteria for sPEC. Hydral 5mg IVP administered in the ED. Patient found to have pulmonary edema, and is now s/p keppra for seizure ppx and s/p lasix for diuresis. She also had bradycardia on presentation, Cardiology consulted. Work-up initiated to r/o cardiomyopathy and PE. Ekg w/ sinus bradycardia. CTA w/o evidence of a PE and TTE w/ mild pulmonary HTN, EF 69%. Cardiac enzymes wnl. BNP elevated to 849, likely 2/2 to the pulmonary edema. SOB has resolved with diuresis. Epigastric pain worsened, RUQ sono was equivocal for acute cholecystitis, but did demonstrate gallstones.Gen Surgery consulted and following: Recommends HIDA scan. HIDA scan___________________.    LFTs elevated  75/111->171/71->108/192. Lyme disease panel wnl. 28y/o  s/p  at [facility], intrapartum course c/b FrantzTN, presented to the ED with severe range BPs, meeting criteria for sPEC. Hydral 5mg IVP administered in the ED. Patient found to have pulmonary edema, and is now s/p keppra for seizure ppx and s/p lasix for diuresis. She also had bradycardia on presentation, Cardiology consulted. Work-up initiated to r/o cardiomyopathy and PE. Ekg w/ sinus bradycardia. CTA w/o evidence of a PE and TTE  unremarkable, EF 69%. Cardiac enzymes wnl. BNP elevated to 849, likely 2/2 to the pulmonary edema. SOB has resolved with diuresis. Epigastric pain worsened, RUQ sono was equivocal for acute cholecystitis, but did demonstrate gallstones. General Surgery consulted and following: Recommends HIDA scan. HIDA scan without evidence of acute cholecystitis. LFTs elevated on admission but decreasin/111->171/71->108/192-> 35/124->22/93. Lyme disease panel wnl. On the day of discharge, the patient's pain was improved, blood pressures well controlled, tolerating a regular diet, and voiding freely. 30y/o  s/p  at [facility], intrapartum course c/b FrantzTN, presented to the ED with severe range BPs, meeting criteria for sPEC. Hydral 5mg IVP administered in the ED. Patient found to have pulmonary edema, and is now s/p keppra for seizure ppx and s/p lasix for diuresis. She also had bradycardia on presentation, Cardiology consulted. Work-up initiated to r/o cardiomyopathy and PE. Ekg w/ sinus bradycardia. CTA w/o evidence of a PE and TTE  unremarkable, EF 69%. Cardiac enzymes wnl. BNP elevated to 849, likely 2/2 to the pulmonary edema. SOB has resolved with diuresis. Lyme disease panel wnl. Patient to follow-up with Cardiology outpatient. Epigastric pain worsened, RUQ sono was equivocal for acute cholecystitis, but did demonstrate gallstones. General Surgery consulted and following: Recommends HIDA scan. HIDA scan without evidence of acute cholecystitis. LFTs elevated on admission but decreased from 75/11 to 27/71. On the day of discharge, the patient's epigastric pain resolved, tolerating a regular diet, headache resolved, and blood pressures well controlled on medication.

## 2020-08-14 NOTE — PROGRESS NOTE ADULT - SUBJECTIVE AND OBJECTIVE BOX
MFM Fellow- late entry. Patient seen at 10am    Patient seen at bedside. No acute complaints. Earlier complaints of severe epigastric pain currently resolved. Denies any N/V, fevers/chills. Denies any CP. States that the pain comes without known exacerbating factor, does not always come after eating.   Overnight, patient noted to have HR of 27. Alerted by telemetry. Patient with no acute complaints at the time, was resting comfortably     Vital Signs Last 24 Hrs  T(C): 36.3 (14 Aug 2020 09:46), Max: 36.8 (13 Aug 2020 18:24)  T(F): 97.4 (14 Aug 2020 09:46), Max: 98.3 (13 Aug 2020 18:24)  HR: 50 (14 Aug 2020 09:46) (44 - 68)  BP: 136/85 (14 Aug 2020 09:46) (128/76 - 167/88)  RR: 22 (14 Aug 2020 09:46) (17 - 22)  SpO2: 99% (14 Aug 2020 09:46) (98% - 100%)    LABS:                        9.8    6.64  )-----------( 237      ( 14 Aug 2020 06:45 )             31.0     08-14    141  |  105  |  9   ----------------------------<  70  3.5   |  21<L>  |  0.53    Ca    8.6      14 Aug 2020 06:45    TPro  5.6<L>  /  Alb  3.0<L>  /  TBili  0.5  /  DBili  x   /  AST  35<H>  /  ALT  124<H>  /  AlkPhos  144<H>  08-14    PT/INR - ( 14 Aug 2020 06:45 )   PT: 10.7 SEC;   INR: 0.94          PTT - ( 14 Aug 2020 06:45 )  PTT:35.3 SEC      Abdominal sono: IMPRESSION:  Cholelithiasis with gallbladder wall thickening. No focal pain. Findings are equivocal for acute cholecystitis. If there is strong clinical suspicion for acute cholecystitis, then a HIDA scan is advised for further evaluation.  Very mild right hydronephrosis. Trace ascites and small right pleural effusion.    Ekg w/ sinus bradycardia. CTA w/o evidence of a PE and TTE w/ mild pulmonary HTN, EF 69%. Cardiac enzymes wnl. BNP elevated to 849, likely 2/2 to the pulmonary edema. She received 2 doses of lasix

## 2020-08-14 NOTE — PROGRESS NOTE ADULT - ASSESSMENT
A/P: 28y/o  PPD#7 s/p  (c/b gHTN) admitted for sPEC c/b pulmonary edema, bradycardia, and epigastric pain.

## 2020-08-14 NOTE — PROGRESS NOTE ADULT - SUBJECTIVE AND OBJECTIVE BOX
DAUGHTER DAV CALLING/ PT WAS DISCHARGED FROM [facility] TODAY/ SHE WAS BRINGING HER IN THE HOUSE AND SHE FELL AGAIN/ THEY ARE ON THEIR WAY BACK TO [facility] NOW/ SHE IS LOOKING FOR SOME GUIDANCE /PLEASE CALL THANKS   OB Postpartum Note:  Delivery, POD#3    S: 30yo PPD 7 s/p . Called to bedside for tele event HR 27. Recheck after notification was 44, at which SBP 160s. Patient was complaining of severe epigastric pain and RUQ pain radiating to her back that was causing her to take short shallow breaths. Slept through the night. No recent pain meds. Denies CP. Denies lightheadedness/dizziness. Denies N/V.    O:  Vitals:  Vital Signs Last 24 Hrs  T(C): 36.7 (14 Aug 2020 05:57), Max: 36.8 (13 Aug 2020 18:24)  T(F): 98 (14 Aug 2020 05:57), Max: 98.3 (13 Aug 2020 18:24)  HR: 45 (14 Aug 2020 05:57) (44 - 68)  BP: 140/87 (14 Aug 2020 05:57) (122/72 - 167/88)  BP(mean): --  RR: 18 (14 Aug 2020 05:57) (17 - 22)  SpO2: 98% (14 Aug 2020 05:57) (98% - 100%)    MEDICATIONS  (STANDING):  famotidine    Tablet 20 milliGRAM(s) Oral daily  famotidine  IVPB 20 milliGRAM(s) IV Intermittent daily  heparin   Injectable 5000 Unit(s) SubCutaneous every 12 hours  hydrALAZINE 25 milliGRAM(s) Oral every 8 hours    MEDICATIONS  (PRN):  acetaminophen   Tablet .. 650 milliGRAM(s) Oral every 6 hours PRN Mild Pain (1 - 3), Moderate Pain (4 - 6)      LABS:  Blood type: A Positive  Rubella IgG: RPR: Negative                          11.3<L>   9.61 >-----------< 224    (  @ 09:07 )             33.3<L>                        9.5<L>   8.02 >-----------< 187    (  @ 13:48 )             29.7<L>                        9.6<L>   7.87 >-----------< 179    (  @ 08:39 )             28.9<L>    20 @ 09:07      141  |  105  |  9   ----------------------------<  77  3.5   |  21<L>  |  0.47<L>    20 @ 13:48      141  |  107  |  9   ----------------------------<  72  3.2<L>   |  19<L>  |  0.52    20 @ 08:39      142  |  109<H>  |  9   ----------------------------<  78  3.3<L>   |  20<L>  |  0.58        Ca    8.9      13 Aug 2020 09:07  Ca    8.4      12 Aug 2020 13:48  Ca    8.6      12 Aug 2020 08:39  Mg     2.1         TPro  6.3  /  Alb  3.2<L>  /  TBili  0.5  /  DBili  x   /  AST  108<H>  /  ALT  192<H>  /  AlkPhos  172<H>  20 @ 09:07  TPro  5.8<L>  /  Alb  2.9<L>  /  TBili  0.4  /  DBili  x   /  AST  71<H>  /  ALT  117<H>  /  AlkPhos  171<H>  20 @ 13:48  TPro  5.8<L>  /  Alb  3.1<L>  /  TBili  0.4  /  DBili  x   /  AST  75<H>  /  ALT  111<H>  /  AlkPhos  178<H>  20 @ 08:39          Physical exam:  Gen: fetal position, tachypneic   Heart: RRR  Lungs: CTAB  Abdomen: upper abdomen TTP, +RUQ and epigastric guarding. No rebound. Soft, mildly distended.  Pelvic: Normal lochia noted  Ext: No calf tenderness

## 2020-08-14 NOTE — PROGRESS NOTE ADULT - ASSESSMENT
30 y/o female  s/p vaginal delivery 20, with no significant pmh who presented to the ED due to epigastric/chest pain, SOB, and bilateral lower extremity edema.       # Epigastric/chest pain, SOB, and LE edema  - CTA negative for PE.  - TTE showing grossly normal LV and RV systolic function. pt does not currently appear grossly volume overloaded  - EKG demonstrated sinus bradycardia with T wave inversions. tele reviewed.   -cont to monitor on tele, would hold off on avn blockers in the tx of her htn  - also found to have epigastric and RUQ tenderness on exam.  further w/u per primary team. bradycardia may be explained by increased vagal tone 2/2 abd pain.     # HTN  - w/u and eval of preeclampsia per primary team  - C/w Hydralazine 25 TID, can increase to 50 tid if persistently elevated >140 systolic.

## 2020-08-14 NOTE — DISCHARGE NOTE PROVIDER - EXTENDED VTE YES NO FOR MLM ENOXAPARIN
Trixie 10, 2019       Alber Christensen MD  [address] [facility] [address]  VIA In Basket      Patient: Kirsten Salvador   YOB: 1956   Date of Visit: 6/10/2019       Dear Dr. Christensen:    Thank you for referring Kirsten Salvador to me for evaluation. Below are my notes for this visit with her.    If you have questions, please do not hesitate to call me. I look forward to following your patient along with you.      Sincerely,        Abel Fink MD        CC: No Recipients               ,

## 2020-08-14 NOTE — PROVIDER CONTACT NOTE (OTHER) - SITUATION
Tele tech called unit to advise of HR down to 47 and then back to 55
PP readmit on Telemonitoring. Tele tech called to advise RN of HR of 27
Pt noted to have potassium level of 3.2

## 2020-08-14 NOTE — DISCHARGE NOTE PROVIDER - PROVIDER TOKENS
FREE:[LAST:[Mitul Colby],PHONE:[(   )    -],FAX:[(   )    -],FOLLOWUP:[1 week]] FREE:[LAST:[Ambulatory Care Unit - OBGYN],PHONE:[phone]],FAX:[(   )    -],ADDRESS:[[address] [place] [address], [address]rd [place] [address]],FOLLOWUP:[1 week]],FREE:[LAST:[[facility] at [facility]],PHONE:[phone]],FAX:[(   )    -],ADDRESS:[[address] [place] [address]],FOLLOWUP:[2 weeks]]

## 2020-08-14 NOTE — PROGRESS NOTE ADULT - SUBJECTIVE AND OBJECTIVE BOX
24H hour events:   pt resting, denies cp, sob, palpitations, syncope  tele: sinus 40s-50s    MEDICATIONS:  heparin   Injectable 5000 Unit(s) SubCutaneous every 12 hours  hydrALAZINE 25 milliGRAM(s) Oral every 8 hours          famotidine Injectable 20 milliGRAM(s) IV Push daily      lactated ringers. 1000 milliLiter(s) IV Continuous <Continuous>          PHYSICAL EXAM:  T(C): 36.3 (08-14-20 @ 09:46), Max: 36.8 (08-13-20 @ 18:24)  HR: 50 (08-14-20 @ 09:46) (44 - 68)  BP: 136/85 (08-14-20 @ 09:46) (128/76 - 167/88)  RR: 22 (08-14-20 @ 09:46) (17 - 22)  SpO2: 99% (08-14-20 @ 09:46) (98% - 100%)  Wt(kg): --  I&O's Summary    13 Aug 2020 07:01  -  14 Aug 2020 07:00  --------------------------------------------------------  IN: 0 mL / OUT: 1170 mL / NET: -1170 mL        Appearance: Normal	  HEENT:   Normal oral mucosa, PERRL, EOMI	  Lymphatic: No lymphadenopathy  Cardiovascular: Normal S1 S2, No JVD, No murmurs, No edema  Respiratory: Lungs clear to auscultation	  Psychiatry: A & O x 3, Mood & affect appropriate  Gastrointestinal:  Soft, mildly-tender to palpation RUQ without rebound or guarding, + BS	  Skin: No rashes, No ecchymoses, No cyanosis	  Neurologic: Non-focal  Extremities: Normal range of motion, No clubbing, cyanosis       LABS:	 	    CBC Full  -  ( 14 Aug 2020 06:45 )  WBC Count : 6.64 K/uL  Hemoglobin : 9.8 g/dL  Hematocrit : 31.0 %  Platelet Count - Automated : 237 K/uL  Mean Cell Volume : 92.8 fL  Mean Cell Hemoglobin : 29.3 pg  Mean Cell Hemoglobin Concentration : 31.6 %  Auto Neutrophil # : 3.62 K/uL  Auto Lymphocyte # : 2.23 K/uL  Auto Monocyte # : 0.51 K/uL  Auto Eosinophil # : 0.22 K/uL  Auto Basophil # : 0.03 K/uL  Auto Neutrophil % : 54.4 %  Auto Lymphocyte % : 33.6 %  Auto Monocyte % : 7.7 %  Auto Eosinophil % : 3.3 %  Auto Basophil % : 0.5 %    08-14    141  |  105  |  9   ----------------------------<  70  3.5   |  21<L>  |  0.53  08-13    141  |  105  |  9   ----------------------------<  77  3.5   |  21<L>  |  0.47<L>    Ca    8.6      14 Aug 2020 06:45  Ca    8.9      13 Aug 2020 09:07    TPro  5.6<L>  /  Alb  3.0<L>  /  TBili  0.5  /  DBili  x   /  AST  35<H>  /  ALT  124<H>  /  AlkPhos  144<H>  08-14  TPro  6.3  /  Alb  3.2<L>  /  TBili  0.5  /  DBili  x   /  AST  108<H>  /  ALT  192<H>  /  AlkPhos  172<H>  08-13      proBNP: Serum Pro-Brain Natriuretic Peptide: 849.5 pg/mL (08-12-20 @ 08:39)    Lipid Profile:   HgA1c:   TSH:       CARDIAC MARKERS:            TELEMETRY: 	    ECG:  	  RADIOLOGY:  OTHER: 	    PREVIOUS DIAGNOSTIC TESTING:    [ ] Echocardiogram:  [ ]  Catheterization:  [ ] Stress Test:  	  	  ASSESSMENT/PLAN:

## 2020-08-14 NOTE — PROGRESS NOTE ADULT - PROBLEM SELECTOR PLAN 1
s/p Keppra; BP well controlled.   Currently on hydralazine 25mg TID per cardiology recommendations due to bradycardia. Currently on telemetry. Will appreciate cardiology consultation due to bradycardia noted overnight. Will discuss if patient needs outpatient holter monitoring   Low suspicion for HELLP syndrome at this time- plts WNL, LFTs decreasing, no signs of hemolysis   Patient scheduled for HIDA scan at 3pm; appreciate surgery recommendations after scan    Seen with Dr. Hannah Muro MD  TSH elevated, however T3/T4 WNL. TPO negative.

## 2020-08-14 NOTE — DISCHARGE NOTE PROVIDER - NSDCFUADDAPPT_GEN_ALL_CORE_FT
- Continue BP meds as prescribed (hold is BP is under 110/60)  -Take blood pressure with at home cuff prior to taking medications; if BP is >150/90 call MD  - Return to hospital with headaches, visual changes, abdominal pain, nausea, vomiting, chest pain or shortness of breathe  - Follow up with OB in 2 days for BP check  - Follow up with Cardiology - Continue BP meds as prescribed (hold is BP is under 110/60)  -Take blood pressure with at home cuff prior to taking medications; if BP is >150/90 call MD  - Return to hospital with headaches, visual changes, abdominal pain, nausea, vomiting, chest pain or shortness of breathe  - Home care to be scheduled for a nurse to perform a BP check at home in 2-3 days  - Follow up with OB at [place] in one week for BP check  - Follow up with Cardiology in one week for bradycardia - Follow up with our OB clinic in one week for a BP check  - Follow up with Cardiology in two weeks for bradycardia  - Follow up with your primary care physician for gallstones

## 2020-08-14 NOTE — PROVIDER CONTACT NOTE (OTHER) - RECOMMENDATIONS
continue with telemonitoring. VS Q4
continue to telemonitoring, pt to be assessed.
replete potassium

## 2020-08-15 LAB
ALBUMIN SERPL ELPH-MCNC: 3 G/DL — LOW (ref 3.3–5)
ALP SERPL-CCNC: 132 U/L — HIGH (ref 40–120)
ALT FLD-CCNC: 93 U/L — HIGH (ref 4–33)
ANION GAP SERPL CALC-SCNC: 14 MMO/L — SIGNIFICANT CHANGE UP (ref 7–14)
APTT BLD: 32.7 SEC — SIGNIFICANT CHANGE UP (ref 27–36.3)
AST SERPL-CCNC: 22 U/L — SIGNIFICANT CHANGE UP (ref 4–32)
BASOPHILS # BLD AUTO: 0.03 K/UL — SIGNIFICANT CHANGE UP (ref 0–0.2)
BASOPHILS NFR BLD AUTO: 0.5 % — SIGNIFICANT CHANGE UP (ref 0–2)
BILIRUB SERPL-MCNC: 0.3 MG/DL — SIGNIFICANT CHANGE UP (ref 0.2–1.2)
BUN SERPL-MCNC: 13 MG/DL — SIGNIFICANT CHANGE UP (ref 7–23)
CALCIUM SERPL-MCNC: 8.8 MG/DL — SIGNIFICANT CHANGE UP (ref 8.4–10.5)
CHLORIDE SERPL-SCNC: 106 MMOL/L — SIGNIFICANT CHANGE UP (ref 98–107)
CO2 SERPL-SCNC: 21 MMOL/L — LOW (ref 22–31)
CREAT SERPL-MCNC: 0.63 MG/DL — SIGNIFICANT CHANGE UP (ref 0.5–1.3)
EOSINOPHIL # BLD AUTO: 0.21 K/UL — SIGNIFICANT CHANGE UP (ref 0–0.5)
EOSINOPHIL NFR BLD AUTO: 3.3 % — SIGNIFICANT CHANGE UP (ref 0–6)
FIBRINOGEN PPP-MCNC: 497 MG/DL — SIGNIFICANT CHANGE UP (ref 290–520)
GLUCOSE SERPL-MCNC: 71 MG/DL — SIGNIFICANT CHANGE UP (ref 70–99)
HCT VFR BLD CALC: 31 % — LOW (ref 34.5–45)
HGB BLD-MCNC: 10.1 G/DL — LOW (ref 11.5–15.5)
IMM GRANULOCYTES NFR BLD AUTO: 0.5 % — SIGNIFICANT CHANGE UP (ref 0–1.5)
INR BLD: 0.95 — SIGNIFICANT CHANGE UP (ref 0.88–1.16)
LDH SERPL L TO P-CCNC: 272 U/L — HIGH (ref 135–225)
LYMPHOCYTES # BLD AUTO: 2.38 K/UL — SIGNIFICANT CHANGE UP (ref 1–3.3)
LYMPHOCYTES # BLD AUTO: 37 % — SIGNIFICANT CHANGE UP (ref 13–44)
MCHC RBC-ENTMCNC: 29.7 PG — SIGNIFICANT CHANGE UP (ref 27–34)
MCHC RBC-ENTMCNC: 32.6 % — SIGNIFICANT CHANGE UP (ref 32–36)
MCV RBC AUTO: 91.2 FL — SIGNIFICANT CHANGE UP (ref 80–100)
MONOCYTES # BLD AUTO: 0.48 K/UL — SIGNIFICANT CHANGE UP (ref 0–0.9)
MONOCYTES NFR BLD AUTO: 7.5 % — SIGNIFICANT CHANGE UP (ref 2–14)
NEUTROPHILS # BLD AUTO: 3.3 K/UL — SIGNIFICANT CHANGE UP (ref 1.8–7.4)
NEUTROPHILS NFR BLD AUTO: 51.2 % — SIGNIFICANT CHANGE UP (ref 43–77)
NRBC # FLD: 0 K/UL — SIGNIFICANT CHANGE UP (ref 0–0)
PLATELET # BLD AUTO: 258 K/UL — SIGNIFICANT CHANGE UP (ref 150–400)
PMV BLD: 11.7 FL — SIGNIFICANT CHANGE UP (ref 7–13)
POTASSIUM SERPL-MCNC: 3.6 MMOL/L — SIGNIFICANT CHANGE UP (ref 3.5–5.3)
POTASSIUM SERPL-SCNC: 3.6 MMOL/L — SIGNIFICANT CHANGE UP (ref 3.5–5.3)
PROT SERPL-MCNC: 5.6 G/DL — LOW (ref 6–8.3)
PROTHROM AB SERPL-ACNC: 10.9 SEC — SIGNIFICANT CHANGE UP (ref 10.6–13.6)
RBC # BLD: 3.4 M/UL — LOW (ref 3.8–5.2)
RBC # FLD: 13.2 % — SIGNIFICANT CHANGE UP (ref 10.3–14.5)
SODIUM SERPL-SCNC: 141 MMOL/L — SIGNIFICANT CHANGE UP (ref 135–145)
URATE SERPL-MCNC: 7.2 MG/DL — HIGH (ref 2.5–7)
WBC # BLD: 6.43 K/UL — SIGNIFICANT CHANGE UP (ref 3.8–10.5)
WBC # FLD AUTO: 6.43 K/UL — SIGNIFICANT CHANGE UP (ref 3.8–10.5)

## 2020-08-15 PROCEDURE — 99232 SBSQ HOSP IP/OBS MODERATE 35: CPT | Mod: GC

## 2020-08-15 RX ORDER — HYDRALAZINE HCL 50 MG
25 TABLET ORAL ONCE
Refills: 0 | Status: COMPLETED | OUTPATIENT
Start: 2020-08-15 | End: 2020-08-15

## 2020-08-15 RX ORDER — ACETAMINOPHEN 500 MG
3 TABLET ORAL
Qty: 0 | Refills: 0 | DISCHARGE
Start: 2020-08-15

## 2020-08-15 RX ORDER — HYDRALAZINE HCL 50 MG
5 TABLET ORAL ONCE
Refills: 0 | Status: COMPLETED | OUTPATIENT
Start: 2020-08-15 | End: 2020-08-15

## 2020-08-15 RX ORDER — HYDRALAZINE HCL 50 MG
75 TABLET ORAL EVERY 8 HOURS
Refills: 0 | Status: DISCONTINUED | OUTPATIENT
Start: 2020-08-15 | End: 2020-08-15

## 2020-08-15 RX ORDER — HYDRALAZINE HCL 50 MG
75 TABLET ORAL EVERY 8 HOURS
Refills: 0 | Status: DISCONTINUED | OUTPATIENT
Start: 2020-08-15 | End: 2020-08-17

## 2020-08-15 RX ADMIN — Medication 50 MILLIGRAM(S): at 06:15

## 2020-08-15 RX ADMIN — Medication 975 MILLIGRAM(S): at 02:20

## 2020-08-15 RX ADMIN — Medication 50 MILLIGRAM(S): at 14:10

## 2020-08-15 RX ADMIN — Medication 25 MILLIGRAM(S): at 15:36

## 2020-08-15 RX ADMIN — Medication 75 MILLIGRAM(S): at 21:06

## 2020-08-15 RX ADMIN — Medication 975 MILLIGRAM(S): at 03:15

## 2020-08-15 RX ADMIN — Medication 5 MILLIGRAM(S): at 18:43

## 2020-08-15 RX ADMIN — HEPARIN SODIUM 5000 UNIT(S): 5000 INJECTION INTRAVENOUS; SUBCUTANEOUS at 21:06

## 2020-08-15 NOTE — PROGRESS NOTE ADULT - SUBJECTIVE AND OBJECTIVE BOX
R3 Antepartum Note, HD#4    Interval events:    Patient seen and examined at bedside, no acute overnight events. No acute complaints. Pt denies HA, epigastric pain, blurred vision, CP, SOB, N/V, fevers, and chills. Feeling much improved.    Vital Signs Last 24 Hours  T(C): 36.4 (08-15-20 @ 05:58), Max: 36.7 (08-14-20 @ 22:35)  HR: 48 (08-15-20 @ 05:58) (43 - 79)  BP: 147/85 (08-15-20 @ 05:58) (130/65 - 164/91)  RR: 17 (08-15-20 @ 05:58) (17 - 22)  SpO2: 98% (08-15-20 @ 05:58) (98% - 100%)    CAPILLARY BLOOD GLUCOSE          Physical Exam:  General: NAD  Abdomen: Soft, non-tender, gravid  Ext: No pain or swelling      Labs:             10.1   6.43  )-----------( 258      ( 08-15 @ 06:03 )             31.0     08-15 @ 06:03    141  |  106  |  13  ----------------------------<  71  3.6   |  21  |  0.63    Ca    8.8      08-15 @ 06:03    TPro  5.6  /  Alb  3.0  /  TBili  0.3  /  DBili  x   /  AST  22  /  ALT  93  /  AlkPhos  132  08-15 @ 06:03    PT/INR - ( 08-15 @ 06:03 )   PT: 10.9 SEC;   INR: 0.95     PTT - ( 08-15 @ 06:03 )  PTT:32.7 SEC    Uric Acid: (08-15 @ 06:03)  7.2      Fibrinogen: (08-15 @ 06:03)  497.0    LDH: (08-15 @ 06:03)  272        MEDICATIONS  (STANDING):  heparin   Injectable 5000 Unit(s) SubCutaneous every 12 hours  hydrALAZINE 50 milliGRAM(s) Oral every 8 hours    MEDICATIONS  (PRN):  acetaminophen   Tablet .. 975 milliGRAM(s) Oral every 6 hours PRN Severe Pain (7 - 10)

## 2020-08-15 NOTE — CHART NOTE - NSCHARTNOTEFT_GEN_A_CORE
R3 Chart Note:  (late entry due to clinical activity)    Patient noted to have elevated BPs at 2pm, prior to the scheduled hydralazine dose. One severe noted, which was not persistent. Patient received her hydralazine and then the repeat BP an hour later was severe again, however not persistent. Patient asymptomatic. The decision was made to increase the maintenance hydralazine dose at that time to 75 TID from 50 TID.     D/w Dr. Miguelito Mueller PGY-3

## 2020-08-15 NOTE — PROGRESS NOTE ADULT - ASSESSMENT
A/P: 28y/o  PPD#8 s/p , intrapartum course c/b gHTN, presented to the ED with severe range BPs, meeting criteria for sPEC. Hydral 5mg IVP administered in the ED. Patient found to have pulmonary edema, and is now s/p keppra for seizure ppx and s/p lasix for diuresis. She also had bradycardia on presentation, Cardiology consulted. Work-up initiated to r/o cardiomyopathy and PE. Ekg w/ sinus bradycardia. CTA w/o evidence of a PE and TTE w/ mild pulmonary HTN, EF 69%. Cardiac enzymes wnl. BNP elevated to 849, likely 2/2 to the pulmonary edema. SOB has resolved with diuresis. 1 severe range BP overnight: SBP 160s with subsequent pressures 140s. This morning HR as low as 27 and cardiology was notified. Repeat HR manually 44. Regarding her epigastric pain, RUQ sono was equivocal for acute cholecystitis, but did demonstrate gallstones. She had increased severe pain on exam this morning. LFTs elevated on admission, currently being trended: 75/111->171/71->108/192. AM LFTs pending. Afebrile overnight. Lyme disease panel wnl.    #sPEC  - f/u AM HELLP labs  - c/w Hydral 50 TID as per cards, due to bradycardia and concern for exacerbation of the HR  - F/u thyroid antibodies; recheck TSH x1mo (unclear accuracy per Cards)  - Cardiology following: appreciate recs    #Epigastric/RUQ pain  - HIDA scan neg   - pepcid administered for epigastric pain  - tylenol IV    #Postpartum  - Reg diet  - SCDs, HSQ, ambulation for DVT ppx    - appreciate Cardiology and Gen Surg input    ANDRY Montero PGY3

## 2020-08-15 NOTE — CHART NOTE - NSCHARTNOTEFT_GEN_A_CORE
R3 OB Chart Note    28y/o  PPD#8 s/p , intrapartum course c/b gHTN, presented to the ED with severe range BPs, meeting criteria for sPEC. Hydral 5mg IVP administered in the ED. Patient found to have pulmonary edema, and is now s/p keppra for seizure ppx and s/p lasix for diuresis. She also had bradycardia on presentation, Cardiology consulted. Work-up initiated to r/o cardiomyopathy and PE. Ekg w/ sinus bradycardia. CTA w/o evidence of a PE and TTE w/ mild pulmonary HTN, EF 69%. Cardiac enzymes wnl. BNP elevated to 849, likely 2/2 to the pulmonary edema. SOB has resolved with diuresis. 1 severe range BP overnight: SBP 160s with subsequent pressures 140s. This morning HR as low as 27 and cardiology was notified. Repeat HR manually 44. Regarding her epigastric pain, RUQ sono was equivocal for acute cholecystitis, but did demonstrate gallstones. She had increased severe pain on exam this morning. LFTs elevated on admission, currently being trended: 75/111->171/71->108/192. AM LFTs pending. Afebrile overnight. Lyme disease panel wnl.    Earlier today patient with nonconsecutive BPs. Hydral increased from 50 TID to 75 TID. Patient given her dose of 50 around 2pm then additional dose of 25 around 330pm. Patient seen and evaluated at bedside for consecutive BPs x2. She denies HA, blurry vision, RUQ pain, edema, shortness of breath.    ICU Vital Signs Last 24 Hrs  T(C): 37.1 (15 Aug 2020 18:22), Max: 37.1 (15 Aug 2020 18:22)  T(F): 98.8 (15 Aug 2020 18:22), Max: 98.8 (15 Aug 2020 18:22)  HR: 50 (15 Aug 2020 19:03) (45 - 62)  BP: 140/82 (15 Aug 2020 19:03) (130/65 - 173/89)  BP(mean): --  ABP: --  ABP(mean): --  RR: 12 (15 Aug 2020 18:22) (12 - 19)  SpO2: 100% (15 Aug 2020 18:22) (98% - 100%)    PE:  Gen: NAD  CardS: RRR  PUlm: CTAB  Abd: soft, non-tender  Ext: warm, well perfused    MEDICATIONS  (STANDING):  heparin   Injectable 5000 Unit(s) SubCutaneous every 12 hours  hydrALAZINE 75 milliGRAM(s) Oral every 8 hours    MEDICATIONS  (PRN):  acetaminophen   Tablet .. 975 milliGRAM(s) Oral every 6 hours PRN Severe Pain (7 - 10)    A/P: 28y/o  PPD#8 s/p , intrapartum course c/b gHTN, presented to the ED with severe range BPs, meeting criteria for sPEC. Hydral 5mg IVP administered in the ED. Patient found to have pulmonary edema, and is now s/p keppra for seizure ppx and s/p lasix for diuresis. She also had bradycardia on presentation, Cardiology consulted. Work-up initiated to r/o cardiomyopathy and PE. Ekg w/ sinus bradycardia. CTA w/o evidence of a PE and TTE w/ mild pulmonary HTN, EF 69%. Cardiac enzymes wnl. BNP elevated to 849, likely 2/2 to the pulmonary edema. SOB has resolved with diuresis. 1 severe range BP overnight: SBP 160s with subsequent pressures 140s. This morning HR as low as 27 and cardiology was notified. Repeat HR manually 44. Regarding her epigastric pain, RUQ sono was equivocal for acute cholecystitis, but did demonstrate gallstones. She had increased severe pain on exam this morning. LFTs elevated on admission, downtrending. Afebrile overnight. Lyme disease panel wnl. Earlier today patient with nonconsecutive BPs. Hydral increased from 50 TID to 75 TID. Patient given her dose of 50 around 2pm then additional dose of 25 around 330pm. Patient seen and evaluated at bedside for consecutive BPs x2. Asymptomatic. s/p Hydral 5 IVP with normal repeat. Labs this AM appropriate.    - c/w Hydral 75 TID, will give PM dose at 10pm  - AM HELLP labs  - continue to monitor BPs    C. Mikael PGY3  d/w Dr. Kessler

## 2020-08-16 DIAGNOSIS — R10.13 EPIGASTRIC PAIN: ICD-10-CM

## 2020-08-16 LAB
ALBUMIN SERPL ELPH-MCNC: 3.4 G/DL — SIGNIFICANT CHANGE UP (ref 3.3–5)
ALP SERPL-CCNC: 131 U/L — HIGH (ref 40–120)
ALT FLD-CCNC: 82 U/L — HIGH (ref 4–33)
ANION GAP SERPL CALC-SCNC: 15 MMO/L — HIGH (ref 7–14)
APTT BLD: 33 SEC — SIGNIFICANT CHANGE UP (ref 27–36.3)
AST SERPL-CCNC: 28 U/L — SIGNIFICANT CHANGE UP (ref 4–32)
BASOPHILS # BLD AUTO: 0.04 K/UL — SIGNIFICANT CHANGE UP (ref 0–0.2)
BASOPHILS NFR BLD AUTO: 0.6 % — SIGNIFICANT CHANGE UP (ref 0–2)
BILIRUB SERPL-MCNC: 0.4 MG/DL — SIGNIFICANT CHANGE UP (ref 0.2–1.2)
BUN SERPL-MCNC: 9 MG/DL — SIGNIFICANT CHANGE UP (ref 7–23)
CALCIUM SERPL-MCNC: 9.1 MG/DL — SIGNIFICANT CHANGE UP (ref 8.4–10.5)
CHLORIDE SERPL-SCNC: 104 MMOL/L — SIGNIFICANT CHANGE UP (ref 98–107)
CO2 SERPL-SCNC: 21 MMOL/L — LOW (ref 22–31)
CREAT SERPL-MCNC: 0.61 MG/DL — SIGNIFICANT CHANGE UP (ref 0.5–1.3)
EOSINOPHIL # BLD AUTO: 0.25 K/UL — SIGNIFICANT CHANGE UP (ref 0–0.5)
EOSINOPHIL NFR BLD AUTO: 3.5 % — SIGNIFICANT CHANGE UP (ref 0–6)
FIBRINOGEN PPP-MCNC: 483 MG/DL — SIGNIFICANT CHANGE UP (ref 290–520)
GLUCOSE SERPL-MCNC: 77 MG/DL — SIGNIFICANT CHANGE UP (ref 70–99)
HCT VFR BLD CALC: 32.2 % — LOW (ref 34.5–45)
HGB BLD-MCNC: 10.5 G/DL — LOW (ref 11.5–15.5)
IMM GRANULOCYTES NFR BLD AUTO: 0.7 % — SIGNIFICANT CHANGE UP (ref 0–1.5)
INR BLD: 0.99 — SIGNIFICANT CHANGE UP (ref 0.88–1.16)
LDH SERPL L TO P-CCNC: 279 U/L — HIGH (ref 135–225)
LYMPHOCYTES # BLD AUTO: 2.1 K/UL — SIGNIFICANT CHANGE UP (ref 1–3.3)
LYMPHOCYTES # BLD AUTO: 29.8 % — SIGNIFICANT CHANGE UP (ref 13–44)
MCHC RBC-ENTMCNC: 29.2 PG — SIGNIFICANT CHANGE UP (ref 27–34)
MCHC RBC-ENTMCNC: 32.6 % — SIGNIFICANT CHANGE UP (ref 32–36)
MCV RBC AUTO: 89.4 FL — SIGNIFICANT CHANGE UP (ref 80–100)
MONOCYTES # BLD AUTO: 0.47 K/UL — SIGNIFICANT CHANGE UP (ref 0–0.9)
MONOCYTES NFR BLD AUTO: 6.7 % — SIGNIFICANT CHANGE UP (ref 2–14)
NEUTROPHILS # BLD AUTO: 4.14 K/UL — SIGNIFICANT CHANGE UP (ref 1.8–7.4)
NEUTROPHILS NFR BLD AUTO: 58.7 % — SIGNIFICANT CHANGE UP (ref 43–77)
NRBC # FLD: 0 K/UL — SIGNIFICANT CHANGE UP (ref 0–0)
PLATELET # BLD AUTO: 277 K/UL — SIGNIFICANT CHANGE UP (ref 150–400)
PMV BLD: 11.1 FL — SIGNIFICANT CHANGE UP (ref 7–13)
POTASSIUM SERPL-MCNC: 3.8 MMOL/L — SIGNIFICANT CHANGE UP (ref 3.5–5.3)
POTASSIUM SERPL-SCNC: 3.8 MMOL/L — SIGNIFICANT CHANGE UP (ref 3.5–5.3)
PROT SERPL-MCNC: 6.3 G/DL — SIGNIFICANT CHANGE UP (ref 6–8.3)
PROTHROM AB SERPL-ACNC: 11.3 SEC — SIGNIFICANT CHANGE UP (ref 10.6–13.6)
RBC # BLD: 3.6 M/UL — LOW (ref 3.8–5.2)
RBC # FLD: 13.1 % — SIGNIFICANT CHANGE UP (ref 10.3–14.5)
SODIUM SERPL-SCNC: 140 MMOL/L — SIGNIFICANT CHANGE UP (ref 135–145)
URATE SERPL-MCNC: 7.2 MG/DL — HIGH (ref 2.5–7)
WBC # BLD: 7.05 K/UL — SIGNIFICANT CHANGE UP (ref 3.8–10.5)
WBC # FLD AUTO: 7.05 K/UL — SIGNIFICANT CHANGE UP (ref 3.8–10.5)

## 2020-08-16 PROCEDURE — 99233 SBSQ HOSP IP/OBS HIGH 50: CPT | Mod: GC

## 2020-08-16 RX ORDER — DIPHENHYDRAMINE HCL 50 MG
25 CAPSULE ORAL ONCE
Refills: 0 | Status: COMPLETED | OUTPATIENT
Start: 2020-08-16 | End: 2020-08-16

## 2020-08-16 RX ORDER — METOCLOPRAMIDE HCL 10 MG
10 TABLET ORAL ONCE
Refills: 0 | Status: COMPLETED | OUTPATIENT
Start: 2020-08-16 | End: 2020-08-16

## 2020-08-16 RX ORDER — METOCLOPRAMIDE HCL 10 MG
10 TABLET ORAL ONCE
Refills: 0 | Status: DISCONTINUED | OUTPATIENT
Start: 2020-08-16 | End: 2020-08-16

## 2020-08-16 RX ADMIN — Medication 75 MILLIGRAM(S): at 21:37

## 2020-08-16 RX ADMIN — Medication 975 MILLIGRAM(S): at 07:10

## 2020-08-16 RX ADMIN — Medication 75 MILLIGRAM(S): at 06:18

## 2020-08-16 RX ADMIN — HEPARIN SODIUM 5000 UNIT(S): 5000 INJECTION INTRAVENOUS; SUBCUTANEOUS at 21:38

## 2020-08-16 RX ADMIN — Medication 975 MILLIGRAM(S): at 00:50

## 2020-08-16 RX ADMIN — Medication 975 MILLIGRAM(S): at 12:44

## 2020-08-16 RX ADMIN — Medication 25 MILLIGRAM(S): at 12:38

## 2020-08-16 RX ADMIN — HEPARIN SODIUM 5000 UNIT(S): 5000 INJECTION INTRAVENOUS; SUBCUTANEOUS at 09:30

## 2020-08-16 RX ADMIN — Medication 975 MILLIGRAM(S): at 13:38

## 2020-08-16 RX ADMIN — Medication 75 MILLIGRAM(S): at 06:16

## 2020-08-16 RX ADMIN — Medication 75 MILLIGRAM(S): at 14:37

## 2020-08-16 RX ADMIN — Medication 975 MILLIGRAM(S): at 06:17

## 2020-08-16 RX ADMIN — Medication 10 MILLIGRAM(S): at 12:40

## 2020-08-16 NOTE — PROGRESS NOTE ADULT - SUBJECTIVE AND OBJECTIVE BOX
MFM Fellow    Patient seen at bedside w/ MFM attending Dr. Farley. Patient reports feeling 10/10 HA that started last night unrelieved by PO tylenol, frontal and in the back of her head in location, she denies visual disturbances, RUQ/epigastric pain, SOB, CP, palpitations, abdominal pain. She states she did not have this headache prior to when it started last night.    Vital Signs Last 24 Hrs  T(C): 36.6 (16 Aug 2020 09:39), Max: 37.1 (15 Aug 2020 18:22)  T(F): 97.9 (16 Aug 2020 09:39), Max: 98.8 (15 Aug 2020 18:22)  HR: 62 (16 Aug 2020 09:39) (45 - 62)  BP: 128/72 (16 Aug 2020 09:39) (128/72 - 173/89)  BP(mean): --  RR: 17 (16 Aug 2020 09:39) (12 - 18)  SpO2: 98% (16 Aug 2020 09:39) (98% - 100%)  GA: NAD, A+OX3                          10.5   7.05  )-----------( 277      ( 16 Aug 2020 06:10 )             32.2     08-16    140  |  104  |  9   ----------------------------<  77  3.8   |  21<L>  |  0.61    Ca    9.1      16 Aug 2020 06:10    TPro  6.3  /  Alb  3.4  /  TBili  0.4  /  DBili  x   /  AST  28  /  ALT  82<H>  /  AlkPhos  131<H>  08-16    PT/INR - ( 16 Aug 2020 06:10 )   PT: 11.3 SEC;   INR: 0.99          PTT - ( 16 Aug 2020 06:10 )  PTT:33.0 SEC    MEDICATIONS  (STANDING):  heparin   Injectable 5000 Unit(s) SubCutaneous every 12 hours  hydrALAZINE 75 milliGRAM(s) Oral every 8 hours    MEDICATIONS  (PRN):  acetaminophen   Tablet .. 975 milliGRAM(s) Oral every 6 hours PRN Severe Pain (7 - 10)

## 2020-08-16 NOTE — PROGRESS NOTE ADULT - SUBJECTIVE AND OBJECTIVE BOX
R3 Antepartum Note, HD#5    Interval events: Patient seen and examined at bedside, no acute overnight events. No acute complaints. Pt reports mild HA she attributes to poor sleep. Denies epigastric pain, blurred vision, CP, SOB, N/V, fevers, and chills.    Vital Signs Last 24 Hours  T(C): 36.4 (08-16-20 @ 06:07), Max: 37.1 (08-15-20 @ 18:22)  HR: 50 (08-16-20 @ 06:07) (45 - 54)  BP: 146/86 (08-16-20 @ 06:07) (140/82 - 173/89)  RR: 17 (08-16-20 @ 06:07) (12 - 18)  SpO2: 98% (08-16-20 @ 06:07) (98% - 100%)    CAPILLARY BLOOD GLUCOSE      Physical Exam:  General: NAD  Cards: RRR  Pulm: CTAB  Abdomen: Soft, non-tender  Ext: No pain or swelling    NST reactive overnight    Labs:             10.5   7.05  )-----------( 277      ( 08-16 @ 06:10 )             32.2     08-16 @ 06:10    140  |  104  |  9   ----------------------------<  77  3.8   |  21  |  0.61    Ca    9.1      08-16 @ 06:10    TPro  6.3  /  Alb  3.4  /  TBili  0.4  /  DBili  x   /  AST  28  /  ALT  82  /  AlkPhos  131  08-16 @ 06:10    PT/INR - ( 08-16 @ 06:10 )   PT: 11.3 SEC;   INR: 0.99     PTT - ( 08-16 @ 06:10 )  PTT:33.0 SEC    Uric Acid: (08-16 @ 06:10)  7.2      Fibrinogen: (08-16 @ 06:10)  483.0    LDH: (08-16 @ 06:10)  279        MEDICATIONS  (STANDING):  heparin   Injectable 5000 Unit(s) SubCutaneous every 12 hours  hydrALAZINE 75 milliGRAM(s) Oral every 8 hours    MEDICATIONS  (PRN):  acetaminophen   Tablet .. 975 milliGRAM(s) Oral every 6 hours PRN Severe Pain (7 - 10)

## 2020-08-16 NOTE — PROGRESS NOTE ADULT - ASSESSMENT
A/P 30 yo P2 PPD5 admitted for management of PEC w/ severe features, hemodynamically stable  - BP controlled on hydralazine 75mg TID, HELLP labs reviewed, LFTs downtrending, concern with new headache unrelieved by tylenol, will suggest adding reglan and IV benadryl for relief and plan for head imaging to determine if etiology may be related to PRES syndrome or sinus venous thrombosis  - she is s/p extensive evaluation after presentation with pulmonary edema and bradycardia, s/p cardiology consultation  - DVT prophylaxis w/ SQH

## 2020-08-16 NOTE — PROGRESS NOTE ADULT - PROBLEM SELECTOR PLAN 1
#sPEC  - f/u AM HELLP labs  - c/w Hydral 75 TID as per cards, due to bradycardia and concern for exacerbation of the HR  - F/u thyroid antibodies; recheck TSH x1mo (unclear accuracy per Cards)  - Cardiology following: appreciate recs    #Postpartum  - Reg diet  - SCDs, HSQ, ambulation for DVT ppx

## 2020-08-16 NOTE — PROGRESS NOTE ADULT - ASSESSMENT
A/P: 30y/o  PPD#9 s/p , intrapartum course c/b gHTN, presented to the ED with severe range BPs, meeting criteria for sPEC. Hydral 5mg IVP administered in the ED. Patient found to have pulmonary edema, and is now s/p keppra for seizure ppx and s/p lasix for diuresis. She also had bradycardia on presentation, Cardiology consulted. Work-up initiated to r/o cardiomyopathy and PE. Ekg w/ sinus bradycardia. CTA w/o evidence of a PE and TTE w/ mild pulmonary HTN, EF 69%. Cardiac enzymes wnl. BNP elevated to 849, likely 2/2 to the pulmonary edema. SOB has resolved with diuresis. Regarding her epigastric pain, RUQ sono was equivocal for acute cholecystitis, but did demonstrate gallstones. LFTs elevated on admission, downtrending. Afebrile overnight. Lyme disease panel wnl. Yesterday BP medication uptitrated to Hydral 75 TID.

## 2020-08-16 NOTE — PROGRESS NOTE ADULT - PROBLEM SELECTOR PLAN 2
#Epigastric/RUQ pain  - HIDA scan neg   - GenSurg following: no urgent surgical intervention  - Pepcid administered for epigastric pain  - Tylenol IV      C. Youssef PGY3

## 2020-08-17 ENCOUNTER — TRANSCRIPTION ENCOUNTER (OUTPATIENT)
Age: 30
End: 2020-08-17

## 2020-08-17 VITALS
SYSTOLIC BLOOD PRESSURE: 146 MMHG | HEART RATE: 54 BPM | DIASTOLIC BLOOD PRESSURE: 87 MMHG | OXYGEN SATURATION: 99 % | RESPIRATION RATE: 17 BRPM

## 2020-08-17 LAB
ALBUMIN SERPL ELPH-MCNC: 3.6 G/DL — SIGNIFICANT CHANGE UP (ref 3.3–5)
ALP SERPL-CCNC: 129 U/L — HIGH (ref 40–120)
ALT FLD-CCNC: 71 U/L — HIGH (ref 4–33)
ANION GAP SERPL CALC-SCNC: 13 MMO/L — SIGNIFICANT CHANGE UP (ref 7–14)
AST SERPL-CCNC: 27 U/L — SIGNIFICANT CHANGE UP (ref 4–32)
BILIRUB SERPL-MCNC: 0.4 MG/DL — SIGNIFICANT CHANGE UP (ref 0.2–1.2)
BUN SERPL-MCNC: 10 MG/DL — SIGNIFICANT CHANGE UP (ref 7–23)
CALCIUM SERPL-MCNC: 9.1 MG/DL — SIGNIFICANT CHANGE UP (ref 8.4–10.5)
CHLORIDE SERPL-SCNC: 105 MMOL/L — SIGNIFICANT CHANGE UP (ref 98–107)
CO2 SERPL-SCNC: 22 MMOL/L — SIGNIFICANT CHANGE UP (ref 22–31)
CREAT SERPL-MCNC: 0.63 MG/DL — SIGNIFICANT CHANGE UP (ref 0.5–1.3)
GLUCOSE SERPL-MCNC: 81 MG/DL — SIGNIFICANT CHANGE UP (ref 70–99)
POTASSIUM SERPL-MCNC: 4 MMOL/L — SIGNIFICANT CHANGE UP (ref 3.5–5.3)
POTASSIUM SERPL-SCNC: 4 MMOL/L — SIGNIFICANT CHANGE UP (ref 3.5–5.3)
PROT SERPL-MCNC: 6.5 G/DL — SIGNIFICANT CHANGE UP (ref 6–8.3)
SODIUM SERPL-SCNC: 140 MMOL/L — SIGNIFICANT CHANGE UP (ref 135–145)

## 2020-08-17 PROCEDURE — 99232 SBSQ HOSP IP/OBS MODERATE 35: CPT | Mod: GC

## 2020-08-17 PROCEDURE — 70551 MRI BRAIN STEM W/O DYE: CPT | Mod: 26

## 2020-08-17 PROCEDURE — 99238 HOSP IP/OBS DSCHRG MGMT 30/<: CPT

## 2020-08-17 PROCEDURE — 99231 SBSQ HOSP IP/OBS SF/LOW 25: CPT | Mod: GC

## 2020-08-17 RX ORDER — HYDRALAZINE HCL 50 MG
3 TABLET ORAL
Qty: 42 | Refills: 0
Start: 2020-08-17

## 2020-08-17 RX ADMIN — Medication 75 MILLIGRAM(S): at 06:25

## 2020-08-17 RX ADMIN — Medication 75 MILLIGRAM(S): at 14:06

## 2020-08-17 RX ADMIN — HEPARIN SODIUM 5000 UNIT(S): 5000 INJECTION INTRAVENOUS; SUBCUTANEOUS at 10:57

## 2020-08-17 NOTE — PROGRESS NOTE ADULT - SUBJECTIVE AND OBJECTIVE BOX
MFM Fellow    Patient seen at bedside. No acute complaints. Denies any HA, visual changes, CP/SOB, N/V. No epigastric pain. tolerating diet. Voiding freely. OOB as tolerated  Requesting to be discharged home      12 point ROS negative except as outlined above    Vital Signs Last 24 Hrs  T(C): 36.8 (17 Aug 2020 09:53), Max: 36.8 (17 Aug 2020 06:18)  T(F): 98.3 (17 Aug 2020 09:53), Max: 98.3 (17 Aug 2020 06:18)  HR: 69 (17 Aug 2020 09:53) (51 - 75)  BP: 137/83 (17 Aug 2020 09:53) (122/69 - 141/82)  RR: 17 (17 Aug 2020 09:53) (14 - 17)  SpO2: 99% (17 Aug 2020 09:53) (98% - 99%)    LABS:                        10.5   7.05  )-----------( 277      ( 16 Aug 2020 06:10 )             32.2     08-17    140  |  105  |  10  ----------------------------<  81  4.0   |  22  |  0.63    Ca    9.1      17 Aug 2020 08:00    TPro  6.5  /  Alb  3.6  /  TBili  0.4  /  DBili  x   /  AST  27  /  ALT  71<H>  /  AlkPhos  129<H>  08-17    PT/INR - ( 16 Aug 2020 06:10 )   PT: 11.3 SEC;   INR: 0.99          PTT - ( 16 Aug 2020 06:10 )  PTT:33.0 SEC      MRI: Negative   HIDA scan WNL

## 2020-08-17 NOTE — PROGRESS NOTE ADULT - ASSESSMENT
28 y/o female  s/p vaginal delivery 20, with no significant pmh who presented to the ED due to epigastric/chest pain, SOB, and bilateral lower extremity edema.     # Epigastric/chest pain, SOB, and LE edema  - CTA negative for PE.  - TTE showing grossly normal LV and RV systolic function. pt does not currently appear grossly volume overloaded  - EKG demonstrated sinus bradycardia with T wave inversions. tele reviewed.   -cont to monitor on tele, would hold off on avn blockers in the tx of her htn  - pt w/ asymptomatic bradycardia. May f/u w/i 2 wks of discharge for further assessment at [facility]    # HTN  - C/w Hydralazine 75 TID   - may continue outpatient    Reese Díaz MD  Cardiology fellow, F1  [phone]

## 2020-08-17 NOTE — CHART NOTE - NSCHARTNOTEFT_GEN_A_CORE
Attempted to receive preliminary read on MRI head x2.  Radiology unable to provide prelim read overnight.  Will follow up in AM.  Per nurse, patient sleeping comfortably and has not complained of anything throughout the night.    H Winston PGY3

## 2020-08-17 NOTE — PROGRESS NOTE ADULT - ASSESSMENT
28y/o  PPD#10 s/p , intrapartum course c/b gHTN, presented to the ED with severe range BPs, meeting criteria for sPEC. Hydral 5mg IVP administered in the ED. Patient found to have pulmonary edema, and is now s/p keppra for seizure ppx and s/p lasix for diuresis. She also had bradycardia on presentation, Cardiology consulted. Work-up initiated to r/o cardiomyopathy and PE. Ekg w/ sinus bradycardia. CTA w/o evidence of a PE and TTE w/ mild pulmonary HTN, EF 69%. Cardiac enzymes wnl. BNP elevated to 849, likely 2/2 to the pulmonary edema. Lyme disease panel wnl. SOB has resolved with diuresis. Regarding her epigastric pain, RUQ sono was equivocal for acute cholecystitis, but did demonstrate gallstones. HIDA scan negative for acute cholecystitis. LFTs elevated on admission, downtrending. BPs stable on Hydral 75 TID.    #sPEC  - HELLP labs have been wnl, except for LFTs, which have been decreasing. Will f/u AM CMP  - Continue Hydral 75 TID as per cards, due to bradycardia and concern for exacerbation of the HR  - TPO and TSI antibodies neg, f/u TSH antibodies; recheck TSH x1mo (unclear accuracy per Cards)  - Cardiology following: appreciate recs    #Postpartum  - Reg diet  - SCDs, HSQ, ambulation for DVT ppx      #Epigastric/RUQ pain  - HIDA scan neg   - GenSurg following: no urgent surgical intervention  - Pepcid prn for epigastric pain    Jaci Mueller PGY-3 30y/o  PPD#10 s/p , intrapartum course c/b gHTN, presented to the ED with severe range BPs, meeting criteria for sPEC. Hydral 5mg IVP administered in the ED. Patient found to have pulmonary edema, and is now s/p keppra for seizure ppx and s/p lasix for diuresis. She also had bradycardia on presentation, Cardiology consulted. Work-up initiated to r/o cardiomyopathy and PE. Ekg w/ sinus bradycardia. CTA w/o evidence of a PE and TTE w/ mild pulmonary HTN, EF 69%. Cardiac enzymes wnl. BNP elevated to 849, likely 2/2 to the pulmonary edema. Lyme disease panel wnl. SOB has resolved with diuresis. Regarding her epigastric pain, RUQ sono was equivocal for acute cholecystitis, but did demonstrate gallstones. HIDA scan negative for acute cholecystitis. LFTs elevated on admission, downtrending. BPs stable on Hydral 75 TID. Patient c/o increased headache yesterday, MRI head obtained to r/o PRES and read pending.    #sPEC  - HELLP labs have been wnl, except for LFTs, which have been decreasing. Will f/u AM CMP  - Continue Hydral 75 TID as per cards, due to bradycardia and concern for exacerbation of the HR  - TPO and TSI antibodies neg, f/u TSH antibodies; recheck TSH x1mo (unclear accuracy per Cards)  - Cardiology following: appreciate recs  - F/u read for MRI head performed     #Postpartum  - Reg diet  - SCDs, HSQ, ambulation for DVT ppx      #Epigastric/RUQ pain  - HIDA scan neg   - GenSurg following: no urgent surgical intervention  - Pepcid prn for epigastric pain    Jaci Mueller PGY-3

## 2020-08-17 NOTE — PROGRESS NOTE ADULT - SUBJECTIVE AND OBJECTIVE BOX
[facility] SOTOMAY[facility] Progress Note: HD#6 PPD#10    Interval events:  Yesterday the patient complained of a persistent headache. An MRI head was obtained overnight, radiology to read this AM. Today, the patient states her headache is improved. Her headache was "all over" yesterday. This morning, she states she feels a mild headache in the left occipital region. No vision changes. No n/v. She is no longer on telemetry.    Patient seen and examined at bedside. Patient is ambulating, tolerating PO, voiding spontaneously. Denies epigastric pain, CP, SOB, fevers, and chills.    Vital Signs Last 24 Hours  T(C): 36.8 (08-17-20 @ 06:18), Max: 36.8 (08-17-20 @ 06:18)  HR: 58 (08-17-20 @ 06:18) (51 - 75)  BP: 141/82 (08-17-20 @ 06:18) (122/69 - 141/82)  RR: 17 (08-17-20 @ 06:18) (14 - 17)  SpO2: 99% (08-17-20 @ 06:18) (98% - 99%)        Physical Exam:  General: NAD  Abdomen: Soft, non-tender, ND, firm uterine fundus  : lochia wnl  Ext: No pain or swelling        Labs:             10.5   7.05  )-----------( 277      ( 08-16 @ 06:10 )             32.2     08-16 @ 06:10    140  |  104  |  9   ----------------------------<  77  3.8   |  21  |  0.61    Ca    9.1      08-16 @ 06:10    TPro  6.3  /  Alb  3.4  /  TBili  0.4  /  DBili  x   /  AST  28  /  ALT  82  /  AlkPhos  131  08-16 @ 06:10    PT/INR - ( 08-16 @ 06:10 )   PT: 11.3 SEC;   INR: 0.99     PTT - ( 08-16 @ 06:10 )  PTT:33.0 SEC    Uric Acid: (08-16 @ 06:10)  7.2      Fibrinogen: (08-16 @ 06:10)  483.0    LDH: (08-16 @ 06:10)  279        MEDICATIONS  (STANDING):  heparin   Injectable 5000 Unit(s) SubCutaneous every 12 hours  hydrALAZINE 75 milliGRAM(s) Oral every 8 hours    MEDICATIONS  (PRN):  acetaminophen   Tablet .. 975 milliGRAM(s) Oral every 6 hours PRN Severe Pain (7 - 10)    Radiology:   MRI read pending

## 2020-08-17 NOTE — PROGRESS NOTE ADULT - REASON FOR ADMISSION
Preeclampsia

## 2020-08-17 NOTE — DISCHARGE NOTE NURSING/CASE MANAGEMENT/SOCIAL WORK - PATIENT PORTAL LINK FT
You can access the FollowMyHealth Patient Portal offered by [facility] by registering at the following website: [email or web address]. By joining [facility]’s FollowMyHealth portal, you will also be able to view your health information using other applications (apps) compatible with our system.

## 2020-08-17 NOTE — PROGRESS NOTE ADULT - ATTENDING COMMENTS
MFM attg- Bps improved and planned to send home but pt reports new severe HA overnight.  Plan for MRI  Spoke with pts sister who is an RN.
Patient is stable this morning.  Reports significant improvement of all symptoms.  Denies SOB, HA this morning.  OOB without concerns or dyspnea.  Bradycardia of unknown etiology, but appears to be resolving.  Appreciate cardiology input.  Had a RUQ US which was consistent with cholelithiasis, but equivocal for cholecystitis.  At this time, no clinical symptoms of cholecystitis.  Plan to observe and if concerns evolve, will call surgery.  Continue current inpatient observation and management and if remains stable will consider discharge to home tomorrow
The patient was seen and examined with the cardiology consultation team.    She is a 29-year-old woman s/p vaginal delivery of a healthy baby boy who presented with chest pain, dyspnea and bilateral lower extremity edema, found to have severe hypertension concerning for preecclampsia. The patient was started on hydralazine with improvement of her blood pressure. She is planning to be discharged today on her current medications.    I agree that the patient can be followed up in cardiology clinic for reassessment of her blood pressure. If it remains high, we can consider using a different agent than hydralazine, which requires TID dosing and has a bad side-effect profile. If the patient is breastfeeding, labetalol can be considered.    Abdulaziz Moss MD  Cardiology  [phone]
Personally saw and examined patient  labs and vitals reviewed  agree with assessment and plan above.   symptoms improved s/p lasix, can cont low dose while she is here  cont w/u of GI process per primary team  cont hydralazine for bp control  cont tele monitoring for bradycardia
Would need cardiology input for bradycardia.  Recurrent RUQ/epigastric pain likely related to cholelithiasis.  Appreciate surgery input.  Plan to continue current inpatient care and will consider discharge to home tomorrow if remains stable and does not require surgical intervention for cholelithiases.

## 2020-08-17 NOTE — PROGRESS NOTE ADULT - SUBJECTIVE AND OBJECTIVE BOX
INTERVAL EVENTS: no o/n events. Patient reports feeling well.     PAST MEDICAL & SURGICAL HISTORY:  No pertinent past medical history  No significant past surgical history      MEDICATIONS  (STANDING):  heparin   Injectable 5000 Unit(s) SubCutaneous every 12 hours  hydrALAZINE 75 milliGRAM(s) Oral every 8 hours    MEDICATIONS  (PRN):  acetaminophen   Tablet .. 975 milliGRAM(s) Oral every 6 hours PRN Severe Pain (7 - 10)      Vital Signs Last 24 Hrs  T(C): 36.4 (17 Aug 2020 14:03), Max: 36.8 (17 Aug 2020 06:18)  T(F): 97.5 (17 Aug 2020 14:03), Max: 98.3 (17 Aug 2020 06:18)  HR: 54 (17 Aug 2020 15:06) (52 - 75)  BP: 146/87 (17 Aug 2020 15:06) (122/69 - 148/95)  BP(mean): --  RR: 17 (17 Aug 2020 15:06) (14 - 17)  SpO2: 99% (17 Aug 2020 15:06) (98% - 99%)     PHYSICAL EXAM:  HEENT:   Normal oral mucosa, PERRL, EOMI	  Lymphatic: No lymphadenopathy  Cardiovascular: Normal S1 S2, No JVD, No murmurs, No edema  Respiratory: Lungs clear to auscultation	  Psychiatry: A & O x 3, Mood & affect appropriate  Gastrointestinal:  Soft, mildly-tender to palpation RUQ without rebound or guarding, + BS	  Skin: No rashes, No ecchymoses, No cyanosis	  Neurologic: Non-focal  Extremities: Normal range of motion, No clubbing, cyanosis     LABS:                        10.5   7.05  )-----------( 277      ( 16 Aug 2020 06:10 )             32.2     08-17    140  |  105  |  10  ----------------------------<  81  4.0   |  22  |  0.63    Ca    9.1      17 Aug 2020 08:00    TPro  6.5  /  Alb  3.6  /  TBili  0.4  /  DBili  x   /  AST  27  /  ALT  71<H>  /  AlkPhos  129<H>  08-17        PT/INR - ( 16 Aug 2020 06:10 )   PT: 11.3 SEC;   INR: 0.99          PTT - ( 16 Aug 2020 06:10 )  PTT:33.0 SEC    I&O's Summary    BNP  RADIOLOGY & ADDITIONAL STUDIES:    TELEMETRY: reviewed    EKG: reviewed

## 2020-08-17 NOTE — DISCHARGE NOTE NURSING/CASE MANAGEMENT/SOCIAL WORK - NSDCFUADDAPPT_GEN_ALL_CORE_FT
- Continue BP meds as prescribed (hold is BP is under 110/60)  -Take blood pressure with at home cuff prior to taking medications; if BP is >150/90 call MD  - Return to hospital with headaches, visual changes, abdominal pain, nausea, vomiting, chest pain or shortness of breathe  - Home care to be scheduled for a nurse to perform a BP check at home in 2-3 days  - Follow up with OB at [place] in one week for BP check  - Follow up with Cardiology in one week for bradycardia

## 2020-08-18 LAB — TSH RECEP AB FLD-ACNC: <1.1 IU/L — SIGNIFICANT CHANGE UP (ref 0–1.75)

## 2020-08-25 ENCOUNTER — APPOINTMENT (OUTPATIENT)
Dept: CARDIOLOGY | Facility: CLINIC | Age: 30
End: 2020-08-25
Payer: MEDICAID

## 2020-08-25 ENCOUNTER — NON-APPOINTMENT (OUTPATIENT)
Age: 30
End: 2020-08-25

## 2020-08-25 VITALS
WEIGHT: 152 LBS | HEART RATE: 65 BPM | SYSTOLIC BLOOD PRESSURE: 133 MMHG | HEIGHT: 62 IN | OXYGEN SATURATION: 99 % | DIASTOLIC BLOOD PRESSURE: 88 MMHG | BODY MASS INDEX: 27.97 KG/M2

## 2020-08-25 VITALS — SYSTOLIC BLOOD PRESSURE: 130 MMHG | DIASTOLIC BLOOD PRESSURE: 82 MMHG

## 2020-08-25 PROCEDURE — 99214 OFFICE O/P EST MOD 30 MIN: CPT

## 2020-08-25 PROCEDURE — 93000 ELECTROCARDIOGRAM COMPLETE: CPT

## 2020-08-25 NOTE — DISCUSSION/SUMMARY
[FreeTextEntry1] : The patient is a 29-year-old Sung speaking female postpartum preeclampsia who is feeling better. \par #1 Htn- on hydralazine 75mg tid, echo normal\par #2 Bradycardia- normal for her age\par #3 Cholelithiasis- pain improved. \par #4 General- Increase hydration, avoid salt, rest as much as possible.\par \par  phone used.

## 2020-08-25 NOTE — PHYSICAL EXAM
[General Appearance - Well Developed] : well developed [Normal Appearance] : normal appearance [Well Groomed] : well groomed [General Appearance - Well Nourished] : well nourished [General Appearance - In No Acute Distress] : no acute distress [No Deformities] : no deformities [Normal Conjunctiva] : the conjunctiva exhibited no abnormalities [Eyelids - No Xanthelasma] : the eyelids demonstrated no xanthelasmas [Normal Oral Mucosa] : normal oral mucosa [No Oral Pallor] : no oral pallor [No Oral Cyanosis] : no oral cyanosis [Normal Jugular Venous A Waves Present] : normal jugular venous A waves present [Normal Jugular Venous V Waves Present] : normal jugular venous V waves present [No Jugular Venous Singleton A Waves] : no jugular venous singleton A waves [Heart Rate And Rhythm] : heart rate and rhythm were normal [Heart Sounds] : normal S1 and S2 [Murmurs] : no murmurs present [Respiration, Rhythm And Depth] : normal respiratory rhythm and effort [Exaggerated Use Of Accessory Muscles For Inspiration] : no accessory muscle use [Auscultation Breath Sounds / Voice Sounds] : lungs were clear to auscultation bilaterally [Abdomen Soft] : soft [Abdomen Tenderness] : non-tender [Abdomen Mass (___ Cm)] : no abdominal mass palpated [Gait - Sufficient For Exercise Testing] : the gait was sufficient for exercise testing [Abnormal Walk] : normal gait [Cyanosis, Localized] : no localized cyanosis [Nail Clubbing] : no clubbing of the fingernails [Petechial Hemorrhages (___cm)] : no petechial hemorrhages [Skin Color & Pigmentation] : normal skin color and pigmentation [] : no rash [No Venous Stasis] : no venous stasis [No Skin Ulcers] : no skin ulcer [Skin Lesions] : no skin lesions [No Xanthoma] : no  xanthoma was observed [Oriented To Time, Place, And Person] : oriented to person, place, and time [Affect] : the affect was normal [Mood] : the mood was normal [No Anxiety] : not feeling anxious

## 2020-08-25 NOTE — HISTORY OF PRESENT ILLNESS
[FreeTextEntry1] : Jerrica is a 29-year-old nonENglish speaking female s/p delivery 40+ weeks on August 7th second pregnancy. She went home and came back the following day not feeling well. CTA neg. Started on hydralazine secondary to bradycardia. Now feels well. \par \par Interpretor phone used.

## 2020-09-10 ENCOUNTER — APPOINTMENT (OUTPATIENT)
Dept: CARDIOLOGY | Facility: CLINIC | Age: 30
End: 2020-09-10

## 2020-09-18 ENCOUNTER — OUTPATIENT (OUTPATIENT)
Dept: OUTPATIENT SERVICES | Facility: HOSPITAL | Age: 30
LOS: 1 days | End: 2020-09-18
Payer: MEDICAID

## 2020-09-18 ENCOUNTER — APPOINTMENT (OUTPATIENT)
Dept: OBGYN | Facility: CLINIC | Age: 30
End: 2020-09-18
Payer: MEDICAID

## 2020-09-18 ENCOUNTER — NON-APPOINTMENT (OUTPATIENT)
Age: 30
End: 2020-09-18

## 2020-09-18 VITALS
HEIGHT: 62 IN | TEMPERATURE: 97.8 F | OXYGEN SATURATION: 99 % | WEIGHT: 155 LBS | BODY MASS INDEX: 28.52 KG/M2 | HEART RATE: 64 BPM | SYSTOLIC BLOOD PRESSURE: 108 MMHG | DIASTOLIC BLOOD PRESSURE: 75 MMHG

## 2020-09-18 DIAGNOSIS — Z34.00 ENCOUNTER FOR SUPERVISION OF NORMAL FIRST PREGNANCY, UNSPECIFIED TRIMESTER: ICD-10-CM

## 2020-09-18 PROCEDURE — 99213 OFFICE O/P EST LOW 20 MIN: CPT | Mod: TH

## 2020-09-18 PROCEDURE — G0463: CPT

## 2020-09-18 NOTE — HISTORY OF PRESENT ILLNESS
[Postpartum Follow Up] : postpartum follow up [Last Pap Date: ___] : Last Pap Date: [unfilled] [Delivery Date: ___] : on [unfilled] [] : delivered by vaginal delivery [Male] : Delivery History: baby boy [Wt. ___] : weighing [unfilled] [Breastfeeding] : currently nursing [Discharge HCT: ___] : hematocrit level was [unfilled] [Discharge HGB: ___] : hemoglobin level was [unfilled] [Complications:___] : no complications [Rhogam] : Rhogam was not administered [Rubella Vaccine] : Rubella vaccine was not administered [Pertussis Vaccine] : Pertussis vaccine was not administered [BTL] : no tubal ligation [BF with Difficulty] : nursing without difficulty [Resumed Menses] : has not resumed her menses [Resumed [place]] : has not resumed intercourse [Intended Contraception] : the patient does not intended to use contraception postpartum [Examination Of The Breasts] : breasts are normal [Awake] : awake [Alert] : not alert [Acute Distress] : no acute distress [Mass] : no breast mass [Nipple Discharge] : no nipple discharge [Soft] : soft [Tender] : non tender [Distended] : not distended [Oriented x3] : oriented to person, place, and time [Depressed Mood] : not depressed [Flat Affect] : affect not flat [Labia Majora] : labia major [Labia Minora] : labia minora [Normal] : clitoris [Discharge] : had no discharge [Pap Obtained] : a Pap smear was not performed [Doing Well] : is doing well [No Sign of Infection] : is showing no signs of infection [None] : None [FreeTextEntry8] : EPDS~7 / (+) Bottlefeeding [de-identified] : Postpartum Exam [de-identified] : RTO 6 months for annual Gyn exam

## 2020-09-22 DIAGNOSIS — R00.1 BRADYCARDIA, UNSPECIFIED: ICD-10-CM

## 2020-09-22 DIAGNOSIS — K80.20 CALCULUS OF GALLBLADDER WITHOUT CHOLECYSTITIS WITHOUT OBSTRUCTION: ICD-10-CM

## 2020-09-22 DIAGNOSIS — Z30.09 ENCOUNTER FOR OTHER GENERAL COUNSELING AND ADVICE ON CONTRACEPTION: ICD-10-CM

## 2020-10-09 NOTE — PROGRESS NOTE ADULT - PROBLEM SELECTOR PLAN 1
Patient feeling well, BP well controlled on new medication of hydralazine 75mg TID  No further complaints- HA and epigastric pain has resolved  Bloodwork with resolving transaminitis   s/p cardiology consultation for bradycardia - will confirm if any additional work up is needed   Patient to have short interval follow up in clinic for BP check     Seen with Dr. Arianna Muro MD [id number] Exp Problem Focused - Mod. Complex

## 2020-10-12 ENCOUNTER — APPOINTMENT (OUTPATIENT)
Dept: ORTHOPEDIC SURGERY | Facility: CLINIC | Age: 30
End: 2020-10-12
Payer: MEDICAID

## 2020-10-12 VITALS
BODY MASS INDEX: 28.89 KG/M2 | HEIGHT: 62 IN | SYSTOLIC BLOOD PRESSURE: 110 MMHG | WEIGHT: 157 LBS | OXYGEN SATURATION: 98 % | HEART RATE: 78 BPM | DIASTOLIC BLOOD PRESSURE: 75 MMHG

## 2020-10-12 PROCEDURE — 72100 X-RAY EXAM L-S SPINE 2/3 VWS: CPT

## 2020-10-12 PROCEDURE — 99204 OFFICE O/P NEW MOD 45 MIN: CPT

## 2020-10-12 NOTE — PHYSICAL EXAM
[de-identified] : Constitutional: Well-nourished, well-developed, No acute distress\par Respiratory: Good respiratory effort, no SOB\par Lymphatic: No regional lymphadenopathy, no lymphedema\par Psychiatric: Pleasant and normal affect, alert and oriented x3\par Skin: Clean dry and intact B/L UE/LE\par Musculoskeletal: normal except where as noted in regional exam\par \par LUMBAR SPINE:\par Inspection reveals no swelling or erythema\par Range of motion testing shows painful flexion and extension\par Facet loading maneuver painful\par Pos tenderness to palpation of right paraspinal muscles\par Straight leg raise pos for pain on right\par \par HIPS/PELVIS -\par Symmetric in standing and lying\par Hip maneuvers:\par Range of motion shows painful full flexion \par passive hip impingement sign neg\par ROSEMARY pos for back pain\par Sacroiliac maneuvers: TTP of right]PSIS \par Alejandro's positive\par Resisted active straight leg raise pos\par TTP right buttock, NTTP greater trochanters, IT band \par \par NEURO - Normal bulk and tone \par LE strength - 5/5 including hip flexion, knee flexion, knee extension, ankle dorsiflexion, ankle plantarflexion\par Sensation - intact to light touch in bilateral lower extremities. \par LE Reflexes - intact patellar and Achilles reflexes bilaterally \par No Clonus\par Coordination was age appropriate and intact in all 4 limbs. \par GAIT - Normal base, normal stride length, non-antalgic \par FUNCTIONAL - Stands from seated position with pain\par   [de-identified] : The following radiographs were ordered and read by me during this patient's visit. I reviewed each radiograph in detail with the patient and discussed the findings as highlighted below. \par \par 2 views of the lumbar spine were obtained today that show no acute fracture or degenerative changes to her vertebrae.\par

## 2020-10-12 NOTE — HISTORY OF PRESENT ILLNESS
[de-identified] : Jerrica is a 29 year F who presents with low back pain radiating down her right leg. Her pain started about 7-8 months ago, but since giving birth about 3 weeks ago, her pain is worse. The pain is constant, worse with all movements especially standing up. She does not recall any injury or trauma prior to the pain starting. She has not taken any medication or used ice or heat to treat.   \par Denies bowel/bladder changes, fevers, chills, saddle anesthesia.  Denies numbness, tingling, weakness of the lower extremities.    \par

## 2020-10-12 NOTE — DISCUSSION/SUMMARY
[de-identified] : Diagnosis discussed with patient. She has an acute lumbar radiculopathy that is the cause of her radiating right lower extremity pain. Different treatment options were discussed with the patient and we collaboratively decided to start a treatment plan that includes:\par \par 1. Prednisone 40mg for 5 days for her radicular pain, patient made aware that although low amounts of this medication may be found in breast milk, she is to avoid breast feeding while taking this medicaiton\par 2. Physical therapy course for lumbar radiculopathy\par \par Pt was agreeable to this plan, and she will return in 6 weeks after her physical therapy sessions for reevaluation of her pain.\par \par I, Talat Perla MD, PGY-3, assisted with the history and documentation for Dr. Hadley on this date 10/12/2020 \par \par Ashleigh Hadley MD, EdM\par Sports Medicine PM[facility]R\par Department of Orthopedics\par

## 2020-10-15 NOTE — CONSULT NOTE ADULT - CONSULT REASON
Hello Tito  Your urine test is showing more protein than it has in the past.  This can be from your diabetes affecting your kidneys.   Please schedule a kidney ultrasound at [facility] ([phone]) formerly called [facility] along with a nonfasting lab only appointment to look at your kidney function and follow up with Dr. Lyles a few days after the ultrasound.   Please call or MyChart my office with any questions or concerns.    Faith Camacho, PAC       Chest pain, SOB, LE edema

## 2020-11-03 ENCOUNTER — RX RENEWAL (OUTPATIENT)
Age: 30
End: 2020-11-03

## 2020-11-25 ENCOUNTER — APPOINTMENT (OUTPATIENT)
Dept: ORTHOPEDIC SURGERY | Facility: CLINIC | Age: 30
End: 2020-11-25

## 2020-12-03 ENCOUNTER — APPOINTMENT (OUTPATIENT)
Dept: ORTHOPEDIC SURGERY | Facility: CLINIC | Age: 30
End: 2020-12-03
Payer: MEDICAID

## 2020-12-03 PROCEDURE — 99072 ADDL SUPL MATRL&STAF TM PHE: CPT

## 2020-12-03 PROCEDURE — 99214 OFFICE O/P EST MOD 30 MIN: CPT

## 2020-12-14 ENCOUNTER — APPOINTMENT (OUTPATIENT)
Dept: PHYSICAL MEDICINE AND REHAB | Facility: CLINIC | Age: 30
End: 2020-12-14
Payer: MEDICAID

## 2020-12-14 VITALS — SYSTOLIC BLOOD PRESSURE: 120 MMHG | DIASTOLIC BLOOD PRESSURE: 80 MMHG | TEMPERATURE: 97.1 F

## 2020-12-14 PROCEDURE — 99215 OFFICE O/P EST HI 40 MIN: CPT

## 2020-12-14 PROCEDURE — 99072 ADDL SUPL MATRL&STAF TM PHE: CPT

## 2020-12-14 NOTE — PHYSICAL EXAM
[FreeTextEntry1] : Gen: NAD\par HEENT: neck supple\par CV: no cyanosis\par Pulm: breathing well on room air\par Abd: soft, non-distended, mild tenderness in RLQ, neg christine, no rebound, no guarding\par Low back: range of motion limited by pain, tenderness to palpation lower lumbar paraspinals, +seated straight leg raise, neg FABERE, neg FAIR\par Msk: \par 5/5 hip flexion B/L, 5/5 knee extension B/L, 5/5 knee flexion B/L, 5/5 dorsiflexion B/L, 5/5 EHL B/L, 5/5 plantar flexion B/L\par 5/5 shoulder abduction B/L, 5/5 elbow flexion B/L, 5/5 elbow extension B/L, 5/5 wrist extension B/L, 5/5 hand  B/L\par Neuro: sensation intact to light touch in bilateral upper and lower extremities, reflexes 2+ brachioradialis, biceps, triceps bilaterally, reflexes 2+ patella, medial hamstring, achilles bilaterally, negative babinski, negative espinosa\par

## 2020-12-14 NOTE — HISTORY OF PRESENT ILLNESS
[FreeTextEntry1] : Translation services (Italian) used for this history and physical examination.\par 31 yo F with PMH cholelithiasis, pre-eclampsia who presents with low back pain.\par \par Onset: One year with progression over the last month.  No inciting events, trauma, or falls.\par Location: lower lumbar spine\par Characteristics: sharp \par Aggravating factors: prolonged sitting, standing, walking\par Alleviating factors: rest\par Radiation: right lower extremity to the right foot.\par Treatments: tylenol, prednisone, mobic with some temporary relief.  No previous physical therapy/HEP as patient with a new infant and has not been able to participate in PT.  \par Severity: 7-9/10\par \par Of note, patient with new onset right lower quadrant abdominal pain.  Pain started 2 weeks ago.  No association with PO intake.  Denies nausea, vomiting, diarrhea, or constipation.  No LUTS.  Denies vaginal bleeding or discharge.  Patient with a history of gallstones but pain at that time was in the RUQ.  \par \par Of note, patient recently gave birth to her son in Aug 2020.  Patient is not breastfeeding.\par \par Diagnostic studies:\par No previous MRI\par No previous EMG/NCS\par \par Patient denies new weakness, numbness or paresthesia.  Patient denies bowel/bladder dysfunction, fevers, chills, weight loss, night pain, or night sweats.\par

## 2020-12-14 NOTE — ASSESSMENT
[FreeTextEntry1] : 29 yo F who presents with low back pain with radiation into her right lower extremity consistent with lumbar radiculopathy, lumbosacral stenosis, and lumbar disc herniation.\par \par -MRI lumbar spine w/o contrast ordered\par -Referred patient for PT/HEP.  Patient has been unable to participate in PT in the past due to increasing home demands following the birth of her son.\par -Start tizanidine 2 mg PO qHS prn pain\par -Start gabapentin 300 mg PO qdaily x 3 days, thereafter 300 mg PO TID, dispense 90 tablets.  Patient denies a history of chronic kidney disease.\par -Stop mobic and PO NSAIDs given onset of abdominal pain.\par -Referred patient to GI and PCP for new onset abdominal pain.  Patient already has a gynecologist and I recommend following up with her.\par -Patient asked to seek immediate medical attention should abdominal pain worsen or if she develops fevers, chills, LUTS, nausea, vomiting, or vaginal bleeding.  Red flag signs and symptoms were reviewed with patient and she is asked to seek immediate medical attention should they arise.\par \par Ismael Ivan MD\par Spine and Sports Medicine\par \par [employer]\par At [facility]\par \par

## 2021-01-05 ENCOUNTER — APPOINTMENT (OUTPATIENT)
Dept: PHYSICAL MEDICINE AND REHAB | Facility: CLINIC | Age: 31
End: 2021-01-05
Payer: MEDICAID

## 2021-01-05 PROCEDURE — 99214 OFFICE O/P EST MOD 30 MIN: CPT

## 2021-01-05 PROCEDURE — 99072 ADDL SUPL MATRL&STAF TM PHE: CPT

## 2021-01-05 NOTE — ASSESSMENT
[FreeTextEntry1] : 31 yo F who presents with low back pain with radiation into her right lower extremity consistent with lumbar radiculopathy, lumbosacral stenosis, and lumbar disc herniation.\par \par -MRI lumbar spine w/o contrast ordered previously.  Patient with approval for MRI.  I spoke with Radiology and she is scheduled for an appointment for MRI on 1/8/21 at 12:15 AM at [facility] ([address] [place] [address])\par -Referred patient for PT/HEP.  Patient has been unable to participate in PT in the past due to increasing home demands following the birth of her son.\par -Increase gabapentin 300 mg PO TID to QID, dispense 90 tablets.  Patient denies a history of chronic kidney disease.\par -Referred patient to GI and PCP for new onset abdominal pain.  Patient already has a gynecologist and I recommend following up with her.\par -Patient asked to seek immediate medical attention should abdominal pain worsen or if she develops fevers, chills, LUTS, nausea, vomiting, or vaginal bleeding.  Red flag signs and symptoms were reviewed with patient and she is asked to seek immediate medical attention should they arise.\par -RTC following MRI to review results\par \par Ismael Ivan MD\par Spine and Sports Medicine\par \par [employer]\par At [facility]\par \par

## 2021-01-05 NOTE — HISTORY OF PRESENT ILLNESS
[FreeTextEntry1] : Translation services (Telugu) used for this history and physical examination.\par 1/5/21\par 31 yo F who presents for follow up with low back pain.  Patient last seen on 12/14/20 and started on gabapentin and tizanidine and MRI lumbar spine w/o contrast was ordered.  Patient is taking gabapentin 300 mg PO TID with no improvement.  Tizanidine was denied by her insurance company.  Patient has gotten approval for MRI but has not yet scheduled an appointment for imaging.  Patient denies new weakness, numbness or paresthesia.  Denies bowel/bladder dysfunction, fevers, chills, weight loss, night pain, or night sweats.\par \par 12/14/20\par 31 yo F with PMH cholelithiasis, pre-eclampsia who presents with low back pain.\par \par Onset: One year with progression over the last month.  No inciting events, trauma, or falls.\par Location: lower lumbar spine\par Characteristics: sharp \par Aggravating factors: prolonged sitting, standing, walking\par Alleviating factors: rest\par Radiation: right lower extremity to the right foot.\par Treatments: tylenol, prednisone, mobic with some temporary relief.  No previous physical therapy/HEP as patient with a new infant and has not been able to participate in PT.  \par Severity: 7-9/10\par \par Of note, patient with new onset right lower quadrant abdominal pain.  Pain started 2 weeks ago.  No association with PO intake.  Denies nausea, vomiting, diarrhea, or constipation.  No LUTS.  Denies vaginal bleeding or discharge.  Patient with a history of gallstones but pain at that time was in the RUQ.  \par \par Of note, patient recently gave birth to her son in Aug 2020.  Patient is not breastfeeding.\par \par Diagnostic studies:\par No previous MRI\par No previous EMG/NCS\par \par Patient denies new weakness, numbness or paresthesia.  Patient denies bowel/bladder dysfunction, fevers, chills, weight loss, night pain, or night sweats.\par

## 2021-01-05 NOTE — PHYSICAL EXAM
[FreeTextEntry1] : Gen: NAD\par HEENT: neck supple\par CV: no cyanosis\par Pulm: breathing well on room air\par Abd: soft, non-distended, mild tenderness in RLQ, neg christine, no rebound, no guarding\par Low back: range of motion limited by pain, tenderness to palpation lower lumbar paraspinals and right sciatic notch, +seated straight leg raise, neg FABERE, neg FAIR\par Msk: \par 5/5 hip flexion B/L, 5/5 knee extension B/L, 5/5 knee flexion B/L, 5/5 dorsiflexion B/L, 5/5 EHL B/L, 5/5 plantar flexion B/L\par 5/5 shoulder abduction B/L, 5/5 elbow flexion B/L, 5/5 elbow extension B/L, 5/5 wrist extension B/L, 5/5 hand  B/L\par Neuro: sensation intact to light touch in bilateral upper and lower extremities, reflexes 2+ brachioradialis, biceps, triceps bilaterally, reflexes 2+ patella, medial hamstring, achilles bilaterally, negative babinski, negative espinosa\par

## 2021-01-08 ENCOUNTER — APPOINTMENT (OUTPATIENT)
Dept: MRI IMAGING | Facility: CLINIC | Age: 31
End: 2021-01-08

## 2021-01-08 ENCOUNTER — OUTPATIENT (OUTPATIENT)
Dept: OUTPATIENT SERVICES | Facility: HOSPITAL | Age: 31
LOS: 1 days | End: 2021-01-08
Payer: MEDICAID

## 2021-01-08 DIAGNOSIS — Z00.8 ENCOUNTER FOR OTHER GENERAL EXAMINATION: ICD-10-CM

## 2021-01-08 PROCEDURE — 72148 MRI LUMBAR SPINE W/O DYE: CPT | Mod: 26

## 2021-01-08 PROCEDURE — 72148 MRI LUMBAR SPINE W/O DYE: CPT

## 2021-01-12 ENCOUNTER — APPOINTMENT (OUTPATIENT)
Dept: PHYSICAL MEDICINE AND REHAB | Facility: CLINIC | Age: 31
End: 2021-01-12
Payer: MEDICAID

## 2021-01-12 VITALS — DIASTOLIC BLOOD PRESSURE: 66 MMHG | SYSTOLIC BLOOD PRESSURE: 109 MMHG | HEART RATE: 77 BPM

## 2021-01-12 PROCEDURE — 99072 ADDL SUPL MATRL&STAF TM PHE: CPT

## 2021-01-12 PROCEDURE — 99214 OFFICE O/P EST MOD 30 MIN: CPT

## 2021-01-12 NOTE — PHYSICAL EXAM
[FreeTextEntry1] : Gen: NAD\par HEENT: neck supple\par CV: no cyanosis\par Pulm: breathing well on room air\par Abd: soft, non-distended, mild tenderness in RLQ, neg christine, no rebound, no guarding\par Low back: range of motion limited by pain, tenderness to palpation lower lumbar paraspinals and right sciatic notch, +straight leg raise right, neg facet loading, neg FABERE, neg FAIR\par Msk: \par 5/5 hip flexion B/L, 5/5 knee extension B/L, 5/5 knee flexion B/L, 5/5 dorsiflexion B/L, 5/5 EHL B/L, 5/5 plantar flexion B/L\par 5/5 shoulder abduction B/L, 5/5 elbow flexion B/L, 5/5 elbow extension B/L, 5/5 wrist extension B/L, 5/5 hand  B/L\par Neuro: sensation intact to light touch in bilateral upper and lower extremities, reflexes 2+ brachioradialis, biceps, triceps bilaterally, reflexes 2+ patella, medial hamstring, achilles bilaterally, negative babinski, negative espinosa\par

## 2021-01-12 NOTE — ASSESSMENT
[FreeTextEntry1] : 31 yo F who presents with low back pain with radiation into her right lower extremity consistent with lumbar radiculopathy, lumbosacral stenosis, and lumbar disc herniation.\par \par -MRI lumbar spine w/o contrast reviewed\par -I recommend that patient undergo right L4-L5, L5-S1 TFESI.  Risks and benefits discussed with patient.  Will submit for insurance approval.  Once insurance approval has been obtained, patient will be contacted to schedule injection at out-patient ambulatory center.  Covid-19 referral provided to patient on this visit and patient has been instructed to undergo testing per unit protocol.\par -Continue gabapentin 300 mg PO QID, dispense 90 tablets.  Patient denies a history of chronic kidney disease.\par -Referred patient to GI and PCP for new onset abdominal pain.  Patient already has a gynecologist and I recommend following up with her.\par -Patient asked to seek immediate medical attention should abdominal pain worsen or if she develops fevers, chills, LUTS, nausea, vomiting, or vaginal bleeding.  Red flag signs and symptoms were reviewed with patient and she is asked to seek immediate medical attention should they arise.\par \par Ismael Ivan MD\par Spine and Sports Medicine\par \par [employer]\par At [facility]\par \par

## 2021-01-12 NOTE — HISTORY OF PRESENT ILLNESS
[FreeTextEntry1] : Translation services (Bengali) used for this history and physical examination.\par 1/12/21\par 31 yo F who presents for follow up with low back pain.  Patient underwent MRI lumbar spine w/o contrast showing significant disc herniation at L4-L5 that is asymmetric to the right and impinges on the right L5 nerve root root.  The L5 vertebral body is seen to be partially sacralized.  Patient reports that she has increased the gabapentin from TID to QID with only minimal improvement in her pain.  Patient denies new weakness, numbness or paresthesia.  Denies bowel/bladder dysfunction, fevers, chills, weight loss, night pain, or night sweats.\par \par 1/5/21\par 31 yo F who presents for follow up with low back pain.  Patient last seen on 12/14/20 and started on gabapentin and tizanidine and MRI lumbar spine w/o contrast was ordered.  Patient is taking gabapentin 300 mg PO TID with no improvement.  Tizanidine was denied by her insurance company.  Patient has gotten approval for MRI but has not yet scheduled an appointment for imaging.  Patient denies new weakness, numbness or paresthesia.  Denies bowel/bladder dysfunction, fevers, chills, weight loss, night pain, or night sweats.\par \par 12/14/20\par 31 yo F with PMH cholelithiasis, pre-eclampsia who presents with low back pain.\par \par Onset: One year with progression over the last month.  No inciting events, trauma, or falls.\par Location: lower lumbar spine\par Characteristics: sharp \par Aggravating factors: prolonged sitting, standing, walking\par Alleviating factors: rest\par Radiation: right lower extremity to the right foot.\par Treatments: tylenol, prednisone, mobic with some temporary relief.  No previous physical therapy/HEP as patient with a new infant and has not been able to participate in PT.  \par Severity: 7-9/10\par \par Of note, patient with new onset right lower quadrant abdominal pain.  Pain started 2 weeks ago.  No association with PO intake.  Denies nausea, vomiting, diarrhea, or constipation.  No LUTS.  Denies vaginal bleeding or discharge.  Patient with a history of gallstones but pain at that time was in the RUQ.  \par \par Of note, patient recently gave birth to her son in Aug 2020.  Patient is not breastfeeding.\par \par Diagnostic studies:\par No previous MRI\par No previous EMG/NCS\par \par Patient denies new weakness, numbness or paresthesia.  Patient denies bowel/bladder dysfunction, fevers, chills, weight loss, night pain, or night sweats.\par

## 2021-01-13 ENCOUNTER — APPOINTMENT (OUTPATIENT)
Dept: GASTROENTEROLOGY | Facility: CLINIC | Age: 31
End: 2021-01-13
Payer: MEDICAID

## 2021-01-13 VITALS
DIASTOLIC BLOOD PRESSURE: 70 MMHG | TEMPERATURE: 97.1 F | WEIGHT: 160 LBS | HEIGHT: 62 IN | BODY MASS INDEX: 29.44 KG/M2 | SYSTOLIC BLOOD PRESSURE: 118 MMHG

## 2021-01-13 PROCEDURE — 99072 ADDL SUPL MATRL&STAF TM PHE: CPT

## 2021-01-13 PROCEDURE — 36415 COLL VENOUS BLD VENIPUNCTURE: CPT

## 2021-01-13 PROCEDURE — 99204 OFFICE O/P NEW MOD 45 MIN: CPT | Mod: 25

## 2021-01-13 NOTE — HISTORY OF PRESENT ILLNESS
[FreeTextEntry1] : 31 yo female with c/o ruq pain for 3 months, patient with h/o low back pain and  right leg pain. Patient reports ruq pain, currently 7/10 pain scale, worse at night. patient reports burning pain, lasts for  minutes. pain unrelated to food. pain unrelated to bm. no n/v/no gerd.  Pain worse with movement. Patient on pain medication for back pain.\par no weight loss. normal bms, no brbpr, no melena.\par \par Patient had similar pain in August /2020 when admitted to [facility], abdominal us gallstones, hida scan negative

## 2021-01-13 NOTE — PHYSICAL EXAM
[General Appearance - Alert] : alert [General Appearance - In No Acute Distress] : in no acute distress [General Appearance - Well Nourished] : well nourished [General Appearance - Well Developed] : well developed [Sclera] : the sclera and conjunctiva were normal [Hearing Threshold Finger Rub Not [place]] : hearing was normal [Bowel Sounds] : normal bowel sounds [Abdomen Soft] : soft [No CVA Tenderness] : no ~M costovertebral angle tenderness [Abnormal Walk] : normal gait [Respiration, Rhythm And Depth] : normal respiratory rhythm and effort [Auscultation Breath Sounds / Voice Sounds] : lungs were clear to auscultation bilaterally [Heart Sounds] : normal S1 and S2 [FreeTextEntry1] : epigastric, ruq and rlq tenderness on moderate palpation, no rebound no guarding [Nail Clubbing] : no clubbing  or cyanosis of the fingernails [] : no rash [Skin Lesions] : no skin lesions [No Focal Deficits] : no focal deficits [Oriented To Time, Place, And Person] : oriented to person, place, and time

## 2021-01-13 NOTE — REVIEW OF SYSTEMS
[As Noted in HPI] : as noted in HPI [Arthralgias] : arthralgias [Joint Pain] : joint pain [Limb Pain] : limb pain [Fever] : no fever [Eyesight Problems] : no eyesight problems [Nosebleeds] : no nosebleeds [Chest Pain] : no chest pain [Dizziness] : no dizziness [Anxiety] : no anxiety [Depression] : no depression [Muscle Weakness] : no muscle weakness [Easy Bleeding] : no tendency for easy bleeding [Easy Bruising] : no tendency for easy bruising

## 2021-01-13 NOTE — ASSESSMENT
[FreeTextEntry1] : right sided abdominal pain, with tenderness on exam, chronic since 08/2020 evaluated by surgery at [facility], unrelated to food, worse with bm, ddx; gerd,gastritis,biliary disease,musculoskeletal pain\par \par plan ppi daily\par          lfts, lipase today\par           repeat abdominal us\par           f/u gi after us done consider egd/surgical evaluation

## 2021-01-14 ENCOUNTER — APPOINTMENT (OUTPATIENT)
Dept: ULTRASOUND IMAGING | Facility: IMAGING CENTER | Age: 31
End: 2021-01-14
Payer: MEDICAID

## 2021-01-14 ENCOUNTER — OUTPATIENT (OUTPATIENT)
Dept: OUTPATIENT SERVICES | Facility: HOSPITAL | Age: 31
LOS: 1 days | End: 2021-01-14
Payer: MEDICAID

## 2021-01-14 DIAGNOSIS — R10.9 UNSPECIFIED ABDOMINAL PAIN: ICD-10-CM

## 2021-01-14 LAB
ALBUMIN SERPL ELPH-MCNC: 4.9 G/DL
ALP BLD-CCNC: 146 U/L
ALT SERPL-CCNC: 50 U/L
AST SERPL-CCNC: 27 U/L
BILIRUB DIRECT SERPL-MCNC: 0.1 MG/DL
BILIRUB INDIRECT SERPL-MCNC: 0.6 MG/DL
BILIRUB SERPL-MCNC: 0.8 MG/DL
PROT SERPL-MCNC: 7.6 G/DL

## 2021-01-14 PROCEDURE — 76700 US EXAM ABDOM COMPLETE: CPT

## 2021-01-14 PROCEDURE — 76700 US EXAM ABDOM COMPLETE: CPT | Mod: 26

## 2021-01-15 LAB — LPL SERPL-CCNC: 17 U/L

## 2021-01-20 ENCOUNTER — APPOINTMENT (OUTPATIENT)
Dept: INTERNAL MEDICINE | Facility: CLINIC | Age: 31
End: 2021-01-20
Payer: MEDICAID

## 2021-01-20 ENCOUNTER — OUTPATIENT (OUTPATIENT)
Dept: OUTPATIENT SERVICES | Facility: HOSPITAL | Age: 31
LOS: 1 days | End: 2021-01-20
Payer: MEDICAID

## 2021-01-20 ENCOUNTER — APPOINTMENT (OUTPATIENT)
Dept: RADIOLOGY | Facility: CLINIC | Age: 31
End: 2021-01-20
Payer: MEDICAID

## 2021-01-20 VITALS
BODY MASS INDEX: 29.07 KG/M2 | SYSTOLIC BLOOD PRESSURE: 110 MMHG | DIASTOLIC BLOOD PRESSURE: 80 MMHG | WEIGHT: 157.98 LBS | HEIGHT: 62 IN

## 2021-01-20 DIAGNOSIS — Z00.8 ENCOUNTER FOR OTHER GENERAL EXAMINATION: ICD-10-CM

## 2021-01-20 PROCEDURE — 71046 X-RAY EXAM CHEST 2 VIEWS: CPT

## 2021-01-20 PROCEDURE — 99072 ADDL SUPL MATRL&STAF TM PHE: CPT

## 2021-01-20 PROCEDURE — 71046 X-RAY EXAM CHEST 2 VIEWS: CPT | Mod: 26

## 2021-01-20 PROCEDURE — 99204 OFFICE O/P NEW MOD 45 MIN: CPT | Mod: 25

## 2021-01-20 NOTE — ASSESSMENT
[FreeTextEntry1] : h/o pleural eff-repeat CXR (NW)\par FBW\par follow BP\par A1C\par LBP-per Phy Med\par abd pain-per GI and gen surg\par CPE

## 2021-01-20 NOTE — PHYSICAL EXAM
[Well Nourished] : well nourished [Well Developed] : well developed [No Respiratory Distress] : no respiratory distress  [No Accessory Muscle Use] : no accessory muscle use [Clear to Auscultation] : lungs were clear to auscultation bilaterally [Normal Rate] : normal rate  [Regular Rhythm] : with a regular rhythm [Soft] : abdomen soft [Normal Posterior Cervical Nodes] : no posterior cervical lymphadenopathy [Normal Anterior Cervical Nodes] : no anterior cervical lymphadenopathy

## 2021-01-21 LAB
ALBUMIN SERPL ELPH-MCNC: 4.6 G/DL
ALP BLD-CCNC: 130 U/L
ALT SERPL-CCNC: 52 U/L
ANION GAP SERPL CALC-SCNC: 14 MMOL/L
AST SERPL-CCNC: 27 U/L
BASOPHILS # BLD AUTO: 0.03 K/UL
BASOPHILS NFR BLD AUTO: 0.4 %
BILIRUB SERPL-MCNC: 0.4 MG/DL
BUN SERPL-MCNC: 18 MG/DL
CALCIUM SERPL-MCNC: 11 MG/DL
CHLORIDE SERPL-SCNC: 103 MMOL/L
CHOLEST SERPL-MCNC: 263 MG/DL
CO2 SERPL-SCNC: 20 MMOL/L
CREAT SERPL-MCNC: 0.65 MG/DL
EOSINOPHIL # BLD AUTO: 0.12 K/UL
EOSINOPHIL NFR BLD AUTO: 1.6 %
ESTIMATED AVERAGE GLUCOSE: 108 MG/DL
GLUCOSE SERPL-MCNC: 90 MG/DL
HBA1C MFR BLD HPLC: 5.4 %
HBV SURFACE AB SER QL: NONREACTIVE
HBV SURFACE AG SER QL: NONREACTIVE
HCT VFR BLD CALC: 40.9 %
HCV AB SER QL: NONREACTIVE
HCV S/CO RATIO: 0.09 S/CO
HDLC SERPL-MCNC: 41 MG/DL
HGB BLD-MCNC: 13 G/DL
IMM GRANULOCYTES NFR BLD AUTO: 0.3 %
LDLC SERPL CALC-MCNC: NORMAL MG/DL
LYMPHOCYTES # BLD AUTO: 3.48 K/UL
LYMPHOCYTES NFR BLD AUTO: 47.8 %
MAN DIFF?: NORMAL
MCHC RBC-ENTMCNC: 29.5 PG
MCHC RBC-ENTMCNC: 31.8 GM/DL
MCV RBC AUTO: 92.7 FL
MONOCYTES # BLD AUTO: 0.39 K/UL
MONOCYTES NFR BLD AUTO: 5.4 %
NEUTROPHILS # BLD AUTO: 3.24 K/UL
NEUTROPHILS NFR BLD AUTO: 44.5 %
NONHDLC SERPL-MCNC: 222 MG/DL
PLATELET # BLD AUTO: 226 K/UL
POTASSIUM SERPL-SCNC: 4 MMOL/L
PROT SERPL-MCNC: 7.9 G/DL
RBC # BLD: 4.41 M/UL
RBC # FLD: 11.9 %
SARS-COV-2 IGG SERPL IA-ACNC: 0.07 INDEX
SARS-COV-2 IGG SERPL QL IA: NEGATIVE
SODIUM SERPL-SCNC: 137 MMOL/L
TRIGL SERPL-MCNC: 447 MG/DL
TSH SERPL-ACNC: 6.47 UIU/ML
WBC # FLD AUTO: 7.28 K/UL

## 2021-01-25 ENCOUNTER — APPOINTMENT (OUTPATIENT)
Dept: PHYSICAL MEDICINE AND REHAB | Facility: CLINIC | Age: 31
End: 2021-01-25
Payer: MEDICAID

## 2021-01-25 VITALS — DIASTOLIC BLOOD PRESSURE: 80 MMHG | TEMPERATURE: 97 F | SYSTOLIC BLOOD PRESSURE: 120 MMHG

## 2021-01-25 PROCEDURE — 99072 ADDL SUPL MATRL&STAF TM PHE: CPT

## 2021-01-25 PROCEDURE — 99214 OFFICE O/P EST MOD 30 MIN: CPT

## 2021-01-25 NOTE — HISTORY OF PRESENT ILLNESS
[FreeTextEntry1] : Translation services (Vietnamese) used for this history and physical examination.\par 1/25/21\par 29 yo F who presents for follow up with low back pain.  Patient reports that she continues to have low back pain with radiation into the right lower extremity.  Patient had been referred for lumbar DONAVON and we are currently awaiting insurance approval/ scheduling at [facility].  Patient reports that she is taking gabapentin 300 mg PO QID with no significant improvement.  Patient has been followed by GI for RUQ and epigastric pain with elevated LFT's.  General surgery referral provided and patient recommended to undergo repeat abdominal US.  Patient denies new weakness, numbness or paresthesia.  Denies bowel/bladder dysfunction, fevers, chills, weight loss, night pain, or night sweats.\par \par 1/12/21\par 29 yo F who presents for follow up with low back pain.  Patient underwent MRI lumbar spine w/o contrast showing significant disc herniation at L4-L5 that is asymmetric to the right and impinges on the right L5 nerve root root.  The L5 vertebral body is seen to be partially sacralized.  Patient reports that she has increased the gabapentin from TID to QID with only minimal improvement in her pain.  Patient denies new weakness, numbness or paresthesia.  Denies bowel/bladder dysfunction, fevers, chills, weight loss, night pain, or night sweats.\par \par 1/5/21\par 29 yo F who presents for follow up with low back pain.  Patient last seen on 12/14/20 and started on gabapentin and tizanidine and MRI lumbar spine w/o contrast was ordered.  Patient is taking gabapentin 300 mg PO TID with no improvement.  Tizanidine was denied by her insurance company.  Patient has gotten approval for MRI but has not yet scheduled an appointment for imaging.  Patient denies new weakness, numbness or paresthesia.  Denies bowel/bladder dysfunction, fevers, chills, weight loss, night pain, or night sweats.\par \par 12/14/20\par 29 yo F with PMH cholelithiasis, pre-eclampsia who presents with low back pain.\par \par Onset: One year with progression over the last month.  No inciting events, trauma, or falls.\par Location: lower lumbar spine\par Characteristics: sharp \par Aggravating factors: prolonged sitting, standing, walking\par Alleviating factors: rest\par Radiation: right lower extremity to the right foot.\par Treatments: tylenol, prednisone, mobic with some temporary relief.  No previous physical therapy/HEP as patient with a new infant and has not been able to participate in PT.  \par Severity: 7-9/10\par \par Of note, patient with new onset right lower quadrant abdominal pain.  Pain started 2 weeks ago.  No association with PO intake.  Denies nausea, vomiting, diarrhea, or constipation.  No LUTS.  Denies vaginal bleeding or discharge.  Patient with a history of gallstones but pain at that time was in the RUQ.  \par \par Of note, patient recently gave birth to her son in Aug 2020.  Patient is not breastfeeding.\par \par Diagnostic studies:\par No previous MRI\par No previous EMG/NCS\par \par Patient denies new weakness, numbness or paresthesia.  Patient denies bowel/bladder dysfunction, fevers, chills, weight loss, night pain, or night sweats.\par

## 2021-01-25 NOTE — ASSESSMENT
[FreeTextEntry1] : 29 yo F who presents with low back pain with radiation into her right lower extremity consistent with lumbar radiculopathy, lumbosacral stenosis, and lumbar disc herniation.\par \par Several treatment options discussed with Ms. Rodríguez on this visit including referral to Spine Surgery and lumbar DONAVON.  Patient neurologically intact and no red flag signs or symptoms.  MRI lumbar spine w/o contrast reviewed showing disc herniation at L4-L5.  I recommend lumbar DONAVON with close follow up and monitoring.  Red flag signs and symptoms reviewed and patient instructed to seek immediate medical care should these arise.\par \par -MRI lumbar spine w/o contrast reviewed\par -I recommend that patient undergo right L4-L5, L5-S1 TFESI.  Risks and benefits discussed with patient.  Currently pending insurance approval.  Once insurance approval has been obtained, patient will be contacted to schedule injection at out-patient ambulatory center.  Covid-19 referral provided to patient on this visit and patient has been instructed to undergo testing per unit protocol.\par -Continue gabapentin 300 mg PO QID, dispense 90 tablets.  Patient denies a history of chronic kidney disease.\par -GI notes reviewed and recommendations appreciated.  Patient will be following up with Gen Surg.  Patient seen to have elevated liver enzymes and will avoid tylenol and hepatoxic agents while work up is ongoing.\par \par Ismael Ivan MD\par Spine and Sports Medicine\par \par [employer]\par At \[facility]\""/[facility]\par \par

## 2021-01-25 NOTE — PHYSICAL EXAM
[FreeTextEntry1] : Gen: NAD\par HEENT: neck supple\par CV: no cyanosis\par Pulm: breathing well on room air\par Abd: soft, non-distended, mild tenderness in RUQ, neg christine, no rebound, no guarding\par Low back: range of motion limited by pain, tenderness to palpation lower lumbar paraspinals and right sciatic notch, +straight leg raise right, neg facet loading, neg FABERE, neg FAIR\par Msk: exam limited by pain\par 5/5 hip flexion B/L, 5/5 knee extension B/L, 5/5 knee flexion B/L, 5/5 dorsiflexion B/L, 5/5 EHL B/L, 5/5 plantar flexion B/L\par 5/5 shoulder abduction B/L, 5/5 elbow flexion B/L, 5/5 elbow extension B/L, 5/5 wrist extension B/L, 5/5 hand  B/L\par Neuro: sensation intact to light touch in bilateral upper and lower extremities, reflexes 2+ brachioradialis, biceps, triceps bilaterally, reflexes 2+ patella, medial hamstring, achilles bilaterally, negative babinski, negative espinosa\par

## 2021-01-27 ENCOUNTER — APPOINTMENT (OUTPATIENT)
Dept: SURGERY | Facility: CLINIC | Age: 31
End: 2021-01-27
Payer: MEDICAID

## 2021-01-27 VITALS
TEMPERATURE: 97.3 F | HEART RATE: 78 BPM | SYSTOLIC BLOOD PRESSURE: 136 MMHG | DIASTOLIC BLOOD PRESSURE: 86 MMHG | RESPIRATION RATE: 16 BRPM | OXYGEN SATURATION: 99 %

## 2021-01-27 DIAGNOSIS — Z83.79 FAMILY HISTORY OF OTHER DISEASES OF THE DIGESTIVE SYSTEM: ICD-10-CM

## 2021-01-27 PROCEDURE — 99072 ADDL SUPL MATRL&STAF TM PHE: CPT

## 2021-01-27 PROCEDURE — 99204 OFFICE O/P NEW MOD 45 MIN: CPT

## 2021-01-27 RX ORDER — METHOCARBAMOL 500 MG/1
500 TABLET, FILM COATED ORAL
Qty: 30 | Refills: 0 | Status: DISCONTINUED | COMMUNITY
Start: 2021-01-25 | End: 2021-01-27

## 2021-01-27 NOTE — ADDENDUM
[FreeTextEntry1] : This patient is suffering from biliary colic. I Have offered her a laparoscopic cholecystectomy.  The procedure as well as risks(including, but not limited to bleeding, infection, injury to hollow viscus, injury to bile ducts), benefits (pain relief), and alternatives were explained to the patient.\par \par Above conversation was had with her sister listening in on the telephone.  All questions were answered

## 2021-01-27 NOTE — PHYSICAL EXAM
[Normal Breath Sounds] : Normal breath sounds [Normal Heart Sounds] : normal heart sounds [No Rash or Lesion] : No rash or lesion [Calm] : calm [de-identified] : No acute distress [de-identified] : Sclera clear [de-identified] : Supple [de-identified] : Soft with very mild right upper quadrant tenderness.  There are no masses and there is no Haines's sign [de-identified] : No clubbing cyanosis or edema [de-identified] : Cranial nerves II through XII grossly intact

## 2021-01-27 NOTE — CONSULT LETTER
[Dear  ___] : Dear  [unfilled], [Consult Letter:] : I had the pleasure of evaluating your patient, [unfilled]. [Please see my note below.] : Please see my note below. [Consult Closing:] : Thank you very much for allowing me to participate in the care of this patient.  If you have any questions, please do not hesitate to contact me. [Sincerely,] : Sincerely, [DrMadison  ___] : Dr. THOMAS [FreeTextEntry3] : .sig

## 2021-02-05 ENCOUNTER — APPOINTMENT (OUTPATIENT)
Dept: INTERNAL MEDICINE | Facility: CLINIC | Age: 31
End: 2021-02-05
Payer: MEDICAID

## 2021-02-05 VITALS
HEIGHT: 62 IN | DIASTOLIC BLOOD PRESSURE: 70 MMHG | BODY MASS INDEX: 29.44 KG/M2 | WEIGHT: 160 LBS | SYSTOLIC BLOOD PRESSURE: 110 MMHG

## 2021-02-05 PROCEDURE — 99395 PREV VISIT EST AGE 18-39: CPT

## 2021-02-05 PROCEDURE — 99072 ADDL SUPL MATRL&STAF TM PHE: CPT

## 2021-02-05 NOTE — ASSESSMENT
[FreeTextEntry1] : gall stones-saw surg.  need lap eric\par follow TFT\par follow lipid\par follow LFT

## 2021-02-05 NOTE — PHYSICAL EXAM
[No Acute Distress] : no acute distress [No Respiratory Distress] : no respiratory distress  [No Accessory Muscle Use] : no accessory muscle use [Clear to Auscultation] : lungs were clear to auscultation bilaterally [Normal Rate] : normal rate  [Regular Rhythm] : with a regular rhythm [No Edema] : there was no peripheral edema [Soft] : abdomen soft [Non Tender] : non-tender [Normal Posterior Cervical Nodes] : no posterior cervical lymphadenopathy [Normal Anterior Cervical Nodes] : no anterior cervical lymphadenopathy

## 2021-02-07 ENCOUNTER — RX RENEWAL (OUTPATIENT)
Age: 31
End: 2021-02-07

## 2021-02-12 ENCOUNTER — APPOINTMENT (OUTPATIENT)
Dept: DISASTER EMERGENCY | Facility: CLINIC | Age: 31
End: 2021-02-12

## 2021-02-13 LAB — SARS-COV-2 N GENE NPH QL NAA+PROBE: NOT DETECTED

## 2021-02-16 ENCOUNTER — OUTPATIENT (OUTPATIENT)
Dept: OUTPATIENT SERVICES | Facility: HOSPITAL | Age: 31
LOS: 1 days | End: 2021-02-16
Payer: MEDICAID

## 2021-02-16 ENCOUNTER — APPOINTMENT (OUTPATIENT)
Dept: INTERNAL MEDICINE | Facility: CLINIC | Age: 31
End: 2021-02-16
Payer: MEDICAID

## 2021-02-16 ENCOUNTER — NON-APPOINTMENT (OUTPATIENT)
Age: 31
End: 2021-02-16

## 2021-02-16 ENCOUNTER — APPOINTMENT (OUTPATIENT)
Dept: PHYSICAL MEDICINE AND REHAB | Facility: CLINIC | Age: 31
End: 2021-02-16

## 2021-02-16 VITALS
TEMPERATURE: 98.1 F | HEART RATE: 87 BPM | HEIGHT: 62 IN | WEIGHT: 161 LBS | OXYGEN SATURATION: 98 % | BODY MASS INDEX: 29.63 KG/M2 | DIASTOLIC BLOOD PRESSURE: 70 MMHG | SYSTOLIC BLOOD PRESSURE: 118 MMHG

## 2021-02-16 DIAGNOSIS — M54.16 RADICULOPATHY, LUMBAR REGION: ICD-10-CM

## 2021-02-16 PROCEDURE — 64483 NJX AA&/STRD TFRM EPI L/S 1: CPT

## 2021-02-16 PROCEDURE — 64484 NJX AA&/STRD TFRM EPI L/S EA: CPT | Mod: RT

## 2021-02-16 PROCEDURE — 93000 ELECTROCARDIOGRAM COMPLETE: CPT

## 2021-02-16 PROCEDURE — 64484 NJX AA&/STRD TFRM EPI L/S EA: CPT

## 2021-02-16 PROCEDURE — 64483 NJX AA&/STRD TFRM EPI L/S 1: CPT | Mod: RT

## 2021-02-16 PROCEDURE — 99214 OFFICE O/P EST MOD 30 MIN: CPT | Mod: 25

## 2021-02-16 PROCEDURE — 99072 ADDL SUPL MATRL&STAF TM PHE: CPT

## 2021-02-16 RX ORDER — HYDRALAZINE HYDROCHLORIDE 25 MG/1
25 TABLET ORAL
Qty: 270 | Refills: 3 | Status: DISCONTINUED | COMMUNITY
Start: 2021-02-07 | End: 2021-02-16

## 2021-02-18 ENCOUNTER — RX RENEWAL (OUTPATIENT)
Age: 31
End: 2021-02-18

## 2021-02-19 ENCOUNTER — OUTPATIENT (OUTPATIENT)
Dept: OUTPATIENT SERVICES | Facility: HOSPITAL | Age: 31
LOS: 1 days | End: 2021-02-19
Payer: MEDICAID

## 2021-02-19 VITALS
SYSTOLIC BLOOD PRESSURE: 116 MMHG | WEIGHT: 164.02 LBS | TEMPERATURE: 98 F | OXYGEN SATURATION: 98 % | DIASTOLIC BLOOD PRESSURE: 79 MMHG | HEIGHT: 62 IN | RESPIRATION RATE: 18 BRPM | HEART RATE: 77 BPM

## 2021-02-19 DIAGNOSIS — M51.26 OTHER INTERVERTEBRAL DISC DISPLACEMENT, LUMBAR REGION: ICD-10-CM

## 2021-02-19 DIAGNOSIS — K80.20 CALCULUS OF GALLBLADDER WITHOUT CHOLECYSTITIS WITHOUT OBSTRUCTION: ICD-10-CM

## 2021-02-19 DIAGNOSIS — Z01.818 ENCOUNTER FOR OTHER PREPROCEDURAL EXAMINATION: ICD-10-CM

## 2021-02-19 LAB
ALBUMIN SERPL ELPH-MCNC: 4.1 G/DL — SIGNIFICANT CHANGE UP (ref 3.3–5)
ALP SERPL-CCNC: 89 U/L — SIGNIFICANT CHANGE UP (ref 40–120)
ALT FLD-CCNC: 31 U/L — SIGNIFICANT CHANGE UP (ref 10–45)
ANION GAP SERPL CALC-SCNC: 11 MMOL/L — SIGNIFICANT CHANGE UP (ref 5–17)
AST SERPL-CCNC: 16 U/L — SIGNIFICANT CHANGE UP (ref 10–40)
BILIRUB SERPL-MCNC: 0.4 MG/DL — SIGNIFICANT CHANGE UP (ref 0.2–1.2)
BUN SERPL-MCNC: 16 MG/DL — SIGNIFICANT CHANGE UP (ref 7–23)
CALCIUM SERPL-MCNC: 9.4 MG/DL — SIGNIFICANT CHANGE UP (ref 8.4–10.5)
CHLORIDE SERPL-SCNC: 101 MMOL/L — SIGNIFICANT CHANGE UP (ref 96–108)
CO2 SERPL-SCNC: 24 MMOL/L — SIGNIFICANT CHANGE UP (ref 22–31)
CREAT SERPL-MCNC: 0.55 MG/DL — SIGNIFICANT CHANGE UP (ref 0.5–1.3)
GLUCOSE SERPL-MCNC: 93 MG/DL — SIGNIFICANT CHANGE UP (ref 70–99)
HCT VFR BLD CALC: 36.4 % — SIGNIFICANT CHANGE UP (ref 34.5–45)
HGB BLD-MCNC: 11.9 G/DL — SIGNIFICANT CHANGE UP (ref 11.5–15.5)
MCHC RBC-ENTMCNC: 28.8 PG — SIGNIFICANT CHANGE UP (ref 27–34)
MCHC RBC-ENTMCNC: 32.7 GM/DL — SIGNIFICANT CHANGE UP (ref 32–36)
MCV RBC AUTO: 88.1 FL — SIGNIFICANT CHANGE UP (ref 80–100)
NRBC # BLD: 0 /100 WBCS — SIGNIFICANT CHANGE UP (ref 0–0)
PLATELET # BLD AUTO: 242 K/UL — SIGNIFICANT CHANGE UP (ref 150–400)
POTASSIUM SERPL-MCNC: 3.5 MMOL/L — SIGNIFICANT CHANGE UP (ref 3.5–5.3)
POTASSIUM SERPL-SCNC: 3.5 MMOL/L — SIGNIFICANT CHANGE UP (ref 3.5–5.3)
PROT SERPL-MCNC: 7.4 G/DL — SIGNIFICANT CHANGE UP (ref 6–8.3)
RBC # BLD: 4.13 M/UL — SIGNIFICANT CHANGE UP (ref 3.8–5.2)
RBC # FLD: 11.9 % — SIGNIFICANT CHANGE UP (ref 10.3–14.5)
SODIUM SERPL-SCNC: 136 MMOL/L — SIGNIFICANT CHANGE UP (ref 135–145)
WBC # BLD: 8.93 K/UL — SIGNIFICANT CHANGE UP (ref 3.8–10.5)
WBC # FLD AUTO: 8.93 K/UL — SIGNIFICANT CHANGE UP (ref 3.8–10.5)

## 2021-02-19 PROCEDURE — 85027 COMPLETE CBC AUTOMATED: CPT

## 2021-02-19 PROCEDURE — 80053 COMPREHEN METABOLIC PANEL: CPT

## 2021-02-19 PROCEDURE — G0463: CPT

## 2021-02-19 RX ORDER — LIDOCAINE HCL 20 MG/ML
0.2 VIAL (ML) INJECTION ONCE
Refills: 0 | Status: DISCONTINUED | OUTPATIENT
Start: 2021-02-23 | End: 2021-03-09

## 2021-02-19 RX ORDER — CHLORHEXIDINE GLUCONATE 213 G/1000ML
1 SOLUTION TOPICAL ONCE
Refills: 0 | Status: DISCONTINUED | OUTPATIENT
Start: 2021-02-23 | End: 2021-03-09

## 2021-02-19 RX ORDER — SODIUM CHLORIDE 9 MG/ML
3 INJECTION INTRAMUSCULAR; INTRAVENOUS; SUBCUTANEOUS EVERY 8 HOURS
Refills: 0 | Status: DISCONTINUED | OUTPATIENT
Start: 2021-02-23 | End: 2021-03-09

## 2021-02-19 RX ORDER — CEFOTETAN DISODIUM 1 G
2 VIAL (EA) INJECTION ONCE
Refills: 0 | Status: DISCONTINUED | OUTPATIENT
Start: 2021-02-23 | End: 2021-03-09

## 2021-02-19 NOTE — H&P PST ADULT - HISTORY OF PRESENT ILLNESS
30 YEAR OLD FEMALE WITH PMH OF GALLBADDER CALCULUS, HERNIATED LUMBAR DISC 30 YEAR OLD FEMALE WITH PMH OF PRE GALLBADDER CALCULUS, HERNIATED LUMBAR DISC    Patient is a 29F 6 days after , re-presented 2 days ago for severe epigastric abdominal pain & found to be severely hypertensive. Found to have cholelithiasis with mild GB wall thickening, elevated transaminases, anemia & elevated LDH.      30 YEAR OLD FEMALE WITH PMH OF LUMBAR HNP AND  GALLSTONES WHO PRESENT TO [facility] TODAY FOR EVALUATION PRIOR TO LAPAROSCOPIC CHOLECYSTECTOMY ON 2/23/2021. SHE DENIES FEVER, CHILLS, NAUSEA/VOMITING, EPIGASTRIC/ABDOMINAL PAIN, RECENT TRAVEL, OR SICK CONTACTS.  SHE DENIES PRIOR COVID-19 INFECTION    PREOP COVID SCREENING 2/21 AT [facility]

## 2021-02-19 NOTE — H&P PST ADULT - NSICDXPROBLEM_GEN_ALL_CORE_FT
PROBLEM DIAGNOSES  Problem: Gallbladder calculus  Assessment and Plan: Laparoscopic Cholecystectomy  cbc, cmp drawn at [facility]  Medical clearance note placed on chart    Problem: Lumbar herniated disc  Assessment and Plan: patient  instructed that she may take Gabapentin on DOS with sips of water

## 2021-02-19 NOTE — H&P PST ADULT - NSICDXPASTMEDICALHX_GEN_ALL_CORE_FT
PAST MEDICAL HISTORY:  Calculus of gallbladder diagnosed 8/2020; hospitalized 8/9-16. 2020 with acute eric    History of pleural effusion right side --august 2020 while   now resolved    HNP (herniated nucleus pulposus), lumbar      PAST MEDICAL HISTORY:  Calculus of gallbladder diagnosed 8/2020; hospitalized 8/9-16. 2020 with acute eric    History of pleural effusion right side --august 2020 while   now resolved    HNP (herniated nucleus pulposus), lumbar     Preeclampsia August 2020     PAST MEDICAL HISTORY:  Calculus of gallbladder diagnosed 8/2020; hospitalized 8/9-16. 2020 with acute eric    History of pleural effusion right side --august 2020  now resolved    HNP (herniated nucleus pulposus), lumbar     Preeclampsia August 2020

## 2021-02-20 ENCOUNTER — OUTPATIENT (OUTPATIENT)
Dept: OUTPATIENT SERVICES | Facility: HOSPITAL | Age: 31
LOS: 1 days | End: 2021-02-20
Payer: MEDICAID

## 2021-02-20 DIAGNOSIS — Z11.52 ENCOUNTER FOR SCREENING FOR COVID-19: ICD-10-CM

## 2021-02-20 LAB — SARS-COV-2 RNA SPEC QL NAA+PROBE: SIGNIFICANT CHANGE UP

## 2021-02-20 PROCEDURE — U0003: CPT

## 2021-02-20 PROCEDURE — U0005: CPT

## 2021-02-20 PROCEDURE — C9803: CPT

## 2021-02-22 ENCOUNTER — TRANSCRIPTION ENCOUNTER (OUTPATIENT)
Age: 31
End: 2021-02-22

## 2021-02-23 ENCOUNTER — APPOINTMENT (OUTPATIENT)
Dept: SURGERY | Facility: HOSPITAL | Age: 31
End: 2021-02-23
Payer: MEDICAID

## 2021-02-23 ENCOUNTER — RESULT REVIEW (OUTPATIENT)
Age: 31
End: 2021-02-23

## 2021-02-23 ENCOUNTER — OUTPATIENT (OUTPATIENT)
Dept: OUTPATIENT SERVICES | Facility: HOSPITAL | Age: 31
LOS: 1 days | End: 2021-02-23
Payer: MEDICAID

## 2021-02-23 VITALS
SYSTOLIC BLOOD PRESSURE: 113 MMHG | HEART RATE: 80 BPM | OXYGEN SATURATION: 99 % | DIASTOLIC BLOOD PRESSURE: 66 MMHG | RESPIRATION RATE: 17 BRPM

## 2021-02-23 VITALS
RESPIRATION RATE: 14 BRPM | HEART RATE: 76 BPM | HEIGHT: 62 IN | WEIGHT: 164.02 LBS | OXYGEN SATURATION: 98 % | SYSTOLIC BLOOD PRESSURE: 123 MMHG | DIASTOLIC BLOOD PRESSURE: 80 MMHG | TEMPERATURE: 98 F

## 2021-02-23 DIAGNOSIS — K80.20 CALCULUS OF GALLBLADDER WITHOUT CHOLECYSTITIS WITHOUT OBSTRUCTION: ICD-10-CM

## 2021-02-23 PROCEDURE — 47563 LAPARO CHOLECYSTECTOMY/GRAPH: CPT | Mod: 82

## 2021-02-23 PROCEDURE — 47563 LAPARO CHOLECYSTECTOMY/GRAPH: CPT

## 2021-02-23 PROCEDURE — 47562 LAPAROSCOPIC CHOLECYSTECTOMY: CPT

## 2021-02-23 PROCEDURE — C9399: CPT

## 2021-02-23 PROCEDURE — 88304 TISSUE EXAM BY PATHOLOGIST: CPT

## 2021-02-23 PROCEDURE — 88304 TISSUE EXAM BY PATHOLOGIST: CPT | Mod: 26

## 2021-02-23 PROCEDURE — C1889: CPT

## 2021-02-23 RX ORDER — SODIUM CHLORIDE 9 MG/ML
1000 INJECTION, SOLUTION INTRAVENOUS
Refills: 0 | Status: DISCONTINUED | OUTPATIENT
Start: 2021-02-23 | End: 2021-03-09

## 2021-02-23 RX ORDER — OXYCODONE HYDROCHLORIDE 5 MG/1
10 TABLET ORAL ONCE
Refills: 0 | Status: DISCONTINUED | OUTPATIENT
Start: 2021-02-23 | End: 2021-02-23

## 2021-02-23 RX ORDER — ONDANSETRON 8 MG/1
4 TABLET, FILM COATED ORAL ONCE
Refills: 0 | Status: DISCONTINUED | OUTPATIENT
Start: 2021-02-23 | End: 2021-03-09

## 2021-02-23 RX ORDER — OXYCODONE HYDROCHLORIDE 5 MG/1
5 TABLET ORAL ONCE
Refills: 0 | Status: DISCONTINUED | OUTPATIENT
Start: 2021-02-23 | End: 2021-02-23

## 2021-02-23 RX ORDER — FENTANYL CITRATE 50 UG/ML
25 INJECTION INTRAVENOUS
Refills: 0 | Status: DISCONTINUED | OUTPATIENT
Start: 2021-02-23 | End: 2021-02-23

## 2021-02-23 RX ORDER — OXYCODONE HYDROCHLORIDE 5 MG/1
1 TABLET ORAL
Qty: 6 | Refills: 0
Start: 2021-02-23 | End: 2021-02-23

## 2021-02-23 NOTE — ASU PATIENT PROFILE, ADULT - PATIENT/CAREGIVER ACCEPTED INTERPRETER SERVICES
Patient:   ALE ERIC            MRN: [id number]            FIN: [id number]              Age:   76 years     Sex:  FEMALE     :  47   Associated Diagnoses:   None   Author:   Brianda Hawk     Subjective   Follow-up visit: CAD, S/P CABG  S: Patient seen and examined sitting up in the chair, eating breakfast. She denies chest pain, shortness of breath, palpitations, dizziness, or lightheadedness. She does endorse some incisional tenderness.  12 point review of systems negative except as noted above.      Health Status   Allergies:    Allergies (1) Active Reaction  NKA None Documented    Current medications: Medications (33) Active  Scheduled: (16)  *Do NOT give IM Injections  1 each, N/A, Daily  *Magnesium Protocol Communication  1 each, N/A, Daily  *Potassium Protocol Communication  1 each, N/A, Daily  Aspirin 325 mg tab  325 mg 1 tab, NG-tube, Daily  Atorvastatin 40 mg tab  40 mg 1 tab, Oral, Daily  Chlorhexidine gluconate 2% topical CLOTH 2s  6 pad, Topical, Daily  Docusate sodium 100 mg cap  100 mg 1 cap, Oral, BID  Famotidine 20 mg tab  20 mg 1 tab, Oral, Q12H  Furosemide 40 mg tab  40 mg 1 tab, Oral, BID  Heparin 5,000 unit/1 mL inj  5,000 unit 1 mL, Subcutaneous, Q8H  Insulin human lispro 10 unit/0.1 mL inj  1-6 units, Subcutaneous, QID [with meals & HS]  Levothyroxine 50 mcg tab  50 mcg 1 tab, Oral, QAM at 6  Lisinopril 10 mg tab  10 mg 1 tab, Oral, Q12H  Metoprolol tartrate 25 mg tab  25 mg 1 tab, Oral, Q12H  Multivitamin-minerals therapeutic tab  1 tab, Oral, Daily  Pneumococcal adult vaccine 23-polyvalent 0.5 mL IM inj SDV  0.5 mL, IM, On Call  Continuous: (1)  Sodium Chloride 0.9% 500 mL  500 mL, IV, 10 mL/hr  PRN: (16)  Acetaminophen 325 mg tab  650 mg 2 tab, Oral, Q6H  Acetaminophen 650 mg suppos  650 mg 1 suppository, Rectal, Q6H  Bisacodyl 10 mg suppos  10 mg 1 suppository, Rectal, Daily  Dextrose (glucose) 40% 15 gm/37.5 gm oral gel UD  15 gm, Oral, As Directed PRN  Dextrose  (glucose) 50% 25 gm/50 mL syringe  12.5 gm 25 mL, IV Push, As Directed PRN  Hydrocodone-acetaminophen  mg tab  1 tab, Oral, Q4H  Hydrocodone-acetaminophen 5-325 mg tab  1 tab, Oral, Q4H  Magnesium oxide 400 mg tab  400 mg 1 tab, Oral, BID  magnesium sulfate-sterile water  2 gm 50 mL, IVPB, As Directed PRN  Milk of magnesia 8% 30 mL oral susp UD  30 mL, Oral, BID  Ondansetron 4 mg/2 mL inj SDV  4 mg 2 mL, Slow IV Push, Q6H  Potassium CHLORIDE 20 mEq ER tab  40 mEq 2 tab, Oral, As Directed PRN  Potassium CHLORIDE 20 mEq ER tab  40 mEq 2 tab, Oral, Q4H  potassium CHLORIDE-sterile water  40 mEq 100 mL, IVPB, As Directed PRN  potassium CHLORIDE-sterile water  40 mEq 100 mL, IVPB, As Directed PRN  potassium CHLORIDE-sterile water  20 mEq 50 mL, IVPB, As Directed PRN       Objective   Intake and Output   I & O between:  27-JUN-2019 09:31 TO 28-JUN-2019 09:31  Med Dosing Weight:  87.9  kg   22-JUN-2019  24 Hour Intake:   601.77  ( 6.85 mL/kg )  24 Hour Output:   2780.00           24 Hour Urine/Stool Output:   0.0  24 Hour Balance:   -2178.23           24 Hour Urine Output:   2780.00  ( 1.32 mL/kg/hr )                   Urine Count:  1         VS/Measurements   Vitals between:   27-JUN-2019 09:31:56   TO   28-JUN-2019 09:31:56                   LAST RESULT MINIMUM MAXIMUM  Temperature 36.6 36.6 37.0  Heart Rate 87 80 94  Respiratory Rate 16 13 27  NISBP           99 85 150  NIDBP           55 50 84  NIMBP           67 60 106  SpO2                    99 96 99   , Hemodynamics   Date Range: 06/27/19 09:31 to 06/28/19 09:31  No hemodynamic data found over the previous 24 hours.    General:  Alert and oriented, No acute distress.    Eye:  Normal conjunctiva.    HENT:  Normocephalic.    Neck:  No jugular venous distention.    Respiratory:  Respirations are non-labored, Breath sounds are equal, Using incentive spirometer.         Breath sounds: Diminished breath sounds at bases bilaterally. .   Cardiovascular:  Normal rate,  Regular rhythm, No edema, Sternotomy site is well approximated. .         Arterial pulses: Bilateral, Posterior tibial, Dorsalis pedis, 1+.   Gastrointestinal:  Soft, Non-tender, Normal bowel sounds.    Genitourinary:  No costovertebral angle tenderness.    Musculoskeletal     Normal range of motion.     No deformity.     Integumentary:  Warm, Dry.    Neurologic:  Alert, Oriented, No focal deficits.    Psychiatric:  Cooperative.      Results Review   General results   Interpretation:   Labs between:  27-JUN-2019 09:31 to 28-JUN-2019 09:31  CBC:                 WBC  HgB  Hct  Plt  MCV  RDW   28-JUN-2019 8.9  (L) 9.7  (L) 29.6  158  87.8  14.0   BMP:                 Na  Cl  BUN  Glu   28-JUN-2019 136  99  17  (H) 103                              K  CO2  Cr  Ca                              3.9  30  (H) 1.02  9.1   POC GLU:                 Latest Result  Latest Date  Minimum  Min Date  Maximum  Max Date                             (H) 113  28-JUN-2019 (H) 113  28-JUN-2019 (H) 118  27-JUN-2019                       Chest XR 6/23/19  IMPRESSION:  Subtle left basilar early infiltrate or atelectasis without charlie CHF, lines discussed above, follow-up.    Vascular carotid duplex 6/23/19  IMPRESSION:  No evidence for hemodynamically significant internal carotid stenosis on either side.  Chest  6/24/19 IMPRESSION:  Placement of endotracheal tube.  Sequela of recent median sternotomy.  Angiogram at [place] which showed multivessel disease  LM has 99% diffuse distal narrowing extending into the LAD and circumflex.   50% mid LAD lesion with  competitive filling from the RCA. The first OM also has an eccentric 90% proximal stenosis. The RCA is dominant, has mild       diffuse plaquing throughout, and then competitively fills the LAD via collaterals.       STUDY CONCLUSIONS 2D ECHO 6/26/19  SUMMARY:  1. Left ventricle: The cavity size is normal. Wall thickness is increased.    Systolic function is normal. The estimated ejection  fraction is 50-55%.  2. Right ventricle: The estimated peak pressure is 25mm Hg.  Telemetry: Sinus rhythm rate 85     Impression and Plan   Assessment and Plan:          Course: Arthritis  CVA - Cerebrovascular accident  Chronic pain  Coronary arteriosclerosis  Elevated cholesterol/high density lipoprotein ratio  Hypertension  Hypothyroid   .    Orders     Orders   Pharmacy:  Lasix oral 40 mg tablet (Order Processing): 40 mg, Oral, Daily  Lasix oral 40 mg tablet (Cancel Processing): 40 mg, 1 tab, Oral, BID.    .    This is a 76 year old female with a history of CVA, CAD, hyperlipidemia, hypertension, and hypothyroidism who developed chest pain and ST segment changes during Lexiscan infusion. She underwent an emergent angiogram at [facility]. She was transferred to [facility] with IABP for CABG evaluation.  Plan 6/28/19  CAD  -ST segment changes during stress test.  -s/p emergent angiogram at [facility]. Multivessel disease. IABP placed. Transferred to [facility] for CV surgery.  -s/p CABG x3 with LIMA to LAD SVG to High lateral and OM 6/24/19.  -Right femoral IABP site hematoma resolving.  -On ASA, Statin, beta blocker, diuretic, and ACE.  -Net negative 2.6L yesterday. Will decrease diuretic to daily dose.  -6/26/19 ECHO with EF 50-55%  Hypertension  -Controlled on beta blocker and ACE.   Hyperlipidemia  -Continue statin.  Hypothyroidism  -On synthroid.  -Management per PCP.  History of CVA  -No focal defcits.  -Continue aspirin and statin therapy.   Will update Dr. Aparicio.   pt was independently  seen and examined ,I agree with above findings will follow   yes

## 2021-02-23 NOTE — PRE-ANESTHESIA EVALUATION ADULT - NSANTHOSAYNRD_GEN_A_CORE
No. BLOSSOM screening performed.  STOP BANG Legend: 0-2 = LOW Risk; 3-4 = INTERMEDIATE Risk; 5-8 = HIGH Risk

## 2021-02-23 NOTE — ASU DISCHARGE PLAN (ADULT/PEDIATRIC) - CARE PROVIDER_API CALL
Jasper Howard)  Surgery  [address], [address]  [place] [address]  Phone: [phone]  Fax: [phone]  Follow Up Time: 2 weeks

## 2021-02-23 NOTE — ASU PATIENT PROFILE, ADULT - PMH
Calculus of gallbladder  diagnosed 8/2020; hospitalized 8/9-16. 2020 with acute eric  History of pleural effusion  right side --august 2020  now resolved  HNP (herniated nucleus pulposus), lumbar    Preeclampsia  August 2020

## 2021-02-23 NOTE — ASU PREOP CHECKLIST - ALLERGY BAND ON
Problem: Pressure Injury Actual  Intervention: # Assess, measure, and stage pressure injury q 7 days or if status is deteriorating  Enter today's date indicating that the wound was staged and measured. This will produce a worklist task that will fire 7 days from the date entered to repeat the staging and measurement. Patient seen by wound care RN today for reassessment. Calazine applied to wounds on bilateral buttocks. Bilateral heels elevated off bed with a pillow. Patient tolerated wound care well. 30 minutes spent with patient. no known allergies

## 2021-02-24 PROBLEM — O14.90 UNSPECIFIED PRE-ECLAMPSIA, UNSPECIFIED TRIMESTER: Chronic | Status: ACTIVE | Noted: 2021-02-19

## 2021-02-24 PROBLEM — M51.26 OTHER INTERVERTEBRAL DISC DISPLACEMENT, LUMBAR REGION: Chronic | Status: ACTIVE | Noted: 2021-02-19

## 2021-02-24 PROBLEM — K80.20 CALCULUS OF GALLBLADDER WITHOUT CHOLECYSTITIS WITHOUT OBSTRUCTION: Chronic | Status: ACTIVE | Noted: 2021-02-19

## 2021-02-26 LAB — SURGICAL PATHOLOGY STUDY: SIGNIFICANT CHANGE UP

## 2021-03-01 ENCOUNTER — APPOINTMENT (OUTPATIENT)
Dept: PHYSICAL MEDICINE AND REHAB | Facility: CLINIC | Age: 31
End: 2021-03-01
Payer: MEDICAID

## 2021-03-01 PROCEDURE — 99214 OFFICE O/P EST MOD 30 MIN: CPT

## 2021-03-01 PROCEDURE — 99072 ADDL SUPL MATRL&STAF TM PHE: CPT

## 2021-03-02 NOTE — ASSESSMENT
[FreeTextEntry1] : 31 yo F who presents with low back pain with radiation into her right lower extremity consistent with lumbar radiculopathy, lumbosacral stenosis, and lumbar disc herniation.\par \par Patient with severe low back pain that has been completely refractory to conservative care including PT/HEP, PO NSAIDs, PO muscle relaxants, PO corticosteroids, relative rest, and lumbar DONAVON.  Lumbar DONAVON considered and offered, however given the lack of improvement to date and the onset of weakness, I recommend that patient follow up with Spine Surgery to consider candidacy for surgery.\par \par -MRI lumbar spine w/o contrast reviewed\par -DC tizanidine given elevated LFT's\par -Referral to Spine Surgery to determine candidacy for surgical intervention \par -Start cyclobenzaprine 5 mg tablets, 1-2 tablets PO qHS PRN pain, dispense 30.  Patient denies a history of CHF, liver dysfunction or arrhythmias.   Patient warned about the sedative nature of this medication and recommended not to drive or to handle heavy machinery.\par \par Ismael Ivan MD\par Spine and Sports Medicine\par \par [employer]\par [facility] [place]\par \par

## 2021-03-02 NOTE — HISTORY OF PRESENT ILLNESS
[FreeTextEntry1] : Translation services (Romansh) used for this history and physical examination.\par 3/1/21\par 31 yo F who presents for follow up with low back pain.  Patient s/p right L4-L5, L5-S1 TFESI on 2/16/21 with minimal improvement in her pain.  Patient continues taking gabapentin 300 mg PO QID with minimal improvement in her pain.  Patient s/p PO steroids and PO NSAIDs with minimal improvement.  Patient has also tried tizanidine but without improvement in her pain.  Patient denies new weakness, numbness or paresthesia.  Denies bowel/bladder dysfunction, fevers, chills, weight loss, night pain, or night sweats.\par \par 1/25/21\par 31 yo F who presents for follow up with low back pain.  Patient reports that she continues to have low back pain with radiation into the right lower extremity.  Patient had been referred for lumbar DONAVON and we are currently awaiting insurance approval/ scheduling at [facility].  Patient reports that she is taking gabapentin 300 mg PO QID with no significant improvement.  Patient has been followed by GI for RUQ and epigastric pain with elevated LFT's.  General surgery referral provided and patient recommended to undergo repeat abdominal US.  Patient denies new weakness, numbness or paresthesia.  Denies bowel/bladder dysfunction, fevers, chills, weight loss, night pain, or night sweats.\par \par 1/12/21\par 31 yo F who presents for follow up with low back pain.  Patient underwent MRI lumbar spine w/o contrast showing significant disc herniation at L4-L5 that is asymmetric to the right and impinges on the right L5 nerve root root.  The L5 vertebral body is seen to be partially sacralized.  Patient reports that she has increased the gabapentin from TID to QID with only minimal improvement in her pain.  Patient denies new weakness, numbness or paresthesia.  Denies bowel/bladder dysfunction, fevers, chills, weight loss, night pain, or night sweats.\par \par 1/5/21\par 31 yo F who presents for follow up with low back pain.  Patient last seen on 12/14/20 and started on gabapentin and tizanidine and MRI lumbar spine w/o contrast was ordered.  Patient is taking gabapentin 300 mg PO TID with no improvement.  Tizanidine was denied by her insurance company.  Patient has gotten approval for MRI but has not yet scheduled an appointment for imaging.  Patient denies new weakness, numbness or paresthesia.  Denies bowel/bladder dysfunction, fevers, chills, weight loss, night pain, or night sweats.\par \par 12/14/20\par 31 yo F with PMH cholelithiasis, pre-eclampsia who presents with low back pain.\par \par Onset: One year with progression over the last month.  No inciting events, trauma, or falls.\par Location: lower lumbar spine\par Characteristics: sharp \par Aggravating factors: prolonged sitting, standing, walking\par Alleviating factors: rest\par Radiation: right lower extremity to the right foot.\par Treatments: tylenol, prednisone, mobic with some temporary relief.  No previous physical therapy/HEP as patient with a new infant and has not been able to participate in PT.  \par Severity: 7-9/10\par \par Of note, patient with new onset right lower quadrant abdominal pain.  Pain started 2 weeks ago.  No association with PO intake.  Denies nausea, vomiting, diarrhea, or constipation.  No LUTS.  Denies vaginal bleeding or discharge.  Patient with a history of gallstones but pain at that time was in the RUQ.  \par \par Of note, patient recently gave birth to her son in Aug 2020.  Patient is not breastfeeding.\par \par Diagnostic studies:\par No previous MRI\par No previous EMG/NCS\par \par Patient denies new weakness, numbness or paresthesia.  Patient denies bowel/bladder dysfunction, fevers, chills, weight loss, night pain, or night sweats.\par

## 2021-03-02 NOTE — PHYSICAL EXAM
[FreeTextEntry1] : Gen: NAD\par HEENT: neck supple\par CV: no cyanosis\par Pulm: breathing well on room air\par Abd: soft, non-distended, mild tenderness in RUQ, neg christine, no rebound, no guarding\par Low back: range of motion limited by pain, tenderness to palpation lower lumbar paraspinals and right sciatic notch, +straight leg raise right, neg facet loading, neg FABERE, neg FAIR\par Msk: exam limited by pain\par 5/5 hip flexion B/L, 5/5 knee extension B/L, 5/5 knee flexion B/L, 4/5 R, 5/5 L dorsiflexion, 4/5 R, 5/5 L EHL, 5/5 plantar flexion B/L\par 5/5 shoulder abduction B/L, 5/5 elbow flexion B/L, 5/5 elbow extension B/L, 5/5 wrist extension B/L, 5/5 hand  B/L\par Neuro: sensation intact to light touch in bilateral upper and lower extremities, reflexes 2+ brachioradialis, biceps, triceps bilaterally, reflexes 2+ patella, medial hamstring, achilles bilaterally, negative babinski, negative espinosa\par

## 2021-03-11 ENCOUNTER — APPOINTMENT (OUTPATIENT)
Dept: SURGERY | Facility: CLINIC | Age: 31
End: 2021-03-11
Payer: MEDICAID

## 2021-03-11 VITALS
DIASTOLIC BLOOD PRESSURE: 86 MMHG | HEART RATE: 69 BPM | SYSTOLIC BLOOD PRESSURE: 124 MMHG | TEMPERATURE: 97.2 F | OXYGEN SATURATION: 99 % | RESPIRATION RATE: 16 BRPM

## 2021-03-11 PROCEDURE — 99024 POSTOP FOLLOW-UP VISIT: CPT

## 2021-03-11 NOTE — REASON FOR VISIT
[Post Op: _________] : a [unfilled] post op visit [FreeTextEntry1] : S/P Laparoscopic cholecystectomy with ICG Ductal Imaging

## 2021-03-11 NOTE — ASSESSMENT
[FreeTextEntry1] : I have suggested hydrocortisone cream 1% for her itchy wound.  I will see this patient again on an as-needed basis

## 2021-03-11 NOTE — PHYSICAL EXAM
[de-identified] : Soft and nontender.  The wounds are clean dry and healing well.  An extruded suture was snipped from the lateral port site.

## 2021-03-16 ENCOUNTER — RX RENEWAL (OUTPATIENT)
Age: 31
End: 2021-03-16

## 2021-03-19 ENCOUNTER — APPOINTMENT (OUTPATIENT)
Dept: OBGYN | Facility: CLINIC | Age: 31
End: 2021-03-19

## 2021-04-13 ENCOUNTER — NON-APPOINTMENT (OUTPATIENT)
Age: 31
End: 2021-04-13

## 2021-08-03 ENCOUNTER — APPOINTMENT (OUTPATIENT)
Dept: INTERNAL MEDICINE | Facility: CLINIC | Age: 31
End: 2021-08-03
Payer: MEDICAID

## 2021-08-03 PROCEDURE — 36415 COLL VENOUS BLD VENIPUNCTURE: CPT

## 2021-08-04 LAB
25(OH)D3 SERPL-MCNC: 36.6 NG/ML
ALBUMIN SERPL ELPH-MCNC: 4.4 G/DL
ALP BLD-CCNC: 103 U/L
ALT SERPL-CCNC: 34 U/L
ANION GAP SERPL CALC-SCNC: 13 MMOL/L
AST SERPL-CCNC: 23 U/L
BILIRUB SERPL-MCNC: 0.4 MG/DL
BUN SERPL-MCNC: 12 MG/DL
CALCIUM SERPL-MCNC: 9.4 MG/DL
CHLORIDE SERPL-SCNC: 107 MMOL/L
CHOLEST SERPL-MCNC: 232 MG/DL
CO2 SERPL-SCNC: 18 MMOL/L
CREAT SERPL-MCNC: 0.52 MG/DL
ESTIMATED AVERAGE GLUCOSE: 108 MG/DL
GLUCOSE SERPL-MCNC: 92 MG/DL
HBA1C MFR BLD HPLC: 5.4 %
HDLC SERPL-MCNC: 44 MG/DL
LDLC SERPL CALC-MCNC: 158 MG/DL
LDLC SERPL DIRECT ASSAY-MCNC: 161 MG/DL
NONHDLC SERPL-MCNC: 187 MG/DL
POTASSIUM SERPL-SCNC: 4.3 MMOL/L
PROT SERPL-MCNC: 7.5 G/DL
SODIUM SERPL-SCNC: 137 MMOL/L
T4 FREE SERPL-MCNC: 1.2 NG/DL
TRIGL SERPL-MCNC: 145 MG/DL
TSH SERPL-ACNC: 2.81 UIU/ML

## 2021-08-10 ENCOUNTER — APPOINTMENT (OUTPATIENT)
Dept: INTERNAL MEDICINE | Facility: CLINIC | Age: 31
End: 2021-08-10

## 2021-08-25 ENCOUNTER — APPOINTMENT (OUTPATIENT)
Dept: ORTHOPEDIC SURGERY | Facility: CLINIC | Age: 31
End: 2021-08-25
Payer: MEDICAID

## 2021-08-25 ENCOUNTER — NON-APPOINTMENT (OUTPATIENT)
Age: 31
End: 2021-08-25

## 2021-08-25 VITALS
SYSTOLIC BLOOD PRESSURE: 117 MMHG | WEIGHT: 152 LBS | HEART RATE: 63 BPM | DIASTOLIC BLOOD PRESSURE: 80 MMHG | HEIGHT: 62 IN | BODY MASS INDEX: 27.97 KG/M2

## 2021-08-25 PROCEDURE — 99213 OFFICE O/P EST LOW 20 MIN: CPT

## 2021-08-25 PROCEDURE — 72110 X-RAY EXAM L-2 SPINE 4/>VWS: CPT

## 2021-08-25 PROCEDURE — 72170 X-RAY EXAM OF PELVIS: CPT | Mod: 59

## 2021-08-25 RX ORDER — CYCLOBENZAPRINE HYDROCHLORIDE 5 MG/1
5 TABLET, FILM COATED ORAL
Qty: 60 | Refills: 0 | Status: DISCONTINUED | COMMUNITY
Start: 2021-03-01 | End: 2021-08-25

## 2021-08-25 RX ORDER — GABAPENTIN 300 MG/1
300 CAPSULE ORAL
Qty: 120 | Refills: 0 | Status: DISCONTINUED | COMMUNITY
Start: 2021-01-05 | End: 2021-08-25

## 2021-08-25 NOTE — HISTORY OF PRESENT ILLNESS
[Worsening] : worsening [10] : a current pain level of 10/10 [Walking] : walking [Sitting] : sitting [Daily] : ~He/She~ states the symptoms seem to be occuring daily [Rest] : relieved by rest [___ yrs] : [unfilled] year(s) ago [de-identified] : Patient is here today for evaluation on her right sided low back down right leg into right foot going on for the past 2 1/2 years. Patient was seeing orthopedist went for physical therapy did not help went for mri lumbar spine 1/8/21 and had lumbar epidural injection 2/2021 no relief. 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 Has a 1 yr old child at home, boy, vaginal delivery, epidural. Has also a 10 yr old girl at home. Pain worsened after delivery.\chintan Has had PT, 1 lumbar DONAVON in 2/2021.

## 2021-08-25 NOTE — DISCUSSION/SUMMARY
[Medication Risks Reviewed] : Medication risks reviewed [de-identified] : The patient today presents with symptoms of lumbar radiculopathy in the setting of the disc extrusion at L4-5 on the right side.  She has positive nerve tension signs as well as some weakness of her right foot and ankle with weakness of her EHL and dorsiflexion noted.  She has had a course of epidural steroid injection in the past as well as physical therapy and medication without significant benefit.  Her pain has been progressive and getting worse.  At this time given the duration of symptoms as well as her current presentation recommended to proceed with a microdiscectomy on the right side at all 4 5.  Trial of diclofenac and tizanidine was provided for her today.  She may discontinue gabapentin since that has not been helpful.  We discussed the option of alternative surgery including an epidural steroid injection but given her current clinical presentation as well as duration of symptoms microdiscectomy is recommended.\par \par The patient was advised that based upon their clinical presentation and radiographic findings they meet inclusion criteria for spinal surgery to address their spinal pathology.\par The spectrum of treatments for their spinal condition were reviewed in detail. The goals, alternatives, risks and benefits of spinal surgery were reviewed in detail with the patient.  \par Benefits and risks of operative and nonoperative treatment for the patient's pathology were outlined at length with the patient.  Specifically, those risks are understood to be, but not limited to, bleeding, infection, fracture (both during surgery and after surgery), adjacent level disease, failure to heal (significantly increased by smoking), need for further surgery, failure of instrumentation (if used), recurrence of problem and symptoms, neurovascular injury, dural tears or leaks resulting in spinal fluid leakage and requiring additional invasive and non-invasive treatments, need for additional procedures, possible paralysis resulting in loss of use of arms, legs, bowel and bladder function as well as sexual dysfunction, and anesthetic risks including death. \par Available alternatives to surgery were also discussed with the patient. In addition, the patient further understands that there may be indicated need for somatosensory evoked potential monitoring, real-time EMG monitoring, and motor evoked potential monitoring during the procedure along with placement of needle electrodes. A neuromonitoring service will discuss the risks and benefits separately with the patient.\par The patient also understands that having a surgical procedure and being hospitalized carries risks in addition to the ones described above.\par \par The patient was advised that Dr. Jerez operates as a surgical team with his associate Dr. Palacios and/or with surgical Physician Assistants (PA)\par \par The patient was advised that they will require a medical preoperative risk evaluation by their PCP. Further medical subspecialty clearances such as cardiac may be indicated if felt needed by their PCP.\par \par The patient was advised he/she may call our office after careful consideration of their treatment options and further consultation with any other physician to begin the process of preoperative planning for elective spinal surgery at a time of their choosing. \par The patient verbalized an understanding of the procedure, its indications, and its common potential complications and attendant risks, and is willing to proceed. No guarantees were made with regard to a complete recovery. We will proceed in a timely manner after obtaining medical clearance.

## 2021-08-25 NOTE — CONSULT LETTER
[Dear  ___] : Dear  [unfilled], [Consult Letter:] : I had the pleasure of evaluating your patient, [unfilled]. [FreeTextEntry2] : Alejandro Chaudhary [FreeTextEntry1] : Thank you for this referral. I have enclosed my note for your review. Please feel free to contact my office if you have additional questions regarding this patient.\par \par Regards,\par Kyle Jerez MD, FACS, FAAOS\par \par  of Orthopaedic Surgery\par [employer]\par Spinal Reconstruction Surgery\par Minimally Invasive Spinal Surgery\par [facility]

## 2021-08-25 NOTE — PHYSICAL EXAM
[Antalgic] : antalgic [SLR] : positive straight leg raise [NL] : Motor strength of the left lower extremity was normal [___/5] : right ([unfilled]/5) [Motor Strength Lower Extremities] : right (5/5) [] : Sensory: [L5-RT] : L5 [Knee] : patellar 2+ and symmetric bilaterally [Ankle] : ankle 2+ and symmetric bilaterally [DP] : dorsalis pedis 2+ and symmetric bilaterally [PT] : posterior tibial 2+ and symmetric bilaterally [de-identified] : The pt is awake, alert and oriented to self, place and time, is comfortable and in no acute distress. Inspection of neck, back and lower extremities bilaterally reveals no rashes or ecchymotic lesions.  There is no obvious abnormal spinal curvature in the sagittal and coronal planes. There is no tenderness over the cervical, thoracic or lumbar spine, or the upper and lower extremities musculature.  Right paraspinal lumbar tenderness.  There is no sacroiliac tenderness. No greater trochanteric tenderness bilaterally. No atrophy or abnormal movements noted in the upper or lower extremities. There is no swelling noted in the upper or lower extremities bilaterally. No cervical lymphadenopathy noted anteriorly. No joint laxity noted in the upper and lower extremity joints bilaterally.\par Hip range of motion is degrees internal rotation 30° external rotation without pain. Full range of motion of the shoulders bilaterally with no significant pain\par There is no groin pain with hip internal rotation and a negative ROSEMARY test bilaterally.  [de-identified] : flex to knees, right leg pain, ext 30 degrees less pain [de-identified] : 4 views lumbar spine obtained today demonstrate minimal trunk shift to the left.  Transitional lumbosacral junction is suspected with rudimentary L5-S1 disc.  Loss of disc height is suspected at L4-5.  No dynamic instability between flexion-extension.  No acute fractures.\par \par AP pelvis demonstrates normal appearance of the hips bilaterally.  No acute fractures.  No significant degeneration.\par \par ________________\par \par EXAM: MR SPINE LUMBAR\par \par PROCEDURE DATE: 01/08/2021\par \par INTERPRETATION: LUMBOSACRAL SPINE MRI\par \par CLINICAL INFORMATION: Low back pain\par TECHNIQUE: Multiplanar, multisequence MR imaging was obtained of the lumbosacral spine.\par FINDINGS:\par \par Please note there is a transitional lumbosacral vertebral body. This will be termed L5 for the bursa this dictation. However, imaging of the entire spine would be needed for definitive enumeration.\par \par DISC LEVEL EVALUATION:\par \par L1/L2: Normal\par L2/L3: Normal\par L3/L4: Normal\par L4/L5: There is mild disc degeneration with a moderate to large posterior disc protrusion that is slightly asymmetric to the right and impinges on the descending right L5 nerve root. There is mild central canal stenosis and moderate left paracentral stenosis.\par L5/S1: The L5 vertebral body is partially sacralized with broad bilateral transverse processes. The right-sided transverse process is diffuse the sacrum. The left-sided transverse process articulates with the sacrum.\par \par SPINAL ALIGNMENT: Normal\par DISTAL CORD AND CONUS: The distal cord is normal in appearance. The conus terminates at L1 and is unremarkable.\par SI JOINTS: Normal\par MARROW: There is no bone marrow edema or fracture.\par PARASPINAL MUSCLE AND SOFT TISSUES: Normal\par INTRAABDOMINAL/INTRAPELVIC SOFT TISSUES: Normal\par \par IMPRESSION: Disc degeneration with a moderate to large posterior disc protrusion that is asymmetric to the right at L4-L5 and impinges on the descending right L5 nerve root.\par \par Transitional lumbosacral vertebral body that is termed a sacralized L5 for the purpose of this dictation. Imaging of the entire spine would be needed for definitive enumeration.\par \par LIANA TREVIZO MD; Attending Radiologist\par This document has been electronically signed. Jan 8 2021 4:36PM

## 2021-09-15 ENCOUNTER — APPOINTMENT (OUTPATIENT)
Dept: PHYSICAL MEDICINE AND REHAB | Facility: CLINIC | Age: 31
End: 2021-09-15
Payer: MEDICAID

## 2021-09-15 VITALS
HEART RATE: 68 BPM | SYSTOLIC BLOOD PRESSURE: 118 MMHG | DIASTOLIC BLOOD PRESSURE: 79 MMHG | TEMPERATURE: 97.2 F | OXYGEN SATURATION: 98 %

## 2021-09-15 PROCEDURE — 99214 OFFICE O/P EST MOD 30 MIN: CPT

## 2021-09-15 NOTE — HISTORY OF PRESENT ILLNESS
[FreeTextEntry1] : Translation services (Yoruba) used for this history and physical examination.\par \par 9/15/21\par 29 yo F who presents for follow up with low back pain.  Patient was last seen on 3/1/21 and referred to Spine Surgery.  Patient followed up with Dr. Jerez on 8/25/21 who has recommended that patient proceed with a lumbar microdiscectomy.  Patient would like to proceed with a surgery but reports that she has nobody to take care of her children while she recovers from the surgery.  Her  is in [facility] and is willing to travel to the  to help care for her children.  Patient denies new weakness, numbness or paresthesia.  Denies bowel/bladder dysfunction, saddle anesthesia, fevers, chills, weight loss, night pain, or night sweats. \par \par 3/1/21\par 29 yo F who presents for follow up with low back pain.  Patient s/p right L4-L5, L5-S1 TFESI on 2/16/21 with minimal improvement in her pain.  Patient continues taking gabapentin 300 mg PO QID with minimal improvement in her pain.  Patient s/p PO steroids and PO NSAIDs with minimal improvement.  Patient has also tried tizanidine but without improvement in her pain.  Patient denies new weakness, numbness or paresthesia.  Denies bowel/bladder dysfunction, fevers, chills, weight loss, night pain, or night sweats.\par \par 1/25/21\par 29 yo F who presents for follow up with low back pain.  Patient reports that she continues to have low back pain with radiation into the right lower extremity.  Patient had been referred for lumbar DONAVON and we are currently awaiting insurance approval/ scheduling at [facility].  Patient reports that she is taking gabapentin 300 mg PO QID with no significant improvement.  Patient has been followed by GI for RUQ and epigastric pain with elevated LFT's.  General surgery referral provided and patient recommended to undergo repeat abdominal US.  Patient denies new weakness, numbness or paresthesia.  Denies bowel/bladder dysfunction, fevers, chills, weight loss, night pain, or night sweats.\par \par 1/12/21\par 29 yo F who presents for follow up with low back pain.  Patient underwent MRI lumbar spine w/o contrast showing significant disc herniation at L4-L5 that is asymmetric to the right and impinges on the right L5 nerve root root.  The L5 vertebral body is seen to be partially sacralized.  Patient reports that she has increased the gabapentin from TID to QID with only minimal improvement in her pain.  Patient denies new weakness, numbness or paresthesia.  Denies bowel/bladder dysfunction, fevers, chills, weight loss, night pain, or night sweats.\par \par 1/5/21\par 29 yo F who presents for follow up with low back pain.  Patient last seen on 12/14/20 and started on gabapentin and tizanidine and MRI lumbar spine w/o contrast was ordered.  Patient is taking gabapentin 300 mg PO TID with no improvement.  Tizanidine was denied by her insurance company.  Patient has gotten approval for MRI but has not yet scheduled an appointment for imaging.  Patient denies new weakness, numbness or paresthesia.  Denies bowel/bladder dysfunction, fevers, chills, weight loss, night pain, or night sweats.\par \par 12/14/20\par 29 yo F with PMH cholelithiasis, pre-eclampsia who presents with low back pain.\par \par Onset: One year with progression over the last month.  No inciting events, trauma, or falls.\par Location: lower lumbar spine\par Characteristics: sharp \par Aggravating factors: prolonged sitting, standing, walking\par Alleviating factors: rest\par Radiation: right lower extremity to the right foot.\par Treatments: tylenol, prednisone, mobic with some temporary relief.  No previous physical therapy/HEP as patient with a new infant and has not been able to participate in PT.  \par Severity: 7-9/10\par \par Of note, patient with new onset right lower quadrant abdominal pain.  Pain started 2 weeks ago.  No association with PO intake.  Denies nausea, vomiting, diarrhea, or constipation.  No LUTS.  Denies vaginal bleeding or discharge.  Patient with a history of gallstones but pain at that time was in the RUQ.  \par \par Of note, patient recently gave birth to her son in Aug 2020.  Patient is not breastfeeding.\par \par Diagnostic studies:\par No previous MRI\par No previous EMG/NCS\par \par Patient denies new weakness, numbness or paresthesia.  Patient denies bowel/bladder dysfunction, fevers, chills, weight loss, night pain, or night sweats.\par

## 2021-09-15 NOTE — PHYSICAL EXAM
[FreeTextEntry1] : Gen: NAD\par HEENT: neck supple\par CV: no cyanosis\par Pulm: breathing well on room air\par Abd: soft, non-distended, mild tenderness in RUQ, neg christine, no rebound, no guarding\par Low back: range of motion limited by pain, tenderness to palpation lower lumbar paraspinals and right sciatic notch, +straight leg raise right, neg facet loading, neg Villagran's, neg FABERE, neg FAIR\par Msk: exam limited by pain\par 5/5 hip flexion B/L, 5/5 knee extension B/L, 5/5 knee flexion B/L, 4/5 R, 5/5 L dorsiflexion, 4/5 R, 5/5 L EHL, 5/5 plantar flexion B/L\par 5/5 shoulder abduction B/L, 5/5 elbow flexion B/L, 5/5 elbow extension B/L, 5/5 wrist extension B/L, 5/5 hand  B/L\par Neuro: sensation intact to light touch in bilateral upper and lower extremities, reflexes 2+ brachioradialis, biceps, triceps bilaterally, reflexes 2+ patella, medial hamstring, achilles bilaterally, negative babinski, negative espinosa\par

## 2021-09-15 NOTE — ASSESSMENT
[FreeTextEntry1] : 29 yo F who presents with low back pain with radiation into her right lower extremity consistent with lumbar radiculopathy, lumbosacral stenosis, and lumbar disc herniation.\par \par Patient with severe low back pain that has been completely refractory to conservative care including PT/HEP, PO NSAIDs, PO muscle relaxants, PO corticosteroids, relative rest, and lumbar DONAVON.  Lumbar DONAVON considered and offered, however given the lack of improvement to date and the onset of weakness, I previously recommend that patient follow up with Spine Surgery to consider candidacy for surgery.  \par \par Patient has been seen by Dr. Jerez who has recommended a lumbar microdiscectomy, however patient does not have anyone with her to care for her child while she recovers.\par \par -MRI lumbar spine w/o contrast reviewed\par -DC tizanidine given elevated LFT's\par -Follow up with Dr. Jerez for surgery.\par -Letter written recommending that accommodations be made for her  to travel to the [place]\par -RTC following surgery.\par \par Ismael Ivan MD\par Spine and Sports Medicine\par \par [employer]\par At [facility]\par \par

## 2021-11-09 ENCOUNTER — APPOINTMENT (OUTPATIENT)
Dept: INTERNAL MEDICINE | Facility: CLINIC | Age: 31
End: 2021-11-09
Payer: MEDICAID

## 2021-11-09 VITALS
SYSTOLIC BLOOD PRESSURE: 114 MMHG | DIASTOLIC BLOOD PRESSURE: 78 MMHG | WEIGHT: 160 LBS | HEIGHT: 62 IN | BODY MASS INDEX: 29.44 KG/M2 | TEMPERATURE: 97 F

## 2021-11-09 PROCEDURE — 90682 RIV4 VACC RECOMBINANT DNA IM: CPT

## 2021-11-09 PROCEDURE — G0008: CPT

## 2021-11-09 PROCEDURE — 99214 OFFICE O/P EST MOD 30 MIN: CPT | Mod: 25

## 2021-11-09 NOTE — HISTORY OF PRESENT ILLNESS
[FreeTextEntry1] : Hyperlipidemia, high glucose, subclinical hypothyroid, and elevated LFT [de-identified] : no complaints\par

## 2021-11-13 LAB
ALBUMIN SERPL ELPH-MCNC: 4.1 G/DL
ALP BLD-CCNC: 109 U/L
ALT SERPL-CCNC: 37 U/L
ANION GAP SERPL CALC-SCNC: 13 MMOL/L
AST SERPL-CCNC: 22 U/L
BILIRUB SERPL-MCNC: 0.6 MG/DL
BUN SERPL-MCNC: 12 MG/DL
CALCIUM SERPL-MCNC: 9.4 MG/DL
CHLORIDE SERPL-SCNC: 103 MMOL/L
CHOLEST SERPL-MCNC: 218 MG/DL
CO2 SERPL-SCNC: 20 MMOL/L
CREAT SERPL-MCNC: 0.48 MG/DL
CRP SERPL-MCNC: <3 MG/L
ESTIMATED AVERAGE GLUCOSE: 108 MG/DL
GLUCOSE SERPL-MCNC: 86 MG/DL
HBA1C MFR BLD HPLC: 5.4 %
HDLC SERPL-MCNC: 45 MG/DL
LDLC SERPL CALC-MCNC: 131 MG/DL
NONHDLC SERPL-MCNC: 173 MG/DL
POTASSIUM SERPL-SCNC: 3.8 MMOL/L
PROT SERPL-MCNC: 7.5 G/DL
SODIUM SERPL-SCNC: 136 MMOL/L
T4 FREE SERPL-MCNC: 1.1 NG/DL
TRIGL SERPL-MCNC: 208 MG/DL
TSH SERPL-ACNC: 3.05 UIU/ML

## 2021-11-23 ENCOUNTER — APPOINTMENT (OUTPATIENT)
Dept: DERMATOLOGY | Facility: CLINIC | Age: 31
End: 2021-11-23
Payer: MEDICAID

## 2021-11-23 VITALS — WEIGHT: 156.53 LBS | BODY MASS INDEX: 28.63 KG/M2

## 2021-11-23 DIAGNOSIS — L70.9 ACNE, UNSPECIFIED: ICD-10-CM

## 2021-11-23 PROCEDURE — 99204 OFFICE O/P NEW MOD 45 MIN: CPT

## 2021-11-23 PROCEDURE — 99214 OFFICE O/P EST MOD 30 MIN: CPT

## 2022-04-12 ENCOUNTER — NON-APPOINTMENT (OUTPATIENT)
Age: 32
End: 2022-04-12

## 2022-04-12 ENCOUNTER — EMERGENCY (EMERGENCY)
Facility: HOSPITAL | Age: 32
LOS: 1 days | Discharge: HOME | End: 2022-04-12
Admitting: STUDENT IN AN ORGANIZED HEALTH CARE EDUCATION/TRAINING PROGRAM
Payer: MEDICAID

## 2022-04-12 VITALS
HEART RATE: 90 BPM | RESPIRATION RATE: 16 BRPM | SYSTOLIC BLOOD PRESSURE: 122 MMHG | OXYGEN SATURATION: 100 % | HEIGHT: 62 IN | TEMPERATURE: 98 F | DIASTOLIC BLOOD PRESSURE: 83 MMHG

## 2022-04-12 VITALS
TEMPERATURE: 97 F | RESPIRATION RATE: 16 BRPM | SYSTOLIC BLOOD PRESSURE: 109 MMHG | OXYGEN SATURATION: 100 % | DIASTOLIC BLOOD PRESSURE: 70 MMHG | HEART RATE: 62 BPM

## 2022-04-12 DIAGNOSIS — Z90.49 ACQUIRED ABSENCE OF OTHER SPECIFIED PARTS OF DIGESTIVE TRACT: Chronic | ICD-10-CM

## 2022-04-12 PROCEDURE — 99284 EMERGENCY DEPT VISIT MOD MDM: CPT

## 2022-04-12 RX ORDER — IBUPROFEN 200 MG
1 TABLET ORAL
Qty: 28 | Refills: 0
Start: 2022-04-12 | End: 2022-04-18

## 2022-04-12 RX ORDER — OXYCODONE HYDROCHLORIDE 5 MG/1
5 TABLET ORAL ONCE
Refills: 0 | Status: DISCONTINUED | OUTPATIENT
Start: 2022-04-12 | End: 2022-04-12

## 2022-04-12 RX ORDER — LIDOCAINE 4 G/100G
1 CREAM TOPICAL ONCE
Refills: 0 | Status: COMPLETED | OUTPATIENT
Start: 2022-04-12 | End: 2022-04-12

## 2022-04-12 RX ORDER — ONDANSETRON 8 MG/1
4 TABLET, FILM COATED ORAL ONCE
Refills: 0 | Status: COMPLETED | OUTPATIENT
Start: 2022-04-12 | End: 2022-04-12

## 2022-04-12 RX ORDER — ACETAMINOPHEN 500 MG
650 TABLET ORAL ONCE
Refills: 0 | Status: COMPLETED | OUTPATIENT
Start: 2022-04-12 | End: 2022-04-12

## 2022-04-12 RX ORDER — KETOROLAC TROMETHAMINE 30 MG/ML
30 SYRINGE (ML) INJECTION ONCE
Refills: 0 | Status: DISCONTINUED | OUTPATIENT
Start: 2022-04-12 | End: 2022-04-12

## 2022-04-12 RX ORDER — DIAZEPAM 5 MG
1 TABLET ORAL
Qty: 12 | Refills: 0
Start: 2022-04-12 | End: 2022-04-15

## 2022-04-12 RX ORDER — DIAZEPAM 5 MG
5 TABLET ORAL ONCE
Refills: 0 | Status: DISCONTINUED | OUTPATIENT
Start: 2022-04-12 | End: 2022-04-12

## 2022-04-12 RX ADMIN — Medication 650 MILLIGRAM(S): at 14:02

## 2022-04-12 RX ADMIN — OXYCODONE HYDROCHLORIDE 5 MILLIGRAM(S): 5 TABLET ORAL at 15:28

## 2022-04-12 RX ADMIN — Medication 5 MILLIGRAM(S): at 14:03

## 2022-04-12 RX ADMIN — LIDOCAINE 1 PATCH: 4 CREAM TOPICAL at 14:05

## 2022-04-12 RX ADMIN — ONDANSETRON 4 MILLIGRAM(S): 8 TABLET, FILM COATED ORAL at 15:28

## 2022-04-12 RX ADMIN — Medication 30 MILLIGRAM(S): at 16:34

## 2022-04-12 NOTE — ED ADULT TRIAGE NOTE - CHIEF COMPLAINT QUOTE
Pt presents to ED ambulatory from home with c/o lower back pain radiating across abdomen and bilateral lower extremity. Pt was seen at urgent care yesterday with no relief. Pt denies fall or trauma to area.

## 2022-04-12 NOTE — ED PROVIDER NOTE - CLINICAL SUMMARY MEDICAL DECISION MAKING FREE TEXT BOX
32 yo F with PMH of herniated disc, chronic lower back w/ radiculopathy presents to ER c/o worsening of b/l lower back pain x3 weeks. well appearing female. there is no fever. Likely disc herniation. no red flags. Pt can't tolerate NSAIDS. Plan: pain control w/ lidocaine patch, warm compress, valium, tylenol, and ortho referral.

## 2022-04-12 NOTE — ED ADULT NURSE NOTE - OBJECTIVE STATEMENT
A&Ox4. ambulatory. c/o lower back pain, radiating to left and right thigh. NAD. pt denies chest pain, dizziness, n/v/d, HA, dizziness, pain upon urination, weakness. respirations are even and un labored. abd is soft and non tender. skin intact. medication given as ordered. safety precautions maintained.

## 2022-04-12 NOTE — ED PROVIDER NOTE - PATIENT PORTAL LINK FT
You can access the FollowMyHealth Patient Portal offered by [facility] by registering at the following website: [email or web address]. By joining [email or web address] FollowMyHealth portal, you will also be able to view your health information using other applications (apps) compatible with our system.

## 2022-04-12 NOTE — ED PROVIDER NOTE - NSFOLLOWUPINSTRUCTIONS_ED_ALL_ED_FT
Follow with your PMD within 48-72 hours.  Rest, no heavy lifting.  Warm compresses to area. Recommend Ortho consult to discuss possible MRI vs Physical Therapy- referral list provided.  Light walking. Take Tylenol 650mg (Two 325 mg pills) every 4-6 hours as needed for pain. Valium 5mg every 8 hours as needed for muscle spasm- caution drowsiness/do not drive. Any worsening pain, weakness, numbness, bowel or urinary incontinence or new concerning symptoms return to the Emergency Department. Follow with your PMD within 48-72 hours.  Rest, no heavy lifting.  Warm compresses to area. Recommend Ortho consult to discuss possible MRI vs Physical Therapy- referral list provided.  Light walking.     Take Tylenol 650mg (Two 325 mg pills) every 4-6 hours as needed for pain.     Take Motrin 600 mg every 6-8 hours as needed for moderate pain -- take with food.     Valium 5mg every 8 hours as needed for muscle spasm- caution drowsiness/do not drive.   **DO NOT COMBINE WITH OTHER MUSCLE RELAXER like (Methocarbamol).      Any worsening pain, weakness, numbness, bowel or urinary incontinence or new concerning symptoms return to the Emergency Department.

## 2022-04-12 NOTE — ED PROVIDER NOTE - PROGRESS NOTE DETAILS
PA LALA: pt states her pain is unchanged, having dizziness & nausea. Informed pt that Valium can causes her symptoms of dizziness & nausea. Pt wants stronger medication, informed pt that oxycodone can causes dizziness & nausea. Pt agrees to Oxycodone to help control her pain. Will continue to monitor and reassess. PA LALA: Patient reassessed, sitting comfortably in chair in NAD, denies any complaints. States feeling better, symptoms improved after Toradol. Pt is medically stable for discharge and follow up with PMD & ortho. The patient was given verbal and written discharge instructions. Specifically, instructions when to return to the ED and when to seek follow-up from their pcp was discussed. Any specialty follow-up was discussed, including how to make an appointment.  Instructions were discussed in simple, plain language and was understood by the patient. The patient understands that should their symptoms worsen or any new symptoms arise, they should return to the ED immediately for further evaluation. All pt's questions were answered. Patient verbalizes understanding.

## 2022-04-12 NOTE — ED PROVIDER NOTE - OBJECTIVE STATEMENT
32 yo F with PMH of herniated disc, chronic lower back w/ radiculopathy presents to ER c/o worsening of b/l lower back pain x3 weeks. Pt states she has same lower back pain for 2.5 yrs, had epidural injection & physical therapy in therapy; refused surgery. Reports sharp pain, 9/10, radiates to b/l thigh a/w numbness b/l, worse with movements & ambulation. States she was seen at urgent care 2 days ago, given Naproxen and Robaxin, took it once w/o improvement. Admits the pain medication upset her stomach. Admits she takes Gabapentin 3 times a day. Denies hx of malignancy, unexplained weight loss, fever, rigors, malaise, recent infection, hx of IVDU, saddle anesthesia/perianal sensory loss, decreased rectal tone, urinary retention, inability to control urine from overflow, weakness, recent travel, or any other complaints.

## 2022-04-12 NOTE — ED PROVIDER NOTE - EXTREMITY EXAM
limited ROM of b/l lower extremities due to back pain, no foot drop b/l. sensation intact b/l; strength 5/5 b/l.

## 2022-04-12 NOTE — ED PROVIDER NOTE - NSICDXPASTMEDICALHX_GEN_ALL_CORE_FT
PAST MEDICAL HISTORY:  Calculus of gallbladder diagnosed 8/2020; hospitalized 8/9-16. 2020 with acute eric    History of pleural effusion right side --august 2020  now resolved    HNP (herniated nucleus pulposus), lumbar     Preeclampsia August 2020

## 2022-04-13 ENCOUNTER — TRANSCRIPTION ENCOUNTER (OUTPATIENT)
Age: 32
End: 2022-04-13

## 2022-04-13 ENCOUNTER — INPATIENT (INPATIENT)
Facility: HOSPITAL | Age: 32
LOS: 4 days | Discharge: HOME | End: 2022-04-18
Attending: NEUROLOGICAL SURGERY | Admitting: NEUROLOGICAL SURGERY
Payer: MEDICAID

## 2022-04-13 VITALS
RESPIRATION RATE: 18 BRPM | TEMPERATURE: 98 F | HEIGHT: 62 IN | OXYGEN SATURATION: 98 % | DIASTOLIC BLOOD PRESSURE: 65 MMHG | SYSTOLIC BLOOD PRESSURE: 132 MMHG | HEART RATE: 85 BPM | WEIGHT: 158.73 LBS

## 2022-04-13 DIAGNOSIS — M51.26 OTHER INTERVERTEBRAL DISC DISPLACEMENT, LUMBAR REGION: ICD-10-CM

## 2022-04-13 DIAGNOSIS — Z29.9 ENCOUNTER FOR PROPHYLACTIC MEASURES, UNSPECIFIED: ICD-10-CM

## 2022-04-13 DIAGNOSIS — M54.9 DORSALGIA, UNSPECIFIED: ICD-10-CM

## 2022-04-13 DIAGNOSIS — E78.5 HYPERLIPIDEMIA, UNSPECIFIED: ICD-10-CM

## 2022-04-13 DIAGNOSIS — Z90.49 ACQUIRED ABSENCE OF OTHER SPECIFIED PARTS OF DIGESTIVE TRACT: Chronic | ICD-10-CM

## 2022-04-13 LAB
ALBUMIN SERPL ELPH-MCNC: 3.9 G/DL — SIGNIFICANT CHANGE UP (ref 3.3–5)
ALP SERPL-CCNC: 106 U/L — SIGNIFICANT CHANGE UP (ref 40–120)
ALT FLD-CCNC: 39 U/L — HIGH (ref 4–33)
ANION GAP SERPL CALC-SCNC: 12 MMOL/L — SIGNIFICANT CHANGE UP (ref 7–14)
AST SERPL-CCNC: 20 U/L — SIGNIFICANT CHANGE UP (ref 4–32)
BASOPHILS # BLD AUTO: 0.02 K/UL — SIGNIFICANT CHANGE UP (ref 0–0.2)
BASOPHILS NFR BLD AUTO: 0.4 % — SIGNIFICANT CHANGE UP (ref 0–2)
BILIRUB SERPL-MCNC: 0.4 MG/DL — SIGNIFICANT CHANGE UP (ref 0.2–1.2)
BUN SERPL-MCNC: 19 MG/DL — SIGNIFICANT CHANGE UP (ref 7–23)
CALCIUM SERPL-MCNC: 9.1 MG/DL — SIGNIFICANT CHANGE UP (ref 8.4–10.5)
CHLORIDE SERPL-SCNC: 106 MMOL/L — SIGNIFICANT CHANGE UP (ref 98–107)
CO2 SERPL-SCNC: 20 MMOL/L — LOW (ref 22–31)
CREAT SERPL-MCNC: 0.55 MG/DL — SIGNIFICANT CHANGE UP (ref 0.5–1.3)
EGFR: 127 ML/MIN/1.73M2 — SIGNIFICANT CHANGE UP
EOSINOPHIL # BLD AUTO: 0.11 K/UL — SIGNIFICANT CHANGE UP (ref 0–0.5)
EOSINOPHIL NFR BLD AUTO: 2.1 % — SIGNIFICANT CHANGE UP (ref 0–6)
GLUCOSE SERPL-MCNC: 93 MG/DL — SIGNIFICANT CHANGE UP (ref 70–99)
HCT VFR BLD CALC: 34.2 % — LOW (ref 34.5–45)
HGB BLD-MCNC: 11.6 G/DL — SIGNIFICANT CHANGE UP (ref 11.5–15.5)
IANC: 2.85 K/UL — SIGNIFICANT CHANGE UP (ref 1.8–7.4)
IMM GRANULOCYTES NFR BLD AUTO: 0.4 % — SIGNIFICANT CHANGE UP (ref 0–1.5)
LYMPHOCYTES # BLD AUTO: 1.94 K/UL — SIGNIFICANT CHANGE UP (ref 1–3.3)
LYMPHOCYTES # BLD AUTO: 37 % — SIGNIFICANT CHANGE UP (ref 13–44)
MCHC RBC-ENTMCNC: 29.1 PG — SIGNIFICANT CHANGE UP (ref 27–34)
MCHC RBC-ENTMCNC: 33.9 GM/DL — SIGNIFICANT CHANGE UP (ref 32–36)
MCV RBC AUTO: 85.9 FL — SIGNIFICANT CHANGE UP (ref 80–100)
MONOCYTES # BLD AUTO: 0.31 K/UL — SIGNIFICANT CHANGE UP (ref 0–0.9)
MONOCYTES NFR BLD AUTO: 5.9 % — SIGNIFICANT CHANGE UP (ref 2–14)
NEUTROPHILS # BLD AUTO: 2.85 K/UL — SIGNIFICANT CHANGE UP (ref 1.8–7.4)
NEUTROPHILS NFR BLD AUTO: 54.2 % — SIGNIFICANT CHANGE UP (ref 43–77)
NRBC # BLD: 0 /100 WBCS — SIGNIFICANT CHANGE UP
NRBC # FLD: 0 K/UL — SIGNIFICANT CHANGE UP
PLATELET # BLD AUTO: 219 K/UL — SIGNIFICANT CHANGE UP (ref 150–400)
POTASSIUM SERPL-MCNC: 4.1 MMOL/L — SIGNIFICANT CHANGE UP (ref 3.5–5.3)
POTASSIUM SERPL-SCNC: 4.1 MMOL/L — SIGNIFICANT CHANGE UP (ref 3.5–5.3)
PROT SERPL-MCNC: 6.9 G/DL — SIGNIFICANT CHANGE UP (ref 6–8.3)
RBC # BLD: 3.98 M/UL — SIGNIFICANT CHANGE UP (ref 3.8–5.2)
RBC # FLD: 12.7 % — SIGNIFICANT CHANGE UP (ref 10.3–14.5)
SARS-COV-2 RNA SPEC QL NAA+PROBE: SIGNIFICANT CHANGE UP
SODIUM SERPL-SCNC: 138 MMOL/L — SIGNIFICANT CHANGE UP (ref 135–145)
WBC # BLD: 5.25 K/UL — SIGNIFICANT CHANGE UP (ref 3.8–10.5)
WBC # FLD AUTO: 5.25 K/UL — SIGNIFICANT CHANGE UP (ref 3.8–10.5)

## 2022-04-13 PROCEDURE — 99285 EMERGENCY DEPT VISIT HI MDM: CPT

## 2022-04-13 PROCEDURE — 99223 1ST HOSP IP/OBS HIGH 75: CPT

## 2022-04-13 RX ORDER — DIAZEPAM 5 MG
5 TABLET ORAL ONCE
Refills: 0 | Status: DISCONTINUED | OUTPATIENT
Start: 2022-04-13 | End: 2022-04-13

## 2022-04-13 RX ORDER — KETOROLAC TROMETHAMINE 30 MG/ML
15 SYRINGE (ML) INJECTION ONCE
Refills: 0 | Status: DISCONTINUED | OUTPATIENT
Start: 2022-04-13 | End: 2022-04-13

## 2022-04-13 RX ORDER — HYDROMORPHONE HYDROCHLORIDE 2 MG/ML
1 INJECTION INTRAMUSCULAR; INTRAVENOUS; SUBCUTANEOUS EVERY 6 HOURS
Refills: 0 | Status: DISCONTINUED | OUTPATIENT
Start: 2022-04-13 | End: 2022-04-13

## 2022-04-13 RX ORDER — HYDROMORPHONE HYDROCHLORIDE 2 MG/ML
1 INJECTION INTRAMUSCULAR; INTRAVENOUS; SUBCUTANEOUS EVERY 6 HOURS
Refills: 0 | Status: DISCONTINUED | OUTPATIENT
Start: 2022-04-13 | End: 2022-04-14

## 2022-04-13 RX ORDER — KETOROLAC TROMETHAMINE 30 MG/ML
30 SYRINGE (ML) INJECTION EVERY 6 HOURS
Refills: 0 | Status: DISCONTINUED | OUTPATIENT
Start: 2022-04-13 | End: 2022-04-13

## 2022-04-13 RX ORDER — ACETAMINOPHEN 500 MG
650 TABLET ORAL EVERY 6 HOURS
Refills: 0 | Status: DISCONTINUED | OUTPATIENT
Start: 2022-04-13 | End: 2022-04-14

## 2022-04-13 RX ORDER — IBUPROFEN 200 MG
400 TABLET ORAL THREE TIMES A DAY
Refills: 0 | Status: COMPLETED | OUTPATIENT
Start: 2022-04-13 | End: 2022-04-14

## 2022-04-13 RX ORDER — GABAPENTIN 400 MG/1
1 CAPSULE ORAL
Qty: 0 | Refills: 0 | DISCHARGE

## 2022-04-13 RX ORDER — LIDOCAINE 4 G/100G
1 CREAM TOPICAL DAILY
Refills: 0 | Status: DISCONTINUED | OUTPATIENT
Start: 2022-04-13 | End: 2022-04-18

## 2022-04-13 RX ORDER — HYDROMORPHONE HYDROCHLORIDE 2 MG/ML
0.5 INJECTION INTRAMUSCULAR; INTRAVENOUS; SUBCUTANEOUS ONCE
Refills: 0 | Status: DISCONTINUED | OUTPATIENT
Start: 2022-04-13 | End: 2022-04-13

## 2022-04-13 RX ORDER — DIAZEPAM 5 MG
10 TABLET ORAL ONCE
Refills: 0 | Status: DISCONTINUED | OUTPATIENT
Start: 2022-04-13 | End: 2022-04-13

## 2022-04-13 RX ORDER — DEXAMETHASONE 0.5 MG/5ML
4 ELIXIR ORAL ONCE
Refills: 0 | Status: COMPLETED | OUTPATIENT
Start: 2022-04-13 | End: 2022-04-13

## 2022-04-13 RX ORDER — ENOXAPARIN SODIUM 100 MG/ML
40 INJECTION SUBCUTANEOUS EVERY 24 HOURS
Refills: 0 | Status: DISCONTINUED | OUTPATIENT
Start: 2022-04-13 | End: 2022-04-14

## 2022-04-13 RX ORDER — OXYCODONE AND ACETAMINOPHEN 5; 325 MG/1; MG/1
1 TABLET ORAL ONCE
Refills: 0 | Status: DISCONTINUED | OUTPATIENT
Start: 2022-04-13 | End: 2022-04-13

## 2022-04-13 RX ADMIN — OXYCODONE AND ACETAMINOPHEN 1 TABLET(S): 5; 325 TABLET ORAL at 11:53

## 2022-04-13 RX ADMIN — HYDROMORPHONE HYDROCHLORIDE 0.5 MILLIGRAM(S): 2 INJECTION INTRAMUSCULAR; INTRAVENOUS; SUBCUTANEOUS at 13:22

## 2022-04-13 RX ADMIN — Medication 400 MILLIGRAM(S): at 21:44

## 2022-04-13 RX ADMIN — Medication 10 MILLIGRAM(S): at 11:54

## 2022-04-13 RX ADMIN — Medication 400 MILLIGRAM(S): at 22:14

## 2022-04-13 RX ADMIN — LIDOCAINE 1 PATCH: 4 CREAM TOPICAL at 19:29

## 2022-04-13 RX ADMIN — HYDROMORPHONE HYDROCHLORIDE 0.5 MILLIGRAM(S): 2 INJECTION INTRAMUSCULAR; INTRAVENOUS; SUBCUTANEOUS at 13:35

## 2022-04-13 RX ADMIN — Medication 650 MILLIGRAM(S): at 21:01

## 2022-04-13 RX ADMIN — LIDOCAINE 1 PATCH: 4 CREAM TOPICAL at 17:03

## 2022-04-13 RX ADMIN — Medication 4 MILLIGRAM(S): at 13:21

## 2022-04-13 RX ADMIN — Medication 650 MILLIGRAM(S): at 20:31

## 2022-04-13 RX ADMIN — Medication 15 MILLIGRAM(S): at 11:54

## 2022-04-13 RX ADMIN — ENOXAPARIN SODIUM 40 MILLIGRAM(S): 100 INJECTION SUBCUTANEOUS at 21:44

## 2022-04-13 NOTE — ED PROVIDER NOTE - OBJECTIVE STATEMENT
this is a 31 y.o female with PMhx of herniated discs, chronic lower back pain, presented to the ED complaining of having worsening back pain for the past several days, patient was here yesterday where she received medications-however did not have much improvements, patient was seen at the  a few days prior received medications however no relief. Patient hasn't had any numbness or tingling in the extremities denies having any acute trauma, denies having any weight loss, fever, recent infections, IVDU, saddle anesthesias, urinary or bladder incontinence. Paitent came today because pain became unbareable. Patient had multiple injections, epidurals, physical therapies however did not have any improvement.

## 2022-04-13 NOTE — H&P ADULT - ASSESSMENT
31F PMH L5 herniation following up outpt w/ PMNR presented to ED due to uncontrolled back pain and inability to ambulate after being dc'd from ED. Outpt MRI in 1/2022 showed L5 herniation compressing on nerve root. Pt had no alarming signs of saddle anesthesia. Admitted for pain control w/ pain management consult placed.

## 2022-04-13 NOTE — H&P ADULT - ATTENDING COMMENTS
Patient seen and examined, care plan discussed with house staff as above.    Ms. Becerril is a 31yoF p/w backpain, now unable to walk due to severity, worse on the R side. Hx notable for herniated disc of L4/5, diagnosed by MR Jan 2021, Also w post partum complications from cesarian section in Aug 2020 requiring hospitalization w pleural effusions, severe epigastric pain, HTN, lap cholecystectomy 2021.    #Severe Back pain 2/2 herniated disc of L4/5   First became problematic 2 years ago. Surgery was deferred in outpatient setting back in Jan due to patient not having childcare perioperatively since [place]  [place]. Prior imaging: "Disc degeneration with a moderate to large posterior disc protrusion that is asymmetric to the right at L4-L5 and impinges on the descending right L5 nerve root." On MR Jan 2021. Treatments thus far include PT and epidural injections.  - Tylenol/ibuprofen alternating, scheduled  - IV toradol q 8 hrs prn for moderate pain, IV dilaudid 1mg q 4 hrs for severe pain  - Can also trial flexeril as adjunct therapy  - Bowel regimen  - Failed gabapentin, epidural injections in outpt setting per review of prior records  - Pain mgmt consult, PMR consult  - Ortho consult in am for re-eval for surgical candidacy.  - Check a UA.

## 2022-04-13 NOTE — PATIENT PROFILE ADULT - FALL HARM RISK - HARM RISK INTERVENTIONS

## 2022-04-13 NOTE — ED ADULT NURSE NOTE - INTERVENTIONS DEFINITIONS
[place] to call system/Call bell, personal items and telephone within reach/Instruct patient to call for assistance/Non-slip footwear when patient is off stretcher/Physically safe environment: no spills, clutter or unnecessary equipment/Stretcher in lowest position, wheels locked, appropriate side rails in place/Monitor gait and stability/Reinforce activity limits and safety measures with patient and family

## 2022-04-13 NOTE — H&P ADULT - NSHPREVIEWOFSYSTEMS_GEN_ALL_CORE
CONSTITUTIONAL: pain  EYES/ENT: No visual changes;  No vertigo or throat pain   NECK: No pain or stiffness  RESPIRATORY: No cough, wheezing, hemoptysis; No shortness of breath  CARDIOVASCULAR: No chest pain, +palpitations when she has pain  GASTROINTESTINAL: No abdominal or epigastric pain. No nausea, vomiting, or hematemesis; No diarrhea or constipation. No melena or hematochezia.  GENITOURINARY: No dysuria, frequency or hematuria  NEUROLOGICAL: LE weakness  SKIN: No itching, burning, rashes, or lesions

## 2022-04-13 NOTE — H&P ADULT - PROBLEM SELECTOR PLAN 1
- outpt MRI showed L5 nerve root compression  - pt deferred surgery due to family issues  - pain control: toradol vs dilaudid depending on the severity of the pain  - consult: pain management, PMNR, and ortho if necessary  - consider another MRI to track progression - outpt MRI showed L5 nerve root compression  - pt deferred surgery due to family issues  - pain control: standing ibuprofen and tylenol, IV dilaudid PRN for severe pain  - consult: pain management, PMNR, and ortho if necessary  - consider another MRI to track progression

## 2022-04-13 NOTE — ED ADULT TRIAGE NOTE - HAVE YOU HAD A FIRST COVID-19 BOOSTER?
No [No Acute Distress] : no acute distress [Well Nourished] : well nourished [Well Developed] : well developed [Well-Appearing] : well-appearing [Normal Sclera/Conjunctiva] : normal sclera/conjunctiva [PERRL] : pupils equal round and reactive to light [EOMI] : extraocular movements intact [Normal Outer Ear/Nose] : the outer ears and nose were normal in appearance [Normal Oropharynx] : the oropharynx was normal [Supple] : supple [No Lymphadenopathy] : no lymphadenopathy [Thyroid Normal, No Nodules] : the thyroid was normal and there were no nodules present [No Respiratory Distress] : no respiratory distress  [Clear to Auscultation] : lungs were clear to auscultation bilaterally [No Accessory Muscle Use] : no accessory muscle use [Normal Rate] : normal rate  [Regular Rhythm] : with a regular rhythm [Normal S1, S2] : normal S1 and S2 [No Murmur] : no murmur heard [No Carotid Bruits] : no carotid bruits [No Abdominal Bruit] : a ~M bruit was not heard ~T in the abdomen [No Varicosities] : no varicosities [No Extremity Clubbing/Cyanosis] : no extremity clubbing/cyanosis [No Palpable Aorta] : no palpable aorta [Soft] : abdomen soft [Non Tender] : non-tender [Non-distended] : non-distended [Normal Bowel Sounds] : normal bowel sounds [Normal Posterior Cervical Nodes] : no posterior cervical lymphadenopathy [Normal Anterior Cervical Nodes] : no anterior cervical lymphadenopathy [No CVA Tenderness] : no CVA  tenderness [No Spinal Tenderness] : no spinal tenderness [No Joint Swelling] : no joint swelling [Grossly Normal Strength/Tone] : grossly normal strength/tone [No Rash] : no rash [Normal Gait] : normal gait [Coordination Grossly Intact] : coordination grossly intact [No Focal Deficits] : no focal deficits [Deep Tendon Reflexes (DTR)] : deep tendon reflexes were 2+ and symmetric [Normal Affect] : the affect was normal [Normal Insight/Judgement] : insight and judgment were intact

## 2022-04-13 NOTE — H&P ADULT - HISTORY OF PRESENT ILLNESS
31F PMH L5 herniation following up outpt w/ PMNR presented to ED due to uncontrolled back pain and inability to ambulate after being dc'd from ED. Pt received IM toradol which improved her pain. She was a regular f/u w/ Dr. Pinedo and was recommended a spine surgery, but she refused the surgery for now due to [place]  [place] and she had difficulty taking care of her son if she is going through surgery. She denied any saddle anesthesia, any urinary retention, or bowel movement incontinence. She reported severe 10/10 back pain radiating to the R leg. At her baseline she can ambulate when walking close to a wall.     In the ED, she received multi-modal pain regimen, including steroids, valium, dilaudid, none of which helped with the pain. Yesterday in the ED she received IM toradol which helped her with the pain. MRI spine in 1/2022 showed L5 nerve root compression.

## 2022-04-13 NOTE — ED ADULT NURSE NOTE - ED STAT RN HANDOFF DETAILS
Patient is A&Ox4, aware of plan of care and in NAD, has room available.  Report given to nurse on floor via phone.  Patient awaiting transportation.

## 2022-04-13 NOTE — DISCHARGE NOTE PROVIDER - NSDCFUSCHEDAPPT_GEN_ALL_CORE_FT
[facility] ; 04/15/2022 ; [facility] [address]  [facility] ; 04/26/2022 ; [facility] [address] [facility] ; 05/03/2022 ; [facility] [address] [facility] ; 04/26/2022 ; [facility] [address] [facility]  [facility] ; 05/03/2022 ; [facility] [address] [facility]

## 2022-04-13 NOTE — PHARMACOTHERAPY INTERVENTION NOTE - COMMENTS
Medication history is complete. Medication list updated in Outpatient Medication Record (OMR). Please call spectra [phone] if you have any questions.

## 2022-04-13 NOTE — ED ADULT NURSE NOTE - OBJECTIVE STATEMENT
pt received in rm 9 AAO x 3. pt reports lower back pain and inability to ambulate for the past 3 days. pt states she was seen here yesterday and treated however pain returned. pt denies sob, chest pain, n/v/d, fevers, chills. respirations even and unlabored. 20g iv placed to right ac.

## 2022-04-13 NOTE — DISCHARGE NOTE PROVIDER - NSDCCPTREATMENT_GEN_ALL_CORE_FT
PRINCIPAL PROCEDURE  Procedure: MRI lumbar spine w contrast  Findings and Treatment:        PRINCIPAL PROCEDURE  Procedure: MRI lumbar spine w contrast  Findings and Treatment: FINDINGS:  SPINAL SEGMENTATION: There is a transitional lumbosacral vertebra which   will be considered a partly sacralized L5. This conforms to prior exam.   Please confirm all levels before any planned intervention.  SPINAL ALIGNMENT: Normal lumbar lordosis is maintained. No   spondylolisthesis.  MARROW: Vertebral body heights are maintained. No acute fracture. No   lytic or blastic lesion. Slight increased T2 is signal anterior aspect of   the superior endplate of L4 question related to a limbus vertebra  DISTAL CORD AND CONUS: The visualized portion of the thoracic spinal cord   is unremarkable. The conus medullaris terminates at L1-2. The cauda   equina is unremarkable.  PARASPINAL MUSCLE AND SOFT TISSUES: Unremarkable.  INTRAABDOMINAL/INTRAPELVIC SOFT TISSUES: Unremarkable.  DISC LEVEL EVALUATION:  T12/L1: No spinal canal or neural foraminal stenosis.  L1/L2: No spinal canal or neural foraminal stenosis.  L2/L3: No spinal canal or neural foraminal stenosis.  L3/L4: Mild bulgingof the disc annulus with mild facet hypertrophy   without significant canal and neural foramina narrowing..  L4/L5: There is a large central/right paracentral disc extrusion slightly   larger from prior exam. There is mild to moderate central canal narrowing   unchanged to slightly increased. There is compression and displacement of   the right L5 nerve root. There is mass effect on the left L5 nerve root.  L5/S1: No spinal canal or neural foraminal stenosis.  IMPRESSION:  Large central/right paracentral disc extrusion slightly larger from prior   exam

## 2022-04-13 NOTE — DISCHARGE NOTE PROVIDER - NSDCCPCAREPLAN_GEN_ALL_CORE_FT
PRINCIPAL DISCHARGE DIAGNOSIS  Diagnosis: Back pain  Assessment and Plan of Treatment: You suffered from severe back pain and we found that the reason for your back pain was your impinged nerve that were diagnosed previously. We obtained an MRI of the spine showed ________. You received ________ and your pain improved. We recommended that you ____________ for your surgery. If you experienced any difficulty urinating or having bowel movement, or if you could not feel your anal area when you wipe yourself in the toilet please return to the ED immediately.       PRINCIPAL DISCHARGE DIAGNOSIS  Diagnosis: Back pain  Assessment and Plan of Treatment: You suffered from severe back pain and we found that the reason for your back pain was your impinged nerve that were diagnosed previously. We obtained an MRI of the spine showed worsening of the compression on L5/S1 nerve root. You received surgical repair while being hospitalized and __________. If you experienced any difficulty urinating or having bowel movement, or if you could not feel your anal area when you wipe yourself in the toilet please return to the ED immediately.       PRINCIPAL DISCHARGE DIAGNOSIS  Diagnosis: HNP (herniated nucleus pulposus), lumbar  Assessment and Plan of Treatment:

## 2022-04-13 NOTE — ED PROVIDER NOTE - CLINICAL SUMMARY MEDICAL DECISION MAKING FREE TEXT BOX
this is a 31 y.o female with PMhx of herniated discs, chronic lower back pain, presented to the ED complaining of having worsening back pain for the past several days. back pain-meds, labs, likely admit due to uncontrolled pain

## 2022-04-13 NOTE — ED PROVIDER NOTE - NS ED ATTENDING STATEMENT MOD
This was a shared visit with the DYLLAN. I reviewed and verified the documentation and independently performed the documented:

## 2022-04-13 NOTE — H&P ADULT - NSHPPHYSICALEXAM_GEN_ALL_CORE
VITALS:   T(C): 36.5 (04-13-22 @ 16:44), Max: 36.7 (04-13-22 @ 11:13)  HR: 81 (04-13-22 @ 16:44) (74 - 85)  BP: 119/78 (04-13-22 @ 16:44) (110/74 - 132/65)  RR: 18 (04-13-22 @ 16:44) (16 - 18)  SpO2: 100% (04-13-22 @ 16:44) (98% - 100%)    GENERAL: in mild distress due to pain  HEAD:  Atraumatic, normocephalic  EYES: EOMI, PERRLA, conjunctiva and sclera clear  ENT: Moist mucous membranes  NECK: Supple, no JVD  HEART: Regular rate and rhythm, no murmurs, rubs, or gallops  LUNGS: Unlabored respirations.  Clear to auscultation bilaterally, no crackles, wheezing, or rhonchi  ABDOMEN: obese abdomen limits the exam, Soft, nontender, nondistended, +BS  EXTREMITIES: 2+ peripheral pulses bilaterally. No clubbing, cyanosis, or edema; lower back pain b/l, +ve straight leg raise test, 4-/5 RLE strength, 4+/5 LLE strength, 2+ knee and ankle reflexes  NERVOUS SYSTEM:  A&Ox3, no focal deficits   SKIN: No rashes or lesions

## 2022-04-13 NOTE — ED PROVIDER NOTE - ATTENDING CONTRIBUTION TO CARE
DR. LEDESMA, ATTENDING MD-  I personally saw the patient with the PA and performed a substantive portion of the visit including all aspects of the medical decision making.    30 y/o female lbp for few days.  No red flags.  Seen yesterday but progressively worsening pain.  Difficulty walking d/t pain.  Obtain cbc bmp give pain med will need to pass gait trial prior to dc.

## 2022-04-13 NOTE — ED ADULT NURSE REASSESSMENT NOTE - NS ED NURSE REASSESS COMMENT FT1
PT received in RW from intake. PT A&Ox4 c/O Lower back pain. No UCG present: PT educated on need for UCG prior to med administration. PT states she is not pregnant, would not like to provide urine sample due to limitations in ambulating due to pain and is requesting medication to be administered. Pt admitted and awaiting bed assignment.

## 2022-04-13 NOTE — DISCHARGE NOTE PROVIDER - CARE PROVIDER_API CALL
Mary Butcher)  Neurosurgery  [address], [address]  [place] [address]  Phone: [phone]  Fax: [phone]  Follow Up Time:

## 2022-04-13 NOTE — H&P ADULT - NSICDXPASTMEDICALHX_GEN_ALL_CORE_FT
PAST MEDICAL HISTORY:  Calculus of gallbladder diagnosed 8/2020; hospitalized 8/9-16. 2020 with acute eric    HNP (herniated nucleus pulposus), lumbar     Preeclampsia August 2020

## 2022-04-13 NOTE — DISCHARGE NOTE PROVIDER - HOSPITAL COURSE
31F PMH L5 herniation following up outpt w/ PMNR presented to ED due to uncontrolled back pain and inability to ambulate after being dc'd from ED. Pt received IM toradol which improved her pain. She was a regular f/u w/ Dr. Pinedo and was recommended a spine surgery, but she refused the surgery for now due [place]  [place] and she had difficulty taking care of her son if she is going through surgery. She denied any saddle anesthesia, any urinary retention, or bowel movement incontinence. She reported severe 10/10 back pain radiating to the R leg. At her baseline she can ambulate when walking close to a wall.     Pt received multiple pain regimen and ________ works the best for her. PMNR, pain management, and orthopedics were consulted, recommending _________. Pt received ___________ and was stable for dc on _________. 31F PMH L5 herniation following up outpt w/ PMNR presented to ED due to uncontrolled back pain and inability to ambulate after being dc'd from ED. Pt received IM toradol which improved her pain. She was a regular f/u w/ Dr. Pinedo and was recommended a spine surgery, but she refused the surgery for now due to her  [place] and she had difficulty taking care of her son if she is going through surgery. She denied any saddle anesthesia, any urinary retention, or bowel movement incontinence. She reported severe 10/10 back pain radiating to the R leg. At her baseline she can ambulate when walking close to a wall.     Pt received multiple pain regimen, including steroid, oxycodone, ibuprofen, gabapentin, and tylenol. PMNR, pain management, and neurosurgery were recommended. Pt underwent surgical repair of her herniated disc while in hospital. 31F PMH- HNP L4-L5 , was following up outpt w/ PMNR presented to ED due to uncontrolled back pain and inability to ambulate after being dc'd from ED. Pt received IM toradol which improved her pain. She was a regular f/u w/ Dr. Pinedo and was recommended a spine surgery, but she refused the surgery for now due to her  [place] and she had difficulty taking care of her son if she is going through surgery. She denied any saddle anesthesia, any urinary retention, or bowel movement incontinence. She reported severe 10/10 back pain radiating to the R leg. At her baseline she can ambulate when walking close to a wall.     Pt received multiple pain regimen, including steroid, oxycodone, ibuprofen, gabapentin, and tylenol. PMNR, pain management, and neurosurgery were recommended.  MRI of the lumbar spine showed Large central/right paracentral disc extrusion slightly larger from prior exam  Pt underwent lumbar laminectomy, discectomy of L4-5 on 4/15/22.  She tolerated procedure well, post op radiculopathy improved.  patient was seen by physical therapy who recommended outpatient PT.  She is tolerating regular diet, and is stable for discharge home.

## 2022-04-13 NOTE — DISCHARGE NOTE PROVIDER - NSDCMRMEDTOKEN_GEN_ALL_CORE_FT
cyclobenzaprine 5 mg oral tablet: 1 tab(s) orally 3 times a day, As needed, Muscle Spasm  gabapentin 100 mg oral capsule: 2 cap(s) orally every 12 hours  oxyCODONE 5 mg oral tablet: 1 tab(s) orally every 6 hours, As needed, Moderate Pain (4 - 6)  pantoprazole 40 mg oral delayed release tablet: 1 tab(s) orally once a day (before a meal)  polyethylene glycol 3350 oral powder for reconstitution: 17 gram(s) orally once a day  senna oral tablet: 2 tab(s) orally once a day (at bedtime)   cyclobenzaprine 5 mg oral tablet: 1 tab(s) orally 3 times a day, As needed, Muscle Spasm  pantoprazole 40 mg oral delayed release tablet: 1 tab(s) orally once a day (before a meal)  polyethylene glycol 3350 oral powder for reconstitution: 17 gram(s) orally once a day  senna oral tablet: 2 tab(s) orally once a day (at bedtime)   cyclobenzaprine 5 mg oral tablet: 1 tab(s) orally 3 times a day, As needed, Muscle Spasm MDD:3 tabs  dexamethasone 1 mg oral tablet: 3 tab(s)PO once a day at 8PMt, then [id number] day, [id number], then [id number], then 1tabPOdaily, then d/c MDD:6 tabs  gabapentin 100 mg oral capsule: 2 cap(s) orally every 12 hours MDD:4 tabs  oxyCODONE 5 mg oral tablet: 1 tab(s) orally every 6 hours, As needed, Moderate Pain (4 - 6) MDD:4 tabs  pantoprazole 40 mg oral delayed release tablet: 1 tab(s) orally once a day (before a meal) MDD:1 tab  polyethylene glycol 3350 oral powder for reconstitution: 17 gram(s) orally once a day  senna oral tablet: 2 tab(s) orally once a day (at bedtime)

## 2022-04-13 NOTE — H&P ADULT - NSHPLABSRESULTS_GEN_ALL_CORE
04-13    138  |  106  |  19  ----------------------------<  93  4.1   |  20<L>  |  0.55    Ca    9.1      13 Apr 2022 12:18    TPro  6.9  /  Alb  3.9  /  TBili  0.4  /  DBili  x   /  AST  20  /  ALT  39<H>  /  AlkPhos  106  04-13                                              11.6   5.25  )-----------( 219      ( 13 Apr 2022 12:18 )             34.2     CAPILLARY BLOOD GLUCOSE

## 2022-04-14 ENCOUNTER — TRANSCRIPTION ENCOUNTER (OUTPATIENT)
Age: 32
End: 2022-04-14

## 2022-04-14 LAB
ANION GAP SERPL CALC-SCNC: 15 MMOL/L — HIGH (ref 7–14)
APPEARANCE UR: ABNORMAL
APPEARANCE UR: CLEAR — SIGNIFICANT CHANGE UP
APTT BLD: 27.3 SEC — SIGNIFICANT CHANGE UP (ref 27–36.3)
BACTERIA # UR AUTO: ABNORMAL
BACTERIA # UR AUTO: NEGATIVE — SIGNIFICANT CHANGE UP
BILIRUB UR-MCNC: NEGATIVE — SIGNIFICANT CHANGE UP
BILIRUB UR-MCNC: NEGATIVE — SIGNIFICANT CHANGE UP
BLD GP AB SCN SERPL QL: NEGATIVE — SIGNIFICANT CHANGE UP
BUN SERPL-MCNC: 16 MG/DL — SIGNIFICANT CHANGE UP (ref 7–23)
CALCIUM SERPL-MCNC: 8.9 MG/DL — SIGNIFICANT CHANGE UP (ref 8.4–10.5)
CHLORIDE SERPL-SCNC: 105 MMOL/L — SIGNIFICANT CHANGE UP (ref 98–107)
CHOLEST SERPL-MCNC: 251 MG/DL — HIGH
CO2 SERPL-SCNC: 19 MMOL/L — LOW (ref 22–31)
COLOR SPEC: SIGNIFICANT CHANGE UP
COLOR SPEC: YELLOW — SIGNIFICANT CHANGE UP
COMMENT - URINE: SIGNIFICANT CHANGE UP
CREAT SERPL-MCNC: 0.47 MG/DL — LOW (ref 0.5–1.3)
DIFF PNL FLD: ABNORMAL
DIFF PNL FLD: ABNORMAL
EGFR: 132 ML/MIN/1.73M2 — SIGNIFICANT CHANGE UP
EPI CELLS # UR: 1 /HPF — SIGNIFICANT CHANGE UP (ref 0–5)
EPI CELLS # UR: ABNORMAL
GLUCOSE SERPL-MCNC: 92 MG/DL — SIGNIFICANT CHANGE UP (ref 70–99)
GLUCOSE UR QL: NEGATIVE — SIGNIFICANT CHANGE UP
GLUCOSE UR QL: NEGATIVE — SIGNIFICANT CHANGE UP
HCG SERPL-ACNC: <5 MIU/ML — SIGNIFICANT CHANGE UP
HCT VFR BLD CALC: 34.2 % — LOW (ref 34.5–45)
HDLC SERPL-MCNC: 45 MG/DL — LOW
HGB BLD-MCNC: 11.2 G/DL — LOW (ref 11.5–15.5)
HYALINE CASTS # UR AUTO: 1 /LPF — SIGNIFICANT CHANGE UP (ref 0–7)
INR BLD: 1 RATIO — SIGNIFICANT CHANGE UP (ref 0.88–1.16)
KETONES UR-MCNC: NEGATIVE — SIGNIFICANT CHANGE UP
KETONES UR-MCNC: NEGATIVE — SIGNIFICANT CHANGE UP
LEUKOCYTE ESTERASE UR-ACNC: ABNORMAL
LEUKOCYTE ESTERASE UR-ACNC: NEGATIVE — SIGNIFICANT CHANGE UP
LIPID PNL WITH DIRECT LDL SERPL: 173 MG/DL — HIGH
MCHC RBC-ENTMCNC: 28.4 PG — SIGNIFICANT CHANGE UP (ref 27–34)
MCHC RBC-ENTMCNC: 32.7 GM/DL — SIGNIFICANT CHANGE UP (ref 32–36)
MCV RBC AUTO: 86.6 FL — SIGNIFICANT CHANGE UP (ref 80–100)
NITRITE UR-MCNC: NEGATIVE — SIGNIFICANT CHANGE UP
NITRITE UR-MCNC: NEGATIVE — SIGNIFICANT CHANGE UP
NON HDL CHOLESTEROL: 206 MG/DL — HIGH
NRBC # BLD: 0 /100 WBCS — SIGNIFICANT CHANGE UP
NRBC # FLD: 0 K/UL — SIGNIFICANT CHANGE UP
PH UR: 6 — SIGNIFICANT CHANGE UP (ref 5–8)
PH UR: 6.5 — SIGNIFICANT CHANGE UP (ref 5–8)
PLATELET # BLD AUTO: 267 K/UL — SIGNIFICANT CHANGE UP (ref 150–400)
POTASSIUM SERPL-MCNC: 3.7 MMOL/L — SIGNIFICANT CHANGE UP (ref 3.5–5.3)
POTASSIUM SERPL-SCNC: 3.7 MMOL/L — SIGNIFICANT CHANGE UP (ref 3.5–5.3)
PROT UR-MCNC: ABNORMAL
PROT UR-MCNC: NEGATIVE — SIGNIFICANT CHANGE UP
PROTHROM AB SERPL-ACNC: 11.6 SEC — SIGNIFICANT CHANGE UP (ref 10.5–13.4)
RBC # BLD: 3.95 M/UL — SIGNIFICANT CHANGE UP (ref 3.8–5.2)
RBC # FLD: 12.7 % — SIGNIFICANT CHANGE UP (ref 10.3–14.5)
RBC CASTS # UR COMP ASSIST: 1 /HPF — SIGNIFICANT CHANGE UP (ref 0–4)
RBC CASTS # UR COMP ASSIST: 3 /HPF — SIGNIFICANT CHANGE UP (ref 0–4)
RH IG SCN BLD-IMP: POSITIVE — SIGNIFICANT CHANGE UP
SODIUM SERPL-SCNC: 139 MMOL/L — SIGNIFICANT CHANGE UP (ref 135–145)
SP GR SPEC: 1.01 — SIGNIFICANT CHANGE UP (ref 1–1.05)
SP GR SPEC: 1.04 — SIGNIFICANT CHANGE UP (ref 1–1.05)
TRIGL SERPL-MCNC: 167 MG/DL — HIGH
UROBILINOGEN FLD QL: SIGNIFICANT CHANGE UP
UROBILINOGEN FLD QL: SIGNIFICANT CHANGE UP
WBC # BLD: 9.72 K/UL — SIGNIFICANT CHANGE UP (ref 3.8–10.5)
WBC # FLD AUTO: 9.72 K/UL — SIGNIFICANT CHANGE UP (ref 3.8–10.5)
WBC UR QL: 1 /HPF — SIGNIFICANT CHANGE UP (ref 0–5)
WBC UR QL: 45 /HPF — HIGH (ref 0–5)

## 2022-04-14 PROCEDURE — 72149 MRI LUMBAR SPINE W/DYE: CPT | Mod: 26

## 2022-04-14 PROCEDURE — 99222 1ST HOSP IP/OBS MODERATE 55: CPT

## 2022-04-14 PROCEDURE — 99233 SBSQ HOSP IP/OBS HIGH 50: CPT

## 2022-04-14 RX ORDER — ACETAMINOPHEN 500 MG
500 TABLET ORAL EVERY 6 HOURS
Refills: 0 | Status: COMPLETED | OUTPATIENT
Start: 2022-04-14 | End: 2022-04-16

## 2022-04-14 RX ORDER — OXYCODONE HYDROCHLORIDE 5 MG/1
5 TABLET ORAL EVERY 6 HOURS
Refills: 0 | Status: DISCONTINUED | OUTPATIENT
Start: 2022-04-14 | End: 2022-04-18

## 2022-04-14 RX ORDER — CYCLOBENZAPRINE HYDROCHLORIDE 10 MG/1
5 TABLET, FILM COATED ORAL THREE TIMES A DAY
Refills: 0 | Status: DISCONTINUED | OUTPATIENT
Start: 2022-04-14 | End: 2022-04-18

## 2022-04-14 RX ORDER — ACETAMINOPHEN 500 MG
650 TABLET ORAL EVERY 6 HOURS
Refills: 0 | Status: DISCONTINUED | OUTPATIENT
Start: 2022-04-14 | End: 2022-04-14

## 2022-04-14 RX ORDER — PANTOPRAZOLE SODIUM 20 MG/1
40 TABLET, DELAYED RELEASE ORAL
Refills: 0 | Status: DISCONTINUED | OUTPATIENT
Start: 2022-04-14 | End: 2022-04-18

## 2022-04-14 RX ORDER — DEXAMETHASONE 0.5 MG/5ML
4 ELIXIR ORAL EVERY 8 HOURS
Refills: 0 | Status: DISCONTINUED | OUTPATIENT
Start: 2022-04-14 | End: 2022-04-15

## 2022-04-14 RX ORDER — GABAPENTIN 400 MG/1
200 CAPSULE ORAL EVERY 12 HOURS
Refills: 0 | Status: DISCONTINUED | OUTPATIENT
Start: 2022-04-14 | End: 2022-04-18

## 2022-04-14 RX ORDER — SODIUM CHLORIDE 9 MG/ML
1000 INJECTION INTRAMUSCULAR; INTRAVENOUS; SUBCUTANEOUS
Refills: 0 | Status: DISCONTINUED | OUTPATIENT
Start: 2022-04-14 | End: 2022-04-16

## 2022-04-14 RX ADMIN — HYDROMORPHONE HYDROCHLORIDE 1 MILLIGRAM(S): 2 INJECTION INTRAMUSCULAR; INTRAVENOUS; SUBCUTANEOUS at 14:01

## 2022-04-14 RX ADMIN — LIDOCAINE 1 PATCH: 4 CREAM TOPICAL at 18:32

## 2022-04-14 RX ADMIN — Medication 400 MILLIGRAM(S): at 16:48

## 2022-04-14 RX ADMIN — Medication 650 MILLIGRAM(S): at 02:49

## 2022-04-14 RX ADMIN — PANTOPRAZOLE SODIUM 40 MILLIGRAM(S): 20 TABLET, DELAYED RELEASE ORAL at 15:48

## 2022-04-14 RX ADMIN — Medication 400 MILLIGRAM(S): at 06:56

## 2022-04-14 RX ADMIN — CYCLOBENZAPRINE HYDROCHLORIDE 5 MILLIGRAM(S): 10 TABLET, FILM COATED ORAL at 12:56

## 2022-04-14 RX ADMIN — Medication 650 MILLIGRAM(S): at 17:01

## 2022-04-14 RX ADMIN — SODIUM CHLORIDE 75 MILLILITER(S): 9 INJECTION INTRAMUSCULAR; INTRAVENOUS; SUBCUTANEOUS at 23:54

## 2022-04-14 RX ADMIN — Medication 500 MILLIGRAM(S): at 23:54

## 2022-04-14 RX ADMIN — Medication 400 MILLIGRAM(S): at 06:26

## 2022-04-14 RX ADMIN — Medication 650 MILLIGRAM(S): at 09:40

## 2022-04-14 RX ADMIN — Medication 400 MILLIGRAM(S): at 15:48

## 2022-04-14 RX ADMIN — LIDOCAINE 1 PATCH: 4 CREAM TOPICAL at 06:00

## 2022-04-14 RX ADMIN — Medication 650 MILLIGRAM(S): at 08:42

## 2022-04-14 RX ADMIN — LIDOCAINE 1 PATCH: 4 CREAM TOPICAL at 15:48

## 2022-04-14 RX ADMIN — Medication 650 MILLIGRAM(S): at 02:19

## 2022-04-14 RX ADMIN — Medication 4 MILLIGRAM(S): at 22:58

## 2022-04-14 RX ADMIN — HYDROMORPHONE HYDROCHLORIDE 1 MILLIGRAM(S): 2 INJECTION INTRAMUSCULAR; INTRAVENOUS; SUBCUTANEOUS at 14:10

## 2022-04-14 NOTE — PROGRESS NOTE ADULT - SUBJECTIVE AND OBJECTIVE BOX
PROGRESS NOTE:     Patient is a 31y old  Female who presents with a chief complaint of Back Pain (13 Apr 2022 17:50)      SUBJECTIVE / OVERNIGHT EVENTS:    ADDITIONAL REVIEW OF SYSTEMS:    MEDICATIONS  (STANDING):  acetaminophen     Tablet .. 650 milliGRAM(s) Oral every 6 hours  enoxaparin Injectable 40 milliGRAM(s) SubCutaneous every 24 hours  ibuprofen  Tablet. 400 milliGRAM(s) Oral three times a day  lidocaine   4% Patch 1 Patch Transdermal daily    MEDICATIONS  (PRN):  HYDROmorphone  Injectable 1 milliGRAM(s) IV Push every 6 hours PRN Severe Pain (7 - 10)      CAPILLARY BLOOD GLUCOSE        I&O's Summary      PHYSICAL EXAM:  Vital Signs Last 24 Hrs  T(C): 36.7 (14 Apr 2022 07:11), Max: 36.8 (13 Apr 2022 19:12)  T(F): 98.1 (14 Apr 2022 07:11), Max: 98.2 (13 Apr 2022 19:12)  HR: 74 (14 Apr 2022 07:11) (74 - 85)  BP: 102/76 (14 Apr 2022 07:11) (102/76 - 132/65)  BP(mean): --  RR: 18 (14 Apr 2022 07:11) (16 - 18)  SpO2: 100% (14 Apr 2022 07:11) (98% - 100%)    CONSTITUTIONAL: NAD, well-developed  HEENT: normal conjunctiva, PEERLA  RESPIRATORY: Normal respiratory effort; lungs are clear to auscultation bilaterally  CARDIOVASCULAR: Regular rate and rhythm, normal S1 and S2, no murmur/rub/gallop;   ABDOMEN: No tenderness to palpation at all four quadrants, +BS  MUSCULOSKELETAL no clubbing or cyanosis of digits; no joint swelling or tenderness to palpation  EXTREMITIES No lower extremity edema; Peripheral pulses are 2+ bilaterally  SKIN: well-perfused, no dry skin    LABS:                        11.6   5.25  )-----------( 219      ( 13 Apr 2022 12:18 )             34.2     04-13    138  |  106  |  19  ----------------------------<  93  4.1   |  20<L>  |  0.55    Ca    9.1      13 Apr 2022 12:18    TPro  6.9  /  Alb  3.9  /  TBili  0.4  /  DBili  x   /  AST  20  /  ALT  39<H>  /  AlkPhos  106  04-13                RADIOLOGY & ADDITIONAL TESTS:  Results Reviewed:   Imaging Personally Reviewed:  Electrocardiogram Personally Reviewed:    COORDINATION OF CARE:  Care Discussed with Consultants/Other Providers [Y/N]:  Prior or Outpatient Records Reviewed [Y/N]:

## 2022-04-14 NOTE — PHYSICAL THERAPY INITIAL EVALUATION ADULT - PERTINENT HX OF CURRENT PROBLEM, REHAB EVAL
31 year old female PMH L5 herniation following up outpatient w/ PMNR presented to ED due to uncontrolled back pain and inability to ambulate after being dc'd from ED.

## 2022-04-14 NOTE — CONSULT NOTE ADULT - SUBJECTIVE AND OBJECTIVE BOX
NEUROSURGERY CONSULT    Consulted for: L4-5 disc protrusion    HPI:   30 yo F PMH with known L4-5 disc herniation following outpatient with PM&R presented to ED due to uncontrolled back pain and inability to ambulate after being dc'd from ED. She follows with Dr. Pinedo and was recommended a spine surgery, but she refused the surgery for now [place] and she had difficulty taking care of her son if she is going through surgery. She denied any saddle anesthesia, any urinary retention, or bowel movement incontinence. She reported severe 10/10 back pain radiating to the R leg. At her baseline she can ambulate when walking close to a wall. In the ED, she received multi-modal pain regimen, including steroids, valium, dilaudid, none of which helped with the pain. MRI spine in 1/2022 showed L4-5 protruded disc with L5 nerve root compression.    RADIOLOGY:   < from: MR Lumbar Spine No Cont (01.08.21 @ 13:03) >  FINDINGS:    Please note there is a transitional lumbosacral vertebral body. This will be termed L5 for the bursa this dictation. However, imaging of the entire spine would be needed for definitive enumeration.    DISC LEVEL EVALUATION:    L1/L2: Normal  L2/L3: Normal  L3/L4: Normal  L4/L5: There is mild disc degeneration with a moderate to large posterior disc protrusion that is slightly asymmetric to the right and impinges on the descending right L5 nerve root. There is mild central canal stenosis and moderate left paracentral stenosis.  L5/S1: The L5 vertebral body is partially sacralized with broad bilateral transverse processes. The right-sided transverse process is diffuse the sacrum. The left-sided transverse process articulates with the sacrum.    SPINAL ALIGNMENT: Normal  DISTAL CORD AND CONUS: The distal cord is normal in appearance. The conus terminates at L1 and is unremarkable.  SI JOINTS: Normal  MARROW: There is no bone marrow edema or fracture.  PARASPINAL MUSCLE AND SOFT TISSUES: Normal  INTRAABDOMINAL/INTRAPELVIC SOFT TISSUES: Normal    IMPRESSION: Disc degeneration with a moderate to large posterior disc protrusion that is asymmetric to the right at L4-L5 and impinges on the descending right L5 nerve root.    Transitional lumbosacral vertebral body that is termed a sacralized L5 for the purpose of this dictation. Imaging of the entire spine would be needed for definitive enumeration.    MEDS:  acetaminophen     Tablet .. 650 milliGRAM(s) Oral every 6 hours  cyclobenzaprine 5 milliGRAM(s) Oral three times a day PRN  enoxaparin Injectable 40 milliGRAM(s) SubCutaneous every 24 hours  HYDROmorphone  Injectable 1 milliGRAM(s) IV Push every 6 hours PRN  ibuprofen  Tablet. 400 milliGRAM(s) Oral three times a day  lidocaine   4% Patch 1 Patch Transdermal daily    PHYSICAL EXAM:  Vital Signs Last 24 Hrs  T(C): 36.7 (14 Apr 2022 07:11), Max: 36.8 (13 Apr 2022 19:12)  T(F): 98.1 (14 Apr 2022 07:11), Max: 98.2 (13 Apr 2022 19:12)  HR: 74 (14 Apr 2022 07:11) (74 - 81)  BP: 102/76 (14 Apr 2022 07:11) (102/76 - 124/78)  BP(mean): --  RR: 18 (14 Apr 2022 07:11) (16 - 18)  SpO2: 100% (14 Apr 2022 07:11) (98% - 100%)    LABS:                        11.2   9.72  )-----------( 267      ( 14 Apr 2022 07:35 )             34.2     04-14    139  |  105  |  16  ----------------------------<  92  3.7   |  19<L>  |  0.47<L>    Ca    8.9      14 Apr 2022 07:35    TPro  6.9  /  Alb  3.9  /  TBili  0.4  /  DBili  x   /  AST  20  /  ALT  39<H>  /  AlkPhos  106  04-13               NEUROSURGERY CONSULT    Consulted for: L4-5 disc protrusion    HPI:   30 yo F PMH with known L4-5 disc herniation following outpatient with PM&R presented to ED due to uncontrolled back pain and inability to ambulate after being dc'd from ED. She follows with Dr. Pinedo and was recommended a spine surgery, but she refused the surgery for now due to [place]  [place] and she had difficulty taking care of her son if she is going through surgery. She denied any saddle anesthesia, any urinary retention, or bowel movement incontinence. She reported severe 10/10 back pain radiating to the R leg. At her baseline she can ambulate when walking close to a wall. In the ED, she received multi-modal pain regimen, including steroids, valium, dilaudid, none of which helped with the pain. MRI spine in 1/2022 showed L4-5 protruded disc with L5 nerve root compression.    RADIOLOGY:   < from: MR Lumbar Spine No Cont (01.08.21 @ 13:03) >  FINDINGS:    Please note there is a transitional lumbosacral vertebral body. This will be termed L5 for the bursa this dictation. However, imaging of the entire spine would be needed for definitive enumeration.    DISC LEVEL EVALUATION:    L1/L2: Normal  L2/L3: Normal  L3/L4: Normal  L4/L5: There is mild disc degeneration with a moderate to large posterior disc protrusion that is slightly asymmetric to the right and impinges on the descending right L5 nerve root. There is mild central canal stenosis and moderate left paracentral stenosis.  L5/S1: The L5 vertebral body is partially sacralized with broad bilateral transverse processes. The right-sided transverse process is diffuse the sacrum. The left-sided transverse process articulates with the sacrum.    SPINAL ALIGNMENT: Normal  DISTAL CORD AND CONUS: The distal cord is normal in appearance. The conus terminates at L1 and is unremarkable.  SI JOINTS: Normal  MARROW: There is no bone marrow edema or fracture.  PARASPINAL MUSCLE AND SOFT TISSUES: Normal  INTRAABDOMINAL/INTRAPELVIC SOFT TISSUES: Normal    IMPRESSION: Disc degeneration with a moderate to large posterior disc protrusion that is asymmetric to the right at L4-L5 and impinges on the descending right L5 nerve root.    Transitional lumbosacral vertebral body that is termed a sacralized L5 for the purpose of this dictation. Imaging of the entire spine would be needed for definitive enumeration.    MEDS:  acetaminophen     Tablet .. 650 milliGRAM(s) Oral every 6 hours  cyclobenzaprine 5 milliGRAM(s) Oral three times a day PRN  enoxaparin Injectable 40 milliGRAM(s) SubCutaneous every 24 hours  HYDROmorphone  Injectable 1 milliGRAM(s) IV Push every 6 hours PRN  ibuprofen  Tablet. 400 milliGRAM(s) Oral three times a day  lidocaine   4% Patch 1 Patch Transdermal daily    PHYSICAL EXAM:  Vital Signs Last 24 Hrs  T(C): 36.7 (14 Apr 2022 07:11), Max: 36.8 (13 Apr 2022 19:12)  T(F): 98.1 (14 Apr 2022 07:11), Max: 98.2 (13 Apr 2022 19:12)  HR: 74 (14 Apr 2022 07:11) (74 - 81)  BP: 102/76 (14 Apr 2022 07:11) (102/76 - 124/78)  BP(mean): --  RR: 18 (14 Apr 2022 07:11) (16 - 18)  SpO2: 100% (14 Apr 2022 07:11) (98% - 100%)    AOx3, appropriate, follows commands  PERRL, EOMI, face symmetrical   Motor: LUE/LLE 5/5 strength throughout, RUE 5/5, RLE 5/5 HF/LE/PF, 4/5 DF  Sensation intact to light touch     LABS:                        11.2   9.72  )-----------( 267      ( 14 Apr 2022 07:35 )             34.2     04-14    139  |  105  |  16  ----------------------------<  92  3.7   |  19<L>  |  0.47<L>    Ca    8.9      14 Apr 2022 07:35    TPro  6.9  /  Alb  3.9  /  TBili  0.4  /  DBili  x   /  AST  20  /  ALT  39<H>  /  AlkPhos  106  04-13

## 2022-04-14 NOTE — PROGRESS NOTE ADULT - PROBLEM SELECTOR PLAN 1
- outpt MRI showed L5 nerve root compression  - pt deferred surgery due to family issues  - pain control: standing ibuprofen and tylenol, IV dilaudid PRN for severe pain  - consult: pain management, PMNR, and ortho if necessary  - consider another MRI to track progression

## 2022-04-14 NOTE — PHYSICAL THERAPY INITIAL EVALUATION ADULT - ADDITIONAL COMMENTS
Patient lives in a private house with mother and father, +steps to enter and has established living quarters on the first floor. Patient denies use of a Single Axis Cane or rolling walker prior to admission and was independent with all mobility and ADL performance.     Patient left seated at edge of bed, call bell within reach, bed alarm set, in NAD. RN Dinorah present and aware.

## 2022-04-14 NOTE — CONSULT NOTE ADULT - PROBLEM SELECTOR RECOMMENDATION 9
- MRI lumbar spine  - Decadron 4mg [id number]    Case discussed with attending neurosurgeon Dr. Butcher

## 2022-04-14 NOTE — CONSULT NOTE ADULT - SUBJECTIVE AND OBJECTIVE BOX
HPI:  31F PMH L5 herniation following up outpt w/ PMNR presented to ED due to uncontrolled back pain and inability to ambulate after being dc'd from ED. Pt received IM toradol which improved her pain. She was a regular f/u w/ Dr. Pinedo and was recommended a spine surgery, but she refused the surgery for now due to [place]  [place] and she had difficulty taking care of her son if she is going through surgery. She denied any saddle anesthesia, any urinary retention, or bowel movement incontinence. She reported severe 10/10 back pain radiating to the R leg. At her baseline she can ambulate when walking close to a wall.     In the ED, she received multi-modal pain regimen, including steroids, valium, dilaudid, none of which helped with the pain. Yesterday in the ED she received IM toradol which helped her with the pain. MRI spine in 2022 showed L5 nerve root compression. (2022 16:21)    Patient was admitted on , awaiting Ortho evaluation. She was last seen in office by Dr. Ivan in 2021, surgical evaluation was recommended due to lower back pain refractory to conservative care including lumbar DONAVON and 4/5 weakness in right dorsiflexion, EHL strength. Patient was seen today, reports pain is slightly improved, continues to travel down right leg to foot. Denies changes in bowel/bladder control. She reports she has been careful not to lift/bend at home, pain worse over past three days. Her Mother is caring for her children while she is in hospital.      REVIEW OF SYSTEMS  Constitutional - No fever, No weight loss, No fatigue  HEENT - No eye pain, No visual disturbances, No difficulty hearing, No tinnitus, No vertigo, No neck pain  Respiratory - No cough, No wheezing, No shortness of breath  Cardiovascular - No chest pain, No palpitations  Gastrointestinal - No abdominal pain, No nausea, No vomiting, No diarrhea, No constipation  Genitourinary - No dysuria, No frequency, No hematuria, No incontinence  Neurological - No headaches, No memory loss, No loss of strength, No numbness, No tremors  Psychiatric - No depression, No anxiety    VITALS  T(C): 36.7 (22 @ 07:11), Max: 36.8 (22 @ 19:12)  HR: 74 (22 @ 07:11) (74 - 85)  BP: 102/76 (22 @ 07:11) (102/76 - 132/65)  RR: 18 (22 @ 07:11) (16 - 18)  SpO2: 100% (22 @ 07:11) (98% - 100%)  Wt(kg): --    PAST MEDICAL & SURGICAL HISTORY  Calculus of gallbladder    HNP (herniated nucleus pulposus), lumbar    History of pleural effusion    Preeclampsia    No significant past surgical history    S/P cholecystectomy        SOCIAL HISTORY  Smoking - Denied  EtOH - Denied   Drugs - Denied    FUNCTIONAL HISTORY  Independent ADLs     CURRENT FUNCTIONAL STATUS  PT evals pending     FAMILY HISTORY   FH: osteoporosis (Mother)        RECENT LABS/IMAGING  CBC Full  -  ( 2022 07:35 )  WBC Count : 9.72 K/uL  RBC Count : 3.95 M/uL  Hemoglobin : 11.2 g/dL  Hematocrit : 34.2 %  Platelet Count - Automated : 267 K/uL  Mean Cell Volume : 86.6 fL  Mean Cell Hemoglobin : 28.4 pg  Mean Cell Hemoglobin Concentration : 32.7 gm/dL  Auto Neutrophil # : x  Auto Lymphocyte # : x  Auto Monocyte # : x  Auto Eosinophil # : x  Auto Basophil # : x  Auto Neutrophil % : x  Auto Lymphocyte % : x  Auto Monocyte % : x  Auto Eosinophil % : x  Auto Basophil % : x        139  |  105  |  16  ----------------------------<  92  3.7   |  19<L>  |  0.47<L>    Ca    8.9      2022 07:35    TPro  6.9  /  Alb  3.9  /  TBili  0.4  /  DBili  x   /  AST  20  /  ALT  39<H>  /  AlkPhos  106  04-13    Urinalysis Basic - ( 2022 09:56 )    Color: Yellow / Appearance: Slightly Turbid / S.040 / pH: x  Gluc: x / Ketone: Negative  / Bili: Negative / Urobili: <2 mg/dL   Blood: x / Protein: 100 mg/dL / Nitrite: Negative   Leuk Esterase: Large / RBC: 3 /HPF / WBC 45 /HPF   Sq Epi: x / Non Sq Epi: Moderate / Bacteria: Few    < from: MR Lumbar Spine No Cont (21 @ 13:03) >    IMPRESSION: Disc degeneration with a moderate to large posterior disc protrusion that is asymmetric to the right at L4-L5 and impinges on the descending right L5 nerve root.    Transitional lumbosacral vertebral body that is termed a sacralized L5 for the purpose of this dictation. Imaging of the entire spine would be needed for definitive enumeration.        < end of copied text >      ALLERGIES  No Known Allergies      MEDICATIONS   acetaminophen     Tablet .. 650 milliGRAM(s) Oral every 6 hours  enoxaparin Injectable 40 milliGRAM(s) SubCutaneous every 24 hours  HYDROmorphone  Injectable 1 milliGRAM(s) IV Push every 6 hours PRN  ibuprofen  Tablet. 400 milliGRAM(s) Oral three times a day  lidocaine   4% Patch 1 Patch Transdermal daily      ----------------------------------------------------------------------------------------  PHYSICAL EXAM  Constitutional - NAD, Comfortable, in bed   Chest - Breathing comfortably  Cardiovascular - S1S2   Abdomen - Soft   Extremities - No C/C/E, No calf tenderness   Neurologic Exam -                    Cognitive - Awake, Alert, AAO to self, place, date, year, situation     Communication - Fluent, No dysarthria        Motor -                     LEFT    UE - ShAB 5/5, EF 5/5, EE 5/5, WE 5/5,  5/5                    RIGHT UE - ShAB 5/5, EF 5/5, EE 5/5, WE 5/5,  5/5                    LEFT    LE - HF 5/5, KE 5/5, DF 5/5, PF 5/5                    RIGHT LE - HF 5/5, KE 5/5, DF 4/5, PF 5/5 limited by pain     Sensory - Intact to LT     Psychiatric - Mood stable, Affect WNL  ----------------------------------------------------------------------------------------  ASSESSMENT/PLAN  31yFemale with functional deficits due to lumbar radiculopathy, disc protrusion   received steroids, valium in ED  Pain - Tylenol, ibuprofen, lidocaine patch, heat, start flexeril  DVT PPX - SCDs, lovenox   Rehab -   bedside therapy/PT eval  out of bed to chair  awaiting ortho eval, no new weakness compared to prior exams in office   Will continue to follow for ongoing rehab needs and recommendations.

## 2022-04-14 NOTE — CONSULT NOTE ADULT - ASSESSMENT
32 yo F with known L4-5 disc herniation, failed conservative management,  presenting with severe low back pain and difficulty ambulating

## 2022-04-14 NOTE — CONSULT NOTE ADULT - SUBJECTIVE AND OBJECTIVE BOX
Chief Complaint:  unrelieved lower back pain    HPI:  31F PMH L5 herniation following up outpt w/ PMNR presented to ED due to uncontrolled back pain and inability to ambulate after being dc'd from ED. Pt received IM toradol which improved her pain. She was a regular f/u w/ Dr. Pinedo and was recommended a spine surgery, but she refused the surgery for now due [place] and she had difficulty taking care of her son if she is going through surgery. She denied any saddle anesthesia, any urinary retention, or bowel movement incontinence. She reported severe 10/10 back pain radiating to the R leg. At her baseline she can ambulate when walking close to a wall.     In the ED, she received multi-modal pain regimen, including steroids, valium, dilaudid, none of which helped with the pain. Yesterday in the ED she received IM toradol which helped her with the pain. MRI spine in 2022 showed L5 nerve root compression. (2022 16:21)      PAST MEDICAL & SURGICAL HISTORY:  Calculus of gallbladder  diagnosed 2020; hospitalized -2020 with acute eric  HNP (herniated nucleus pulposus), lumbar  Preeclampsia  2020  S/P cholecystectomy    FAMILY HISTORY:  FH: osteoporosis (Mother)    SOCIAL HISTORY:  [ x] Denies Smoking, Alcohol, or Drug Use    Allergies  No Known Allergies    PAIN MEDICATIONS:  acetaminophen     Tablet .. 650 milliGRAM(s) Oral every 6 hours  cyclobenzaprine 5 milliGRAM(s) Oral three times a day PRN  HYDROmorphone  Injectable 1 milliGRAM(s) IV Push every 6 hours PRN    Heme:  enoxaparin Injectable 40 milliGRAM(s) SubCutaneous every 24 hours    GI:  pantoprazole    Tablet 40 milliGRAM(s) Oral before breakfast    Endocrine:  dexAMETHasone     Tablet 4 milliGRAM(s) Oral every 8 hours    All Other Medications:  lidocaine   4% Patch 1 Patch Transdermal daily      Vital Signs Last 24 Hrs  T(C): 36.4 (2022 14:01), Max: 36.8 (2022 19:12)  T(F): 97.6 (2022 14:01), Max: 98.2 (2022 19:12)  HR: 75 (2022 14:01) (74 - 80)  BP: 108/67 (2022 14:01) (102/76 - 124/78)  BP(mean): --  RR: 16 (2022 14:01) (16 - 18)  SpO2: 100% (2022 14:01) (98% - 100%)    PAIN SCORE:   10/10      SCALE USED: (1-10 VNRS)           LABS:                          11.2   9.72  )-----------( 267      ( 2022 07:35 )             34.2     04-14    139  |  105  |  16  ----------------------------<  92  3.7   |  19<L>  |  0.47<L>    Ca    8.9      2022 07:35    TPro  6.9  /  Alb  3.9  /  TBili  0.4  /  DBili  x   /  AST  20  /  ALT  39<H>  /  AlkPhos  106  04-13      Urinalysis Basic - ( 2022 13:08 )    Color: Light Yellow / Appearance: Clear / S.009 / pH: x  Gluc: x / Ketone: Negative  / Bili: Negative / Urobili: <2 mg/dL   Blood: x / Protein: Negative / Nitrite: Negative   Leuk Esterase: Negative / RBC: 1 /HPF / WBC 1 /HPF   Sq Epi: x / Non Sq Epi: 1 /HPF / Bacteria: Negative    [ x]  [place]  Reviewed, no medications found     Summary:  As per patient, her lower back pain started 2 years ago.  When she went to the doctor 2 years ago, she was told that she had a lower back disc herniation.  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  The patient did not want surgery and was given an epidural injection, but the pain relief only lasted 24 hours.  Eventually pain subdued to a tolerable level.  Patient was then sidetracked with her life, and having a baby.  She never addressed her back pain, until 3 days ago when the pain became unbearable.  The patient currently has 10/10, lower back pain, radiating down her right leg and then to her foot.  The patient describes her pain as being sharp and at times tingling by her lower leg and feet.  The patient feels better lying down in bed, rather than sitting or standing.  As per team, patient has an L5 disc herniation.  Discussed pain regimen plan with patient and she verbally agreed.     PHYSICAL EXAM:  GENERAL: Alert & Oriented x 3 in NAD, well-groomed, well-developed, difficulty standing up, plantar and dorsiflexion of left foot because it causes her pain.  However, patient able to ambulate to bathroom.      Impression/Plan: Requested by Medicine Team to help manage pain.   Recommendations:  NO NSAIDs patient on Lovenox may cause possible bleeding.  Discussed patient with Chronic Pain Attending on call, Dr. SANDEE Nicole, and these are his following recommendations:  -  Consider discontinuing IV Dilaudid order.     -  Consider ordering Oxycodone 5mg q 6 H PRN for moderate to severe pain.  Hold for oversedation. Not to be given within 1 hour of any other immediate acting opioid.  -  Consider ordering Tylenol 500mg q 6 hours standing x2 days, then PRN for pain. Do not give within 6 hours of last dose of Tylenol.   -  Consider ordering Gabapentin 200mg q12 hours x 5 days.  Hold for oversedation.  -  Consider ordering Flexeril 5mg every 8 hours standing x1 day then PRN for pain    -  Recommend Back brace for stability, only if ok with neurosurgery or ortho/primary team  -  Recommend maintaining continuous pulse oximetry.  -  Recommend Physical Therapy consult for TENS therapy and possible assisted device for ambulation  -  Recommend follow up with Ortho consult or Neurosurgery for possible back brace or surgery.   -  Recommend follow up with Outpatient Pain doctor when discharged. If patient does not have a Pain Management doctor, may acquire one through patient's personal insurance carrier.    No further recommendations at this time, Chronic pain service to sign off. May call Chronic Pain Service if needed.   Thank you.

## 2022-04-15 ENCOUNTER — APPOINTMENT (OUTPATIENT)
Age: 32
End: 2022-04-15

## 2022-04-15 ENCOUNTER — RESULT REVIEW (OUTPATIENT)
Age: 32
End: 2022-04-15

## 2022-04-15 LAB
ANION GAP SERPL CALC-SCNC: 13 MMOL/L — SIGNIFICANT CHANGE UP (ref 7–14)
APTT BLD: 30 SEC — SIGNIFICANT CHANGE UP (ref 27–36.3)
BLD GP AB SCN SERPL QL: NEGATIVE — SIGNIFICANT CHANGE UP
BUN SERPL-MCNC: 18 MG/DL — SIGNIFICANT CHANGE UP (ref 7–23)
CALCIUM SERPL-MCNC: 9.3 MG/DL — SIGNIFICANT CHANGE UP (ref 8.4–10.5)
CHLORIDE SERPL-SCNC: 106 MMOL/L — SIGNIFICANT CHANGE UP (ref 98–107)
CO2 SERPL-SCNC: 18 MMOL/L — LOW (ref 22–31)
CREAT SERPL-MCNC: 0.56 MG/DL — SIGNIFICANT CHANGE UP (ref 0.5–1.3)
EGFR: 127 ML/MIN/1.73M2 — SIGNIFICANT CHANGE UP
GLUCOSE SERPL-MCNC: 145 MG/DL — HIGH (ref 70–99)
HCG SERPL-ACNC: <5 MIU/ML — SIGNIFICANT CHANGE UP
HCT VFR BLD CALC: 35.2 % — SIGNIFICANT CHANGE UP (ref 34.5–45)
HGB BLD-MCNC: 11.2 G/DL — LOW (ref 11.5–15.5)
INR BLD: 0.92 RATIO — SIGNIFICANT CHANGE UP (ref 0.88–1.16)
MAGNESIUM SERPL-MCNC: 2.1 MG/DL — SIGNIFICANT CHANGE UP (ref 1.6–2.6)
MCHC RBC-ENTMCNC: 28.1 PG — SIGNIFICANT CHANGE UP (ref 27–34)
MCHC RBC-ENTMCNC: 31.8 GM/DL — LOW (ref 32–36)
MCV RBC AUTO: 88.2 FL — SIGNIFICANT CHANGE UP (ref 80–100)
NRBC # BLD: 0 /100 WBCS — SIGNIFICANT CHANGE UP
NRBC # FLD: 0 K/UL — SIGNIFICANT CHANGE UP
PHOSPHATE SERPL-MCNC: 2.3 MG/DL — LOW (ref 2.5–4.5)
PLATELET # BLD AUTO: 258 K/UL — SIGNIFICANT CHANGE UP (ref 150–400)
POTASSIUM SERPL-MCNC: 4.5 MMOL/L — SIGNIFICANT CHANGE UP (ref 3.5–5.3)
POTASSIUM SERPL-SCNC: 4.5 MMOL/L — SIGNIFICANT CHANGE UP (ref 3.5–5.3)
PROTHROM AB SERPL-ACNC: 10.7 SEC — SIGNIFICANT CHANGE UP (ref 10.5–13.4)
RBC # BLD: 3.99 M/UL — SIGNIFICANT CHANGE UP (ref 3.8–5.2)
RBC # FLD: 12.9 % — SIGNIFICANT CHANGE UP (ref 10.3–14.5)
RH IG SCN BLD-IMP: POSITIVE — SIGNIFICANT CHANGE UP
SODIUM SERPL-SCNC: 137 MMOL/L — SIGNIFICANT CHANGE UP (ref 135–145)
WBC # BLD: 6.9 K/UL — SIGNIFICANT CHANGE UP (ref 3.8–10.5)
WBC # FLD AUTO: 6.9 K/UL — SIGNIFICANT CHANGE UP (ref 3.8–10.5)

## 2022-04-15 PROCEDURE — 88304 TISSUE EXAM BY PATHOLOGIST: CPT | Mod: 26

## 2022-04-15 PROCEDURE — 72100 X-RAY EXAM L-S SPINE 2/3 VWS: CPT | Mod: 26

## 2022-04-15 PROCEDURE — 99232 SBSQ HOSP IP/OBS MODERATE 35: CPT

## 2022-04-15 PROCEDURE — 63047 LAM FACETEC & FORAMOT LUMBAR: CPT

## 2022-04-15 PROCEDURE — 99221 1ST HOSP IP/OBS SF/LOW 40: CPT | Mod: 57

## 2022-04-15 PROCEDURE — 63047 LAM FACETEC & FORAMOT LUMBAR: CPT | Mod: AS

## 2022-04-15 DEVICE — BONE WAX 2.5GM: Type: IMPLANTABLE DEVICE | Status: FUNCTIONAL

## 2022-04-15 DEVICE — FLOSEAL FAST PREP 5ML: Type: IMPLANTABLE DEVICE | Status: FUNCTIONAL

## 2022-04-15 DEVICE — SURGIFOAM PAD 8CM X 12.5CM X 2MM (100C): Type: IMPLANTABLE DEVICE | Status: FUNCTIONAL

## 2022-04-15 DEVICE — AVITENE: Type: IMPLANTABLE DEVICE | Status: FUNCTIONAL

## 2022-04-15 RX ORDER — HYDROMORPHONE HYDROCHLORIDE 2 MG/ML
1 INJECTION INTRAMUSCULAR; INTRAVENOUS; SUBCUTANEOUS EVERY 4 HOURS
Refills: 0 | Status: DISCONTINUED | OUTPATIENT
Start: 2022-04-15 | End: 2022-04-16

## 2022-04-15 RX ORDER — HYDROMORPHONE HYDROCHLORIDE 2 MG/ML
0.5 INJECTION INTRAMUSCULAR; INTRAVENOUS; SUBCUTANEOUS
Refills: 0 | Status: DISCONTINUED | OUTPATIENT
Start: 2022-04-15 | End: 2022-04-15

## 2022-04-15 RX ORDER — OXYCODONE HYDROCHLORIDE 5 MG/1
5 TABLET ORAL ONCE
Refills: 0 | Status: DISCONTINUED | OUTPATIENT
Start: 2022-04-15 | End: 2022-04-15

## 2022-04-15 RX ORDER — ONDANSETRON 8 MG/1
4 TABLET, FILM COATED ORAL ONCE
Refills: 0 | Status: DISCONTINUED | OUTPATIENT
Start: 2022-04-15 | End: 2022-04-15

## 2022-04-15 RX ORDER — SODIUM CHLORIDE 9 MG/ML
500 INJECTION, SOLUTION INTRAVENOUS
Refills: 0 | Status: DISCONTINUED | OUTPATIENT
Start: 2022-04-15 | End: 2022-04-16

## 2022-04-15 RX ADMIN — SODIUM CHLORIDE 75 MILLILITER(S): 9 INJECTION INTRAMUSCULAR; INTRAVENOUS; SUBCUTANEOUS at 15:51

## 2022-04-15 RX ADMIN — Medication 4 MILLIGRAM(S): at 05:32

## 2022-04-15 RX ADMIN — LIDOCAINE 1 PATCH: 4 CREAM TOPICAL at 03:35

## 2022-04-15 RX ADMIN — Medication 500 MILLIGRAM(S): at 18:40

## 2022-04-15 RX ADMIN — PANTOPRAZOLE SODIUM 40 MILLIGRAM(S): 20 TABLET, DELAYED RELEASE ORAL at 06:51

## 2022-04-15 RX ADMIN — GABAPENTIN 200 MILLIGRAM(S): 400 CAPSULE ORAL at 05:32

## 2022-04-15 RX ADMIN — HYDROMORPHONE HYDROCHLORIDE 0.5 MILLIGRAM(S): 2 INJECTION INTRAMUSCULAR; INTRAVENOUS; SUBCUTANEOUS at 16:15

## 2022-04-15 RX ADMIN — HYDROMORPHONE HYDROCHLORIDE 0.5 MILLIGRAM(S): 2 INJECTION INTRAMUSCULAR; INTRAVENOUS; SUBCUTANEOUS at 15:49

## 2022-04-15 RX ADMIN — CYCLOBENZAPRINE HYDROCHLORIDE 5 MILLIGRAM(S): 10 TABLET, FILM COATED ORAL at 21:25

## 2022-04-15 RX ADMIN — Medication 500 MILLIGRAM(S): at 23:40

## 2022-04-15 RX ADMIN — OXYCODONE HYDROCHLORIDE 5 MILLIGRAM(S): 5 TABLET ORAL at 18:41

## 2022-04-15 RX ADMIN — SODIUM CHLORIDE 500 MILLILITER(S): 9 INJECTION, SOLUTION INTRAVENOUS at 23:50

## 2022-04-15 RX ADMIN — GABAPENTIN 200 MILLIGRAM(S): 400 CAPSULE ORAL at 20:06

## 2022-04-15 RX ADMIN — Medication 500 MILLIGRAM(S): at 05:32

## 2022-04-15 RX ADMIN — OXYCODONE HYDROCHLORIDE 5 MILLIGRAM(S): 5 TABLET ORAL at 04:14

## 2022-04-15 RX ADMIN — OXYCODONE HYDROCHLORIDE 5 MILLIGRAM(S): 5 TABLET ORAL at 05:00

## 2022-04-15 NOTE — PROGRESS NOTE ADULT - ATTENDING COMMENTS
Patient seen and examined, care plan discussed with house staff as above.    Ms. Becerril is a 31yoF p/w backpain, now unable to walk due to severity, worse on the R side. Hx notable for herniated disc of L4/5, diagnosed by MR Jan 2021, Also w post partum complications from cesarian section in Aug 2020 requiring hospitalization w pleural effusions, severe epigastric pain, HTN, lap cholecystectomy 2021.    #Severe Back pain 2/2 herniated disc of L4/5   First became problematic 2 years ago. Surgery was deferred in outpatient setting back in Jan due to patient not having childcare perioperatively since [place]  [place]. Prior imaging: "Disc degeneration with a moderate to large posterior disc protrusion that is asymmetric to the right at L4-L5 and impinges on the descending right L5 nerve root." On MR Jan 2021. Treatments thus far include PT and epidural injections.  - Tylenol alternating, scheduled; d/c ibuprofen with initiation of dexamethasone  - IV toradol q 8 hrs prn for moderate pain, IV dilaudid 1mg q 4 hrs for severe pain  - Can also trial flexeril as adjunct therapy  - Bowel regimen  - Failed gabapentin, epidural injections in outpt setting per review of prior records  - Pain mgmt consult, PMR consult  - Spine consult--> rec's decadron (4/14- ), OR on 4/15  - NPO midnight 4/14  - Daily CBC, BMP
Patient seen and examined, care plan discussed with house staff as above.    NSG to OR today. Transfer to Surgical service post procedure. CTM for steroid induced hyperglycemia if steroids are continued post operatively

## 2022-04-15 NOTE — PROGRESS NOTE ADULT - ASSESSMENT
31F PMH L5 herniation following up outpt w/ PMNR presented to ED due to uncontrolled back pain and inability to ambulate after being dc'd from ED. Outpt MRI in 1/2022 showed L5 herniation compressing on nerve root. Pt had no alarming signs of saddle anesthesia. Admitted for pain control w/ pain management consult placed. 31F PMH L5 herniation following up outpt w/ PMNR presented to ED due to uncontrolled back pain and inability to ambulate after being dc'd from ED. Outpt MRI in 1/2022 showed L5 herniation compressing on nerve root. Pt had no alarming signs of saddle anesthesia. Admitted for pain control w/ multi-modal pain regimen, and patient will undergo surgery for herniated disc today.

## 2022-04-15 NOTE — PROGRESS NOTE ADULT - PROBLEM SELECTOR PLAN 1
- outpt MRI showed L5 nerve root compression  - pt deferred surgery due to family issues  - pain control: standing ibuprofen and tylenol, IV dilaudid PRN for severe pain  - consult: pain management, PMNR, and ortho if necessary  - consider another MRI to track progression - outpt MRI showed L5 nerve root compression  - pt deferred surgery due to family issues  - consult: pain management: PRN oxycodone, flexeril, c/w steroid and tylenol.  - consider another MRI to track progression: has some nerve root compression, will undergo surgical repair today by neurosurgery at this admission

## 2022-04-15 NOTE — PROGRESS NOTE ADULT - SUBJECTIVE AND OBJECTIVE BOX
PROGRESS NOTE:     Patient is a 31y old  Female who presents with a chief complaint of Back Pain (2022 17:51)      SUBJECTIVE / OVERNIGHT EVENTS:    ADDITIONAL REVIEW OF SYSTEMS:    MEDICATIONS  (STANDING):  acetaminophen     Tablet .. 500 milliGRAM(s) Oral every 6 hours  dexAMETHasone     Tablet 4 milliGRAM(s) Oral every 8 hours  gabapentin 200 milliGRAM(s) Oral every 12 hours  lidocaine   4% Patch 1 Patch Transdermal daily  pantoprazole    Tablet 40 milliGRAM(s) Oral before breakfast  sodium chloride 0.9%. 1000 milliLiter(s) (75 mL/Hr) IV Continuous <Continuous>    MEDICATIONS  (PRN):  cyclobenzaprine 5 milliGRAM(s) Oral three times a day PRN Muscle Spasm  oxyCODONE    IR 5 milliGRAM(s) Oral every 6 hours PRN Moderate Pain (4 - 6)      CAPILLARY BLOOD GLUCOSE        I&O's Summary    2022 07:01  -  15 Apr 2022 07:00  --------------------------------------------------------  IN: 740 mL / OUT: 1100 mL / NET: -360 mL        PHYSICAL EXAM:  Vital Signs Last 24 Hrs  T(C): 36.8 (15 Apr 2022 05:30), Max: 36.8 (15 Apr 2022 05:30)  T(F): 98.3 (15 Apr 2022 05:30), Max: 98.3 (15 Apr 2022 05:30)  HR: 77 (15 Apr 2022 05:30) (75 - 77)  BP: 103/54 (15 Apr 2022 05:30) (103/54 - 112/63)  BP(mean): --  RR: 18 (15 Apr 2022 05:30) (16 - 18)  SpO2: 98% (15 Apr 2022 05:30) (97% - 100%)    CONSTITUTIONAL: NAD, well-developed  HEENT: normal conjunctiva, PEERLA  RESPIRATORY: Normal respiratory effort; lungs are clear to auscultation bilaterally  CARDIOVASCULAR: Regular rate and rhythm, normal S1 and S2, no murmur/rub/gallop;   ABDOMEN: No tenderness to palpation at all four quadrants, +BS  MUSCULOSKELETAL no clubbing or cyanosis of digits; no joint swelling or tenderness to palpation  EXTREMITIES No lower extremity edema; Peripheral pulses are 2+ bilaterally  SKIN: well-perfused, no dry skin    LABS:                        11.2   6.90  )-----------( 258      ( 15 Apr 2022 05:56 )             35.2     04-15    137  |  106  |  18  ----------------------------<  145<H>  4.5   |  18<L>  |  0.56    Ca    9.3      15 Apr 2022 05:56  Phos  2.3     -15  Mg     2.10     -15    TPro  6.9  /  Alb  3.9  /  TBili  0.4  /  DBili  x   /  AST  20  /  ALT  39<H>  /  AlkPhos  106  04-13    PT/INR - ( 15 Apr 2022 05:56 )   PT: 10.7 sec;   INR: 0.92 ratio         PTT - ( 15 Apr 2022 05:56 )  PTT:30.0 sec      Urinalysis Basic - ( 2022 13:08 )    Color: Light Yellow / Appearance: Clear / S.009 / pH: x  Gluc: x / Ketone: Negative  / Bili: Negative / Urobili: <2 mg/dL   Blood: x / Protein: Negative / Nitrite: Negative   Leuk Esterase: Negative / RBC: 1 /HPF / WBC 1 /HPF   Sq Epi: x / Non Sq Epi: 1 /HPF / Bacteria: Negative          RADIOLOGY & ADDITIONAL TESTS:  Results Reviewed:   Imaging Personally Reviewed:  Electrocardiogram Personally Reviewed:    COORDINATION OF CARE:  Care Discussed with Consultants/Other Providers [Y/N]:  Prior or Outpatient Records Reviewed [Y/N]:   PROGRESS NOTE:     Patient is a 31y old  Female who presents with a chief complaint of Back Pain (2022 17:51)      SUBJECTIVE / OVERNIGHT EVENTS: NAEON. VSS. Received oxycodone and flexeril PRN ON. Pt will undergo procedure today and will be then under neurosurgical team care.    Updated pt's sister-in-law and brother (Jong Avila) at bedside, reporting that MD is unable to predict how much longer she stays in the hospital and it will be under the surgical team's discretion how much longer she stays.    ADDITIONAL REVIEW OF SYSTEMS: back pain    MEDICATIONS  (STANDING):  acetaminophen     Tablet .. 500 milliGRAM(s) Oral every 6 hours  dexAMETHasone     Tablet 4 milliGRAM(s) Oral every 8 hours  gabapentin 200 milliGRAM(s) Oral every 12 hours  lidocaine   4% Patch 1 Patch Transdermal daily  pantoprazole    Tablet 40 milliGRAM(s) Oral before breakfast  sodium chloride 0.9%. 1000 milliLiter(s) (75 mL/Hr) IV Continuous <Continuous>    MEDICATIONS  (PRN):  cyclobenzaprine 5 milliGRAM(s) Oral three times a day PRN Muscle Spasm  oxyCODONE    IR 5 milliGRAM(s) Oral every 6 hours PRN Moderate Pain (4 - 6)      CAPILLARY BLOOD GLUCOSE        I&O's Summary    2022 07:01  -  15 Apr 2022 07:00  --------------------------------------------------------  IN: 740 mL / OUT: 1100 mL / NET: -360 mL        PHYSICAL EXAM:  Vital Signs Last 24 Hrs  T(C): 36.8 (15 Apr 2022 05:30), Max: 36.8 (15 Apr 2022 05:30)  T(F): 98.3 (15 Apr 2022 05:30), Max: 98.3 (15 Apr 2022 05:30)  HR: 77 (15 Apr 2022 05:30) (75 - 77)  BP: 103/54 (15 Apr 2022 05:30) (103/54 - 112/63)  BP(mean): --  RR: 18 (15 Apr 2022 05:30) (16 - 18)  SpO2: 98% (15 Apr 2022 05:30) (97% - 100%)    CONSTITUTIONAL: NAD, well-developed  HEENT: normal conjunctiva, PEERLA  RESPIRATORY: Normal respiratory effort; lungs are clear to auscultation bilaterally  CARDIOVASCULAR: Regular rate and rhythm, normal S1 and S2, no murmur/rub/gallop;   ABDOMEN: mild tenderness to palpation at mid-abdomen  MUSCULOSKELETAL no clubbing or cyanosis of digits; no joint swelling or tenderness to palpation  EXTREMITIES No lower extremity edema; Peripheral pulses are 2+ bilaterally; unchanged strength exam from yesterday  SKIN: well-perfused, no dry skin    LABS:                        11.2   6.90  )-----------( 258      ( 15 Apr 2022 05:56 )             35.2     04-15    137  |  106  |  18  ----------------------------<  145<H>  4.5   |  18<L>  |  0.56    Ca    9.3      15 Apr 2022 05:56  Phos  2.3     04-15  Mg     2.10     04-15    TPro  6.9  /  Alb  3.9  /  TBili  0.4  /  DBili  x   /  AST  20  /  ALT  39<H>  /  AlkPhos  106  04-13    PT/INR - ( 15 Apr 2022 05:56 )   PT: 10.7 sec;   INR: 0.92 ratio         PTT - ( 15 Apr 2022 05:56 )  PTT:30.0 sec      Urinalysis Basic - ( 2022 13:08 )    Color: Light Yellow / Appearance: Clear / S.009 / pH: x  Gluc: x / Ketone: Negative  / Bili: Negative / Urobili: <2 mg/dL   Blood: x / Protein: Negative / Nitrite: Negative   Leuk Esterase: Negative / RBC: 1 /HPF / WBC 1 /HPF   Sq Epi: x / Non Sq Epi: 1 /HPF / Bacteria: Negative          RADIOLOGY & ADDITIONAL TESTS:  Results Reviewed:   Imaging Personally Reviewed:  Electrocardiogram Personally Reviewed:    COORDINATION OF CARE:  Care Discussed with Consultants/Other Providers [Y/N]:  Prior or Outpatient Records Reviewed [Y/N]:

## 2022-04-15 NOTE — PROGRESS NOTE ADULT - SUBJECTIVE AND OBJECTIVE BOX
Post op Note    Dx:    31y    Female   s/p L4-5 rebeca lami and discectomy. Doing well in PACU, RLE pain has improved.           T(C): 36.9 (04-15-22 @ 16:00), Max: 36.9 (04-15-22 @ 16:00)  HR: 77 (04-15-22 @ 16:30) (71 - 98)  BP: 120/72 (04-15-22 @ 16:30) (103/54 - 139/78)  RR: 12 (04-15-22 @ 16:30) (12 - 20)  SpO2: 100% (04-15-22 @ 16:30) (97% - 100%)        Physical exam  Awake, alert, oriented x 3, PERRL, EOMI  Follows commands, speech clear  MICHELLE X4 with good strength , LE limited movement due to pain   Incision: C/D/I

## 2022-04-15 NOTE — PRE-OP CHECKLIST - NS PREOP CHK TEST_COVID_DT_GEN_ALL_CORE
Heart Failure Clinic Progress Note      Name:  Jeff Zimmerman      : 1933 MRN: [id number]   ?    Subjective:\"My breathing is OK but my knees hurs and I can't do things that I used to\"  Per daughter in law \" He drove to my house for the first time yesterday\"     HPI:Jeff Zimmerman is a 89 year oldmale with PMHx of persistent atrial fibrillation, hypertension, HFpEF, and group 2 pulmonary hypertension comes in today for scheduled heart failure clinic follow-up.  He was recently hospitalized with diastolic heart failure exacerbation secondary to uncontrolled hypertension and dietary indiscretion.  While in the hospital he was diuresed with IV furosemide and was discharged home on furosemide 40 mg p.o. daily.  He was seen in the CHF clinic 2 weeks ago and was found to be volume overloaded as evidenced by bilateral lower extremity edema.  He was advised to increase Lasix to 40 mg twice daily for 3 days and call the heart failure clinic on 3/25/2022 with a weight and condition update.  Reported a home weight of 193 pounds and denied shortness of breath and lower extremity edema.  The patient is seen by home health nurse who provided him with compression stockings. In addition he works with PT. His major concern according to home health notes is pain in both of his knees for which he takes PRN pain medication. Today the patient is accompanied by his daughter in law. She states the patient's PCP decreased lasix to 20 mg daily and Spironolactone to 12.5 mg daily. Patient's daughter in law refused blood draw for the patient stating she is convinced \"frequent blood draws cause blood clots\". Explained to her the need for the blood draw and that it is the way to monitor kidney function and electrolyte levels. The importance of proBNP and BMP re-explained and she was agreeable to the blood draw. She voiced frustration that the results of the previous blood drawn were not relayed to her. Explained that because there was no  change of medications the lab results were stable, the labs were not called in to her. The patient has given his full permission to discuss lab  Results and medical treatment with his daughter in law. Today Jeff denies increased SOB, CP, palpitations, LE edema, light headedness and dizziness.     Review of Systems:  Con: Denies increased fatigue, fever or chills.  ENT: No changes, Denies excess thirst.  CV: Lower extremity edema.  Denies CP, pressure, palpatations  Resp: JAMESON with long distances. Denies SOB at rest, JAMESON with ADLS or walking shortdistance.Denies PND and orthopnea (2 pillows standard ), or cough.  GI/: Denies bloating, early satiety, abd pain, BRBPR/dark tarry stools, constipation, dysuria or problems urinating.  MS: Knee pain, seeing pain specialist. Denies new issues.  INT: Denies rash or new issues.  Neuro: Denies dizziness, CVA sxs.  Psych: Denies depression.    Objective:  Vitals with min/max:    Vital Last Value   Temperature 97.9 °F (36.6 °C) (04/05/22 1335)   Pulse 70 (04/05/22 1335)   Respiratory 20 (04/05/22 1335)   Non-Invasive  Blood Pressure 106/62 (04/05/22 1335)   Pulse Oximetry 98 % (04/05/22 1335)   Arterial   Blood Pressure        Wt Readings from Last 2 Encounters:   04/05/22 86.9 kg (191 lb 9.3 oz)   03/31/22 86.2 kg (190 lb)     Allergies:  ALLERGIES:  No Known Allergies    PMH:  Past Medical History:   Diagnosis Date   • Essential (primary) hypertension        Physical Exam:  General: Awake, alert and in no apparent distress.  HEENT: MMM, oropharynx clear  Neck: No JVD  Cardiac: irregular rate and rhythm, S1, S2 normal, no LE edema  Lungs: Clear, no wheezing, non-labored, equal breath sound  Abdomen:  non-tender, normal bowel sounds   Musculoskeletal: normal range of motion  Extremities: no LE edema  Neurologic: Normal affect, alert and oriented x 3  Skin: Warm and dry, normal for ethnicity.     Labs:  No results found for this or any previous visit (from the past 24  hour(s)).  No results found for: BNP      Imaging:  No orders to display     Ejection Fraction   Date Value Ref Range Status   2022 60%  Corrected     Impression  *[facility]*  [address] [place] [id number]  Transthoracic Echocardiogram (TTE)     Patient: Jeff Zimmerman    Study Date/Time:      Dec 29 2021 5:21PM  MRN:     [id number]            FIN#:                 [id number]  :     1933         Ht/Wt:                172cm 96kg  Age:     88                 BSA/BMI:              2.09m^2 32.4kg/m^2  Gender:  M                  Baseline BP:          153 / 95  Ordering Physician:   Izzy Camarena     Referring Physician:  Izzy Camarena     Attending Physician:  Dax Medley  Performing Physician: Samantha Carlson MD  Diagnostic Physician: Samantha Carlson MD  Sonographer:          VANDANA Todd     --------------------------------------------------------------------------  INDICATIONS:   Chest pain.     --------------------------------------------------------------------------  STUDY CONCLUSIONS  SUMMARY:     1. Left ventricle: The cavity size is normal. Wall thickness is mildly  increased. Systolic function is probably normal. Technically limited  but no gross regionality The ejection fraction was measured by visual  estimation. The study is not technically sufficient to allow evaluation  of LV diastolic function. There is no evidence of a thrombus . The  ejection fraction is 60%.  2. Aortic valve: Mild regurgitation.  3. Left atrium: The atrium is dilated.  4. Right ventricle: The cavity size is dilated. Systolic function is  reduced. Systolic pressure is moderately to markedly increased. The  estimated peak pressure is 55mm Hg.  5. Tricuspid valve: The regurgitant peak velocity is 3.5m/sec.     --------------------------------------------------------------------------  STUDY DATA:  There is no prior study available for comparison at this  time.   Procedure:  Transthoracic echocardiography was performed. Image  quality was suboptimal. The study was technically limited due to body  habitus. Intravenous contrast (Definity) was administered.  M-mode,  complete 2D, complete spectral Doppler, and color Doppler.  Study status:  STAT.  Study completion:  There were no complications.     FINDINGS     LEFT VENTRICLE:  The cavity size is normal. Wall thickness is mildly  increased. Systolic function is probably normal. Technically limited but  no gross regionality  There is no evidence of a thrombus .    The ejection  fraction was measured by visual estimation. The ejection fraction is 60%.  The study is not technically sufficient to allow evaluation of LV  diastolic function.     AORTIC VALVE:  Poorly visualized. The annulus is normal. The valve is  trileaflet. The leaflets are mildly calcified.  Doppler:   Mild  regurgitation.    The LVOT to aortic valve VTI ratio is 0.52. The valve  area by the velocity-time integral method is 1.7cm^2. The valve area index  by the velocity-time integral method is 0.81cm^2/m^2. The ratio of LVOT to  aortic valve peak velocity is 0.53. The valve area by the peak velocity  method is 1.7cm^2. The valve area index by the peak velocity method is  0.81cm^2/m^2.    The mean systolic gradient is 2mm Hg. The peak systolic  gradient is 4mm Hg.     AORTA:  Aortic root: The aortic root is normal in size.  Ascending aorta: The ascending aorta is normal in size.     MITRAL VALVE:  The annulus is normal. The leaflets are normal thickness  and mildly calcified.  Doppler:   Mild regurgitation.    The peak  diastolic gradient is 3mm Hg.     ATRIAL SEPTUM:   Color doppler shows no obvious shunt.     LEFT ATRIUM:  The atrium is dilated.     RIGHT VENTRICLE:  The cavity size is dilated. Systolic function is  reduced. Systolic pressure is moderately to markedly increased. The  estimated peak pressure is 55mm Hg.     PULMONIC VALVE:   The leaflets are  normal thickness.  Doppler:   Trivial  regurgitation.     TRICUSPID VALVE:   Structurally normal valve.    Doppler:   Mild-moderate  regurgitation.     RIGHT ATRIUM:  The atrium is dilated.     PERICARDIUM:  The pericardium is normal in appearance.     SYSTEMIC VEINS:  Inferior vena cava: The vessel is normal in size.     --------------------------------------------------------------------------  Measurements     Left ventricle               Value         Ref          Left atrium                Value          Ref  RENU major ax, A4C            7.1   cm      -----------  SI dim, A4C                5.5   cm       ---------  ESD major ax, A4C            6.2   cm      -----------  Area ES, A4C         (H)   27    cm^2     <=20  FS major axis, A4C           13    %       -----------  Area ES, A2C               25    cm^2     ---------  RENU/bsa major ax, A4C        3.4   cm/m^2  -----------  Vol, ES, 1-p A4C     (H)   93    ml       18 - 58  ESD/bsa major ax, A4C        3.0   cm/m^2  -----------  Vol/bsa, ES, 1-p A4C (H)   44    ml/m^2   12 - 37  ZEHRA, A4C                     24.6  cm^2    -----------  Vol, ES, 1-p A2C     (H)   88    ml       18 - 58  JOSÉ LUIS, A4C                     13.8  cm^2    -----------  Vol/bsa, ES, 1-p A2C       42    ml/m^2   11 - 43  FAC, A4C                     44    %       -----------  Vol, ES, 2-p               93    ml       ---------  EF                           60    %       52 - 72      Vol/bsa, ES, 2-p     (H)   44    ml/m^2   16 - 34  SV                           32    ml      -----------  AP dim, ES MM        (H)   5.5   cm       3.0 - 4.0  SV/bsa                       15    ml/m^2  -----------  AP dim index, ES MM  (H)   2.6   cm/m^2   1.5 - 2.3  SV, 1-p A4C                  45    ml      -----------  SV/bsa, 1-p A4C              21    ml/m^2  -----------  Aortic valve               Value          Ref  EDV, 2-p                     77    ml      62 - 150     Leaflet sep, MM            2.1    cm       ---------  ESV, 2-p                     37    ml      21 - 61      Peak v, S                  1     m/sec    ---------  SV, 2-p                      40    ml      -----------  Mean v, S                  0.67  m/sec    ---------  EDV/bsa, 2-p                 37    ml/m^2  34 - 74      Mean grad, S               2     mm Hg    ---------  ESV/bsa, 2-p                 17    ml/m^2  11 - 31      Peak grad, S               4     mm Hg    ---------  SV/bsa, 2-p                  19.3  ml/m^2  -----------  LVOT/AV, VTI ratio         0.52           ---------  ERNU, MM                      5.8   cm      4.2 - 5.8    PRAVIN, VTI                   1.7   cm^2     ---------  ESD, MM               (H)    4.3   cm      2.5 - 4.0    PRAVIN/bsa, VTI               0.81  cm^2/m^2 ---------  RENU/bsa, MM                  2.8   cm/m^2  2.2 - 3.0    LVOT/AV, Vpeak ratio       0.53           ---------  ESD/bsa, MM                  2.1   cm/m^2  1.3 - 2.1    PARVIN, Vmax                  1.7   cm^2     ---------  Mid-wall FS, MM              15    %       14 - 22      PRAVIN/bsa, Vmax              0.81  cm^2/m^2 ---------  PW, ED MM                    0.8   cm      0.6 - 1.0  PW/ID ratio, ED MM           0.14          -----------  Mitral valve               Value          Ref  IVS/PW ratio, ED MM          1.18          -----------  Peak E                     0.93  m/sec    ---------  Rel thickness, ED MM         0.29          0.24 - 0.42  Decel time                 204   ms       ---------  EDV, MM Teich.        (H)    165   ml      67 - 155     Peak grad, D               3     mm Hg    ---------  ESV, MM Teich.        (H)    84    ml      22 - 58  EDV/bsa, MM Teich.    (H)    79    ml/m^2  35 - 75      Pulmonic valve             Value          Ref  ESV/bsa, MM Teich.    (H)    40    ml/m^2  12 - 30      OH v, ED                   0.97  m/sec    ---------  Mass, MM                     202   g       88 - 224  Mass/bsa, MM                 97     g/m^2   49 - 115     Tricuspid valve            Value          Ref  Mass/ht, MM                  117   g/m     52 - 126     TR peak v            (H)   3.5   m/sec    <=2.8  Mass/ht^2.7, MM              47    g/m^2.7 -----------  Peak RV-RA grad, S         50    mm Hg    ---------  Max TR buzz                 3.23  m/sec    ---------  LVOT                         Value         Ref  Diam, S                      2.1   cm      -----------  Aortic root                Value          Ref  Area                         3.5   cm^2    -----------  Root diam, ED              3.0   cm       <4.2  Peak buzz, S                  0.52  m/sec   -----------  Peak grad, S                 1     mm Hg   -----------  Pulmonary artery           Value          Ref  Pressure, S                55    mm Hg    ---------  Ventricular septum           Value         Ref          Pressure ED                9     mm Hg    ---------  IVS, ED MM                   1.0   cm      0.6 - 1.0  Systemic veins             Value          Ref  Right ventricle              Value         Ref          Estimated CVP              5     mm Hg    ---------  Pressure, S                  55    mm Hg   -----------     Legend:  (L)  and  (H)  kathy values outside specified reference range.     Prepared and electronically signed by  Samantha Carlson MD  12/29/2021 19:13    Medications:  Current Outpatient Medications   Medication Sig Dispense Refill   • acetaminophen-codeine (TYLENOL NO.3) 300-30 MG per tablet Take 1 tablet by mouth every 6 hours as needed.     • Nebulizers (Vios Aerosol Delivery System) Misc USE FOUR TIMES DAILY     • metoPROLOL succinate (TOPROL-XL) 25 MG 24 hr tablet Take 3 tablets by mouth daily. 90 tablet 3   • amLODIPine (NORVASC) 5 MG tablet Take 1 tablet by mouth daily. 90 tablet 1   • acetaminophen (TYLENOL) 325 MG tablet Take 2 tablets by mouth every 6 hours as needed for Pain (knee pain).     • furosemide (LASIX) 40 MG tablet Take 1 tablet by  mouth daily. Do not start before March 18, 2022. (Patient taking differently: Take 20 mg by mouth daily. MD changed dose) 30 tablet 1   • albuterol (VENTOLIN) (2.5 MG/3ML) 0.083% nebulizer solution Take 3 mLs by nebulization 4 times daily. 360 mL 1   • spironolactone (ALDACTONE) 25 MG tablet Take 1 tablet by mouth daily. Do not start before March 18, 2022. (Patient taking differently: Take 12.5 mg by mouth daily. MD [id number]) 30 tablet 1   • umeclidinium-vilanterol (ANORO ELLIPTA) 62.5-25 MCG/INH inhaler Inhale 1 puff into the lungs daily. Do not start before March 18, 2022. 60 each 1   • lisinopril (ZESTRIL) 40 MG tablet Take 40 mg by mouth daily.     • lidocaine (LIDOCARE) 4 % patch Place 1 patch onto the skin daily. Indications: Knee Pain Do not start before March 19, 2022. Apply To Left Knee 60 patch 0   • apixaBAN (ELIQUIS) 5 MG Tab Take 1 tablet by mouth every 12 hours. 60 tablet 0     No current facility-administered medications for this visit.       Assessment and Plan:    1. Diastolic heart failure  Heart Failure Etiology: dietary indiscretion and poorly controlled BP  NYHA  Class III  Fluid status: compensated, euvolemic, LE edema resolved  LVEF 60%  Guideline directed medical therapy:   BB: metoprolol succinate ER 75 mg daily  ACE/ ARB/ARNI: Lisinopril 40 mg daily  Spironolactone 12.5 mg daily  Diuretic: furosemide 20 mg daily  SGLT2: none at this time  Hydralazine/Nitrate: none at this time  Device therapy:   Advanced therapies: not evalauted     Signs and symptoms of CHF exacerbation discussed with the patient in detail. Patient advised to call the CHF clinic if there is an increase in shortness of breath, lower extremity edema or weight gain of more than 2 to 3 pounds in a day or 5 pounds in a week.    2. Atrial fibrillation  - AC apixaban 5mg BID  -Rate controlled with metoprolol succinate 75 mg daily     3. Hypertension  -Lisinopril daily, metoprolol succinate  -BP at goal  -Low sodium  diet     4. Group 2 pulmonary hypertension  - Supplemental oxygen as needed; patient is currently on room air  - Continue anoro ellipta inhaler daily    5. ABAD?  -referral to sleep medicine given     Plan:  Labs drawn in the HF clinic today and reviewed   Continue current medications   Medication list reviewed using teach back method   CHF signs and symptoms reviewed   Daily weights, advised patient to call the CHF clinic if there is a weight gain of more than 2 to 3 pounds in a day or 5 pounds in a week or if there are any other signs or symptoms of CHF exacerbation   2 gram low-sodium diet  2 L fluid restriction   Dietary restrictions and compliance discussed in length   Follow-up with consults as scheduled   Follow-up in CHF clinic in 2 weeks  Advised patient to call CHF clinic with any questions or concerns  Low sodium hand outs provided  Plan of care discussed in detail with the patient's daughter in law  Printed lab results from last visit and provided them to the daughter in law  Advised that the patent gets signed up for the live well evgeny     Thank you for the pleasure of participating in this patients care.    Time spent in review, assessment, education, and documentation 40 minutes    Portions of this note may have been created using the dragon voice recognition system. Errors in content may be related to improper recognition of the system. Efforts to review and correct the note have been made but irregularities may still be present. Medication reconciliation was done but medications not prescribed by me may be inaccurate.    For this visit I performed pre-charting, chart review, documenting and referring/communicating with other health care professionals. Allergies and Medications were reviewed with the patient and updated during today's visit. In addition, I discussed medication dosage, usage, goals of therapy, and side effects with patient. Medication reconciliation was done but medications not  prescribed by me may be inaccurate. The patient's previous laboratory and cardiac diagnostic testing listed above has been reviewed and analyzed to establish my current plan of care. Previous outside notes were reviewed and taken into consideration when deciding further treatment.    VALERIA Valdez  4/5/2022  2:06 PM     13-Apr-2022

## 2022-04-15 NOTE — PROGRESS NOTE ADULT - SUBJECTIVE AND OBJECTIVE BOX
Patient seen and examined. In brief, 31-yo F with no significant PMH w/ a known history of L4-5 disc herniation, comes in with severe LBP with radiation down the R leg and inability to walk for 3 days. She's had extensive conservative management as an outpatient with PM&R with PT, ESIs over the past 2 years. Was recommended for surgery as op but [place] Now admitted to [facility] with maximal medical management including Decadron, Dilaudid, toradol, Valium without any significant relief. Denies LLE weakness or bowel/bladder incontinence.    On exam, LLE 5/5. RLE exquisitely pain-limited but does appear to have significant df/ehl weakness at 4/5.    MRI shows large L4-5 disc herniation, more eccentric to right, with obliteration of the lateral recess and foramen impinging the R L5 nerve root.    Explained pro and cons of surgical management of this vs. continued conservative management. Due to intractable pain, RLE weakness, and worsening symptoms despite PT, ESIs, meds, patient wishes to move forward with R L4-5 hemilam/disc. Risks/benefits/expected recovery discussed including infection, weakness, numbness, CSF leak, re-disc herniation, possible need for further back surgery down the road. She was in agreement with moving forward. Will facilitate today.    - OR for R L4-5 hemilam/disc    I spent 30 min at bedside evaluating the patient and coordinating her care.

## 2022-04-15 NOTE — BRIEF OPERATIVE NOTE - NSICDXBRIEFPROCEDURE_GEN_ALL_CORE_FT
PROCEDURES:  Laminectomy, spine, lumbar 15-Apr-2022 13:59:27 L4-5 rebeca laminectomy for discectomy James Steward

## 2022-04-15 NOTE — CHART NOTE - NSCHARTNOTEFT_GEN_A_CORE
Discussed management options for her disc herniation causing severe RLE pain.  the risks and benefits of cont conservative management vs surgery were discussed.   She would like to under go surgery.  -Pre op for tomorrow for L4/5 lami/microdiscectomy.
Ms. Becerril is a 31yoF p/w backpain, now unable to walk due to severity, worse on the R side. Hx notable for herniated disc of L4/5, diagnosed by MR Jan 2021, Also w post partum complications from cesarian section in Aug 2020 requiring hospitalization w pleural effusions, severe epigastric pain, HTN, lap cholecystectomy 2021.    #Severe Back pain 2/2 herniated disc of L4/5   First became problematic 2 years ago. Surgery was deferred in outpatient setting back in Jan due to patient not having childcare perioperatively since [place]  [place]. Prior imaging: "Disc degeneration with a moderate to large posterior disc protrusion that is asymmetric to the right at L4-L5 and impinges on the descending right L5 nerve root." On MR Jan 2021. Treatments thus far include PT and epidural injections.  - Tylenol/ibuprofen alternating, scheduled  - IV toradol q 8 hrs prn for moderate pain, IV dilaudid 1mg q 4 hrs for severe pain  - Can also trial flexeril as adjunct therapy  - Bowel regimen  - Failed gabapentin, epidural injections in outpt setting per review of prior records  - Pain mgmt consult, PMR consult  - Ortho consult in am for re-eval for surgical candidacy.  - Check a UA
Patient's RCRI score 0, class 1 risk, 3.9% 30 day risk of death, MI, or cardiac arrest. Patient low risk for intermediate risk procedure. Benefits of procedure outweigh risk.
11.2   9.72  )-----------( 267      ( 14 Apr 2022 07:35 )             34.2       Basic Metabolic Panel in AM (04.14.22 @ 07:35)    Sodium, Serum: 139 mmol/L    Potassium, Serum: 3.7 mmol/L    Chloride, Serum: 105 mmol/L    Carbon Dioxide, Serum: 19 mmol/L    Anion Gap, Serum: 15 mmol/L    Blood Urea Nitrogen, Serum: 16 mg/dL    Creatinine, Serum: 0.47 mg/dL    Glucose, Serum: 92 mg/dL    Calcium, Total Serum: 8.9 mg/dL    eGFR: 132: The estimated glomerular filtration rate (eGFR) is calculated using the  2021 CKD-EPI creatinine equation, which does not have a coefficient for  race and is validated in individuals 18 years of age and older (N Engl J  Med 2021; 385:5693-9234). Creatinine-based eGFR may be inaccurate in  various situations including but not limited to extremes of muscle mass,  altered dietary protein intake, or medications that affect renal tubular  creatinine secretion. mL/min/1.73m2    PT/INR - ( 14 Apr 2022 19:17 )   PT: 11.6 sec;   INR: 1.00 ratio         PTT - ( 14 Apr 2022 19:17 )  PTT:27.3 sec    Type + Screen (04.14.22 @ 19:24)    ABO Interpretation: A    Rh Interpretation: Positive    Antibody Screen: Negative    HCG Quantitative, Serum (04.14.22 @ 19:17)    HCG Quantitative, Serum: <5.0: For pregnancy evaluation the reference values are as follows:  Negative: <5 mIU/mL  Indeterminate: 5-25 mIU/mL (suggest repeat testing in 72hrs)  Positive: >25 mIU/mL  Note: hCG results of false positive and false negative for pregnancy are  rare, but can occur with this, and other hCG tests. Brook- and  post-menopausal females may have mildly elevated hCG concentrations,  usually less than 14 mIU/mL, that are constant over time.  Weeks of pregnancy:           mIU/mL  ------------------         -------          3                                6 -      71          4                              10 -     750          5                             220 -   7,100          6                             160 -  32,000          7                3,700 - 164,000          8                          32,000 - 150,000          9                          64,000 - 151,000         10                         47,000 - 187,000         12                         28,000 - 211,000         14                         14,000 -  63,000         15                         12,000 -  71,000         16 - 18                   8,000 -  58,000 mIU/mL      COVID-19 PCR: [place] (13 Apr 2022 12:36)

## 2022-04-16 DIAGNOSIS — Z98.890 OTHER SPECIFIED POSTPROCEDURAL STATES: ICD-10-CM

## 2022-04-16 RX ORDER — ENOXAPARIN SODIUM 100 MG/ML
40 INJECTION SUBCUTANEOUS EVERY 24 HOURS
Refills: 0 | Status: DISCONTINUED | OUTPATIENT
Start: 2022-04-17 | End: 2022-04-18

## 2022-04-16 RX ADMIN — Medication 500 MILLIGRAM(S): at 12:54

## 2022-04-16 RX ADMIN — PANTOPRAZOLE SODIUM 40 MILLIGRAM(S): 20 TABLET, DELAYED RELEASE ORAL at 05:12

## 2022-04-16 RX ADMIN — Medication 500 MILLIGRAM(S): at 05:12

## 2022-04-16 RX ADMIN — CYCLOBENZAPRINE HYDROCHLORIDE 5 MILLIGRAM(S): 10 TABLET, FILM COATED ORAL at 21:22

## 2022-04-16 RX ADMIN — OXYCODONE HYDROCHLORIDE 5 MILLIGRAM(S): 5 TABLET ORAL at 15:16

## 2022-04-16 RX ADMIN — OXYCODONE HYDROCHLORIDE 5 MILLIGRAM(S): 5 TABLET ORAL at 05:25

## 2022-04-16 RX ADMIN — OXYCODONE HYDROCHLORIDE 5 MILLIGRAM(S): 5 TABLET ORAL at 14:16

## 2022-04-16 RX ADMIN — CYCLOBENZAPRINE HYDROCHLORIDE 5 MILLIGRAM(S): 10 TABLET, FILM COATED ORAL at 05:16

## 2022-04-16 RX ADMIN — GABAPENTIN 200 MILLIGRAM(S): 400 CAPSULE ORAL at 05:12

## 2022-04-16 RX ADMIN — LIDOCAINE 1 PATCH: 4 CREAM TOPICAL at 12:54

## 2022-04-16 RX ADMIN — Medication 500 MILLIGRAM(S): at 18:36

## 2022-04-16 RX ADMIN — GABAPENTIN 200 MILLIGRAM(S): 400 CAPSULE ORAL at 18:36

## 2022-04-16 RX ADMIN — OXYCODONE HYDROCHLORIDE 5 MILLIGRAM(S): 5 TABLET ORAL at 04:25

## 2022-04-16 NOTE — PROGRESS NOTE ADULT - SUBJECTIVE AND OBJECTIVE BOX
No acute issues overnight  Vital Signs Last 24 Hrs  T(C): 37.1 (15 Apr 2022 21:17), Max: 37.1 (15 Apr 2022 21:17)  T(F): 98.7 (15 Apr 2022 21:17), Max: 98.7 (15 Apr 2022 21:17)  HR: 92 (15 Apr 2022 21:17) (71 - 98)  BP: 97/58 (15 Apr 2022 21:17) (97/58 - 139/78)  BP(mean): 76 (15 Apr 2022 17:45) (76 - 92)  RR: 18 (15 Apr 2022 21:17) (12 - 20)  SpO2: 99% (15 Apr 2022 21:17) (97% - 100%)    AAO X 3  PERRLA, EOMI  Face symmetric  MICHELLE strength 5/5  SILT    MEDICATIONS  (STANDING):  acetaminophen     Tablet .. 500 milliGRAM(s) Oral every 6 hours  gabapentin 200 milliGRAM(s) Oral every 12 hours  lactated ringers. 500 milliLiter(s) (500 mL/Hr) IV Continuous <Continuous>  lidocaine   4% Patch 1 Patch Transdermal daily  pantoprazole    Tablet 40 milliGRAM(s) Oral before breakfast  sodium chloride 0.9%. 1000 milliLiter(s) (75 mL/Hr) IV Continuous <Continuous>    MEDICATIONS  (PRN):  cyclobenzaprine 5 milliGRAM(s) Oral three times a day PRN Muscle Spasm  HYDROmorphone  Injectable 1 milliGRAM(s) IV Push every 4 hours PRN Severe Pain (7 - 10)  oxyCODONE    IR 5 milliGRAM(s) Oral every 6 hours PRN Moderate Pain (4 - 6)                          11.2   6.90  )-----------( 258      ( 15 Apr 2022 05:56 )             35.2     04-15    137  |  106  |  18  ----------------------------<  145<H>  4.5   |  18<L>  |  0.56    Ca    9.3      15 Apr 2022 05:56  Phos  2.3     04-15  Mg     2.10     04-15

## 2022-04-17 PROCEDURE — 72131 CT LUMBAR SPINE W/O DYE: CPT | Mod: 26

## 2022-04-17 RX ORDER — DEXAMETHASONE 0.5 MG/5ML
4 ELIXIR ORAL EVERY 6 HOURS
Refills: 0 | Status: DISCONTINUED | OUTPATIENT
Start: 2022-04-17 | End: 2022-04-18

## 2022-04-17 RX ORDER — SODIUM CHLORIDE 9 MG/ML
500 INJECTION INTRAMUSCULAR; INTRAVENOUS; SUBCUTANEOUS ONCE
Refills: 0 | Status: COMPLETED | OUTPATIENT
Start: 2022-04-17 | End: 2022-04-17

## 2022-04-17 RX ORDER — DEXAMETHASONE 0.5 MG/5ML
1 ELIXIR ORAL DAILY
Refills: 0 | Status: CANCELLED | OUTPATIENT
Start: 2022-04-23 | End: 2022-04-18

## 2022-04-17 RX ORDER — DEXAMETHASONE 0.5 MG/5ML
2 ELIXIR ORAL EVERY 12 HOURS
Refills: 0 | Status: CANCELLED | OUTPATIENT
Start: 2022-04-21 | End: 2022-04-18

## 2022-04-17 RX ORDER — DEXAMETHASONE 0.5 MG/5ML
ELIXIR ORAL
Refills: 0 | Status: DISCONTINUED | OUTPATIENT
Start: 2022-04-17 | End: 2022-04-18

## 2022-04-17 RX ORDER — DEXAMETHASONE 0.5 MG/5ML
1 ELIXIR ORAL EVERY 12 HOURS
Refills: 0 | Status: CANCELLED | OUTPATIENT
Start: 2022-04-22 | End: 2022-04-18

## 2022-04-17 RX ORDER — DEXAMETHASONE 0.5 MG/5ML
3 ELIXIR ORAL EVERY 8 HOURS
Refills: 0 | Status: DISCONTINUED | OUTPATIENT
Start: 2022-04-19 | End: 2022-04-18

## 2022-04-17 RX ORDER — SENNA PLUS 8.6 MG/1
2 TABLET ORAL AT BEDTIME
Refills: 0 | Status: DISCONTINUED | OUTPATIENT
Start: 2022-04-17 | End: 2022-04-18

## 2022-04-17 RX ADMIN — LIDOCAINE 1 PATCH: 4 CREAM TOPICAL at 18:23

## 2022-04-17 RX ADMIN — Medication 4 MILLIGRAM(S): at 12:16

## 2022-04-17 RX ADMIN — OXYCODONE HYDROCHLORIDE 5 MILLIGRAM(S): 5 TABLET ORAL at 21:14

## 2022-04-17 RX ADMIN — CYCLOBENZAPRINE HYDROCHLORIDE 5 MILLIGRAM(S): 10 TABLET, FILM COATED ORAL at 15:49

## 2022-04-17 RX ADMIN — OXYCODONE HYDROCHLORIDE 5 MILLIGRAM(S): 5 TABLET ORAL at 02:00

## 2022-04-17 RX ADMIN — OXYCODONE HYDROCHLORIDE 5 MILLIGRAM(S): 5 TABLET ORAL at 22:00

## 2022-04-17 RX ADMIN — PANTOPRAZOLE SODIUM 40 MILLIGRAM(S): 20 TABLET, DELAYED RELEASE ORAL at 05:33

## 2022-04-17 RX ADMIN — CYCLOBENZAPRINE HYDROCHLORIDE 5 MILLIGRAM(S): 10 TABLET, FILM COATED ORAL at 05:33

## 2022-04-17 RX ADMIN — Medication 4 MILLIGRAM(S): at 18:13

## 2022-04-17 RX ADMIN — SODIUM CHLORIDE 500 MILLILITER(S): 9 INJECTION INTRAMUSCULAR; INTRAVENOUS; SUBCUTANEOUS at 14:17

## 2022-04-17 RX ADMIN — GABAPENTIN 200 MILLIGRAM(S): 400 CAPSULE ORAL at 18:13

## 2022-04-17 RX ADMIN — LIDOCAINE 1 PATCH: 4 CREAM TOPICAL at 12:17

## 2022-04-17 RX ADMIN — ENOXAPARIN SODIUM 40 MILLIGRAM(S): 100 INJECTION SUBCUTANEOUS at 05:32

## 2022-04-17 RX ADMIN — SENNA PLUS 2 TABLET(S): 8.6 TABLET ORAL at 21:15

## 2022-04-17 RX ADMIN — Medication 4 MILLIGRAM(S): at 23:48

## 2022-04-17 RX ADMIN — OXYCODONE HYDROCHLORIDE 5 MILLIGRAM(S): 5 TABLET ORAL at 09:45

## 2022-04-17 RX ADMIN — GABAPENTIN 200 MILLIGRAM(S): 400 CAPSULE ORAL at 05:33

## 2022-04-17 RX ADMIN — SENNA PLUS 2 TABLET(S): 8.6 TABLET ORAL at 01:14

## 2022-04-17 RX ADMIN — OXYCODONE HYDROCHLORIDE 5 MILLIGRAM(S): 5 TABLET ORAL at 09:21

## 2022-04-17 RX ADMIN — OXYCODONE HYDROCHLORIDE 5 MILLIGRAM(S): 5 TABLET ORAL at 01:14

## 2022-04-17 NOTE — PROGRESS NOTE ADULT - ASSESSMENT
30 YO female stable POD # 1 S/P L4-5 rebeca laminectomy for discectomy  4/17: pain control and treat constipation

## 2022-04-17 NOTE — PROGRESS NOTE ADULT - SUBJECTIVE AND OBJECTIVE BOX
No acute issues overnight    Vital Signs Last 24 Hrs  ICU Vital Signs Last 24 Hrs  T(C): 36.5 (17 Apr 2022 01:40), Max: 37.1 (16 Apr 2022 05:08)  T(F): 97.7 (17 Apr 2022 01:40), Max: 98.7 (16 Apr 2022 05:08)  HR: 78 (17 Apr 2022 01:40) (64 - 87)  BP: 107/57 (17 Apr 2022 01:40) (105/67 - 119/74)  BP(mean): --  ABP: --  ABP(mean): --  RR: 16 (17 Apr 2022 01:40) (16 - 18)  SpO2: 100% (17 Apr 2022 01:40) (97% - 100%)      AAO X 3  PERRLA, EOMI  Face symmetric  UE strength 5/5  RLE 4/5 pain limited, LLR 5/5  SILT    MEDICATIONS  (STANDING):  acetaminophen     Tablet .. 500 milliGRAM(s) Oral every 6 hours  gabapentin 200 milliGRAM(s) Oral every 12 hours  lactated ringers. 500 milliLiter(s) (500 mL/Hr) IV Continuous <Continuous>  lidocaine   4% Patch 1 Patch Transdermal daily  pantoprazole    Tablet 40 milliGRAM(s) Oral before breakfast  sodium chloride 0.9%. 1000 milliLiter(s) (75 mL/Hr) IV Continuous <Continuous>    MEDICATIONS  (PRN):  cyclobenzaprine 5 milliGRAM(s) Oral three times a day PRN Muscle Spasm  HYDROmorphone  Injectable 1 milliGRAM(s) IV Push every 4 hours PRN Severe Pain (7 - 10)  oxyCODONE    IR 5 milliGRAM(s) Oral every 6 hours PRN Moderate Pain (4 - 6)                          11.2   6.90  )-----------( 258      ( 15 Apr 2022 05:56 )             35.2     04-15    137  |  106  |  18  ----------------------------<  145<H>  4.5   |  18<L>  |  0.56    Ca    9.3      15 Apr 2022 05:56  Phos  2.3     04-15  Mg     2.10     04-15

## 2022-04-17 NOTE — PROGRESS NOTE ADULT - PROBLEM SELECTOR PLAN 1
ANTICOAGULATION FOLLOW-UP CLINIC VISIT    Patient Name:  Yogesh Abreu  Date:  1/22/2019  Contact Type:  Telephone/ patient    SUBJECTIVE:     Patient Findings     Positives:   Change in diet/appetite (drinking more ETOH (1 daily) while in [place])           OBJECTIVE    INR   Date Value Ref Range Status   01/22/2019 3.2  Final     Chromogenic Factor 10   Date Value Ref Range Status   10/12/2015 28 (L) 70 - 130 % Final     Comment:     Therapeutic Range:  A Chromogenic Factor 10 level of approximately 20-40%   inversely correlates with an INR of 2-3 for patients receiving Warfarin.   Chromogenic Factor 10 levels below 20% indicate an INR greater than 3 and   levels above 40% indicate an INR less than 2.         ASSESSMENT / PLAN  INR assessment SUPRA    Recheck INR In: 2 WEEKS    INR Location Home INR    Billed home INR No      Anticoagulation Summary  As of 1/22/2019    INR goal:   2.0-3.0   TTR:   76.1 % (2.6 y)   INR used for dosing:   3.2! (1/22/2019)   Warfarin maintenance plan:   7.5 mg (5 mg x 1.5) every day   Full warfarin instructions:   7.5 mg every day   Weekly warfarin total:   52.5 mg   Plan last modified:   Tanja Wills RN (1/22/2019)   Next INR check:   2/5/2019   Target end date:   Indefinite    Indications    Long-term (current) use of anticoagulants [Z79.01] [Z79.01]  PE (pulmonary embolism) [I26.99]             Anticoagulation Episode Summary     INR check location:       Preferred lab:       Send INR reminders to:   [facility] HEART INR NURSE    Comments:         Anticoagulation Care Providers     Provider Role Specialty Phone number    Satya Rogers MD [facility] Cardiology [phone]            See the Encounter Report to view Anticoagulation Flowsheet and Dosing Calendar (Go to Encounters tab in chart review, and find the Anticoagulation Therapy Visit)    INR 3.2 Spoke with pt via phone. He has been drinking more ETOH while in [place] (1 ETOH beverage daily) and plans to continue that  Pain control  Increase activity  Discharge planning amount for awhile. No change in diet or meds. No abnormal bleeding or bruising. Will decrease dosing by 2.5 mg/wk - take 7.5 mg daily and recheck in 2 weeks. He will eat an extra salad today then resume usual amount of greens. He noted one brief episode of -160 (asymptomatic) while exercising. Advised that if he has sustained episode of fast HR or symptomatic episodes then he would need to discuss with MD. Dosage adjustment made based on physician directed care plan.    Tanja Wills RN

## 2022-04-17 NOTE — PROGRESS NOTE ADULT - SUBJECTIVE AND OBJECTIVE BOX
POST ANESTHESIA EVALUATION    31y Female POSTOP DAY 2 S/P L4-L5 Laminectomy     MENTAL STATUS: Patient participation [ x ] Awake     [  ] Arousable     [  ] Sedated    AIRWAY PATENCY: [ x ] Satisfactory  [  ] Other:     Vital Signs Last 24 Hrs  T(C): 36.7 (17 Apr 2022 13:48), Max: 36.8 (17 Apr 2022 05:30)  T(F): 98 (17 Apr 2022 13:48), Max: 98.3 (17 Apr 2022 05:30)  HR: 69 (17 Apr 2022 13:48) (63 - 88)  BP: 95/55 (17 Apr 2022 13:48) (95/55 - 121/77)  BP(mean): --  RR: 17 (17 Apr 2022 13:48) (16 - 18)  SpO2: 98% (17 Apr 2022 13:48) (95% - 100%)  I&O's Summary    16 Apr 2022 07:01  -  17 Apr 2022 07:00  --------------------------------------------------------  IN: 0 mL / OUT: 800 mL / NET: -800 mL    17 Apr 2022 07:01  -  17 Apr 2022 15:48  --------------------------------------------------------  IN: 960 mL / OUT: 1600 mL / NET: -640 mL          NAUSEA/ VOMITTING:  [ x ] NONE  [  ] CONTROLLED [  ] OTHER     PAIN: [x  ] CONTROLLED WITH CURRENT REGIMEN  [  ] OTHER    [  x] NO APPARENT ANESTHESIA COMPLICATIONS      Comments:

## 2022-04-17 NOTE — PROVIDER CONTACT NOTE (FALL NOTIFICATION) - ASSESSMENT
Primary RN called to room by unit receptionist who came to the room as soon as call bell was pressed because she was crying and not understandable over call bell. Pt crying stating she called for help and no one came to help, she was dizzy and wanted water and to use the bathroom so she states she got up by herself, fell the first time, got up and walked to the bathroom and fell a second time on her L hip, denies hitting her head/LOC. Pt states she dragged herself to the bed and pressed the call bell and that's "finally when someone came to help." Primary RN called to room by unit receptionist who came to the room as soon as call bell was pressed because she was crying and not understandable over call bell. Pt crying stating she called for help and no one came to help, she was dizzy and wanted water and to use the bathroom so she states she got up by herself, fell the first time, got up and walked to the bathroom and fell a second time on her L hip, denies hitting her head/LOC. Pt states she dragged herself to the bed and pressed the call bell and that's "finally when someone came to help." Prior to using the , pt states she fell in between the chair and bed, pointed to exactly where she fell. Then using the  service ([facility] #[id number]), pt then stated she fell twice, second time being in the bathroom.

## 2022-04-18 ENCOUNTER — TRANSCRIPTION ENCOUNTER (OUTPATIENT)
Age: 32
End: 2022-04-18

## 2022-04-18 VITALS
HEART RATE: 73 BPM | RESPIRATION RATE: 18 BRPM | OXYGEN SATURATION: 96 % | SYSTOLIC BLOOD PRESSURE: 103 MMHG | DIASTOLIC BLOOD PRESSURE: 62 MMHG | TEMPERATURE: 98 F

## 2022-04-18 PROCEDURE — 99232 SBSQ HOSP IP/OBS MODERATE 35: CPT

## 2022-04-18 RX ORDER — MAGNESIUM HYDROXIDE 400 MG/1
30 TABLET, CHEWABLE ORAL DAILY
Refills: 0 | Status: DISCONTINUED | OUTPATIENT
Start: 2022-04-18 | End: 2022-04-18

## 2022-04-18 RX ORDER — POLYETHYLENE GLYCOL 3350 17 G/17G
17 POWDER, FOR SOLUTION ORAL
Qty: 0 | Refills: 0 | DISCHARGE
Start: 2022-04-18

## 2022-04-18 RX ORDER — CYCLOBENZAPRINE HYDROCHLORIDE 10 MG/1
1 TABLET, FILM COATED ORAL
Qty: 0 | Refills: 0 | DISCHARGE
Start: 2022-04-18

## 2022-04-18 RX ORDER — PANTOPRAZOLE SODIUM 20 MG/1
1 TABLET, DELAYED RELEASE ORAL
Qty: 5 | Refills: 0
Start: 2022-04-18 | End: 2022-04-22

## 2022-04-18 RX ORDER — OXYCODONE HYDROCHLORIDE 5 MG/1
1 TABLET ORAL
Qty: 0 | Refills: 0 | DISCHARGE
Start: 2022-04-18

## 2022-04-18 RX ORDER — PANTOPRAZOLE SODIUM 20 MG/1
1 TABLET, DELAYED RELEASE ORAL
Qty: 0 | Refills: 0 | DISCHARGE
Start: 2022-04-18

## 2022-04-18 RX ORDER — OXYCODONE HYDROCHLORIDE 5 MG/1
1 TABLET ORAL
Qty: 20 | Refills: 0
Start: 2022-04-18 | End: 2022-04-22

## 2022-04-18 RX ORDER — GABAPENTIN 400 MG/1
2 CAPSULE ORAL
Qty: 0 | Refills: 0 | DISCHARGE
Start: 2022-04-18

## 2022-04-18 RX ORDER — POLYETHYLENE GLYCOL 3350 17 G/17G
17 POWDER, FOR SOLUTION ORAL DAILY
Refills: 0 | Status: DISCONTINUED | OUTPATIENT
Start: 2022-04-18 | End: 2022-04-18

## 2022-04-18 RX ORDER — DEXAMETHASONE 0.5 MG/5ML
3 ELIXIR ORAL
Qty: 16 | Refills: 0
Start: 2022-04-18 | End: 2022-04-22

## 2022-04-18 RX ORDER — SENNA PLUS 8.6 MG/1
2 TABLET ORAL
Qty: 0 | Refills: 0 | DISCHARGE
Start: 2022-04-18

## 2022-04-18 RX ORDER — GABAPENTIN 400 MG/1
2 CAPSULE ORAL
Qty: 56 | Refills: 0
Start: 2022-04-18 | End: 2022-05-01

## 2022-04-18 RX ORDER — CYCLOBENZAPRINE HYDROCHLORIDE 10 MG/1
1 TABLET, FILM COATED ORAL
Qty: 21 | Refills: 0
Start: 2022-04-18 | End: 2022-04-24

## 2022-04-18 RX ADMIN — LIDOCAINE 1 PATCH: 4 CREAM TOPICAL at 00:04

## 2022-04-18 RX ADMIN — CYCLOBENZAPRINE HYDROCHLORIDE 5 MILLIGRAM(S): 10 TABLET, FILM COATED ORAL at 05:14

## 2022-04-18 RX ADMIN — OXYCODONE HYDROCHLORIDE 5 MILLIGRAM(S): 5 TABLET ORAL at 14:14

## 2022-04-18 RX ADMIN — CYCLOBENZAPRINE HYDROCHLORIDE 5 MILLIGRAM(S): 10 TABLET, FILM COATED ORAL at 09:58

## 2022-04-18 RX ADMIN — GABAPENTIN 200 MILLIGRAM(S): 400 CAPSULE ORAL at 05:14

## 2022-04-18 RX ADMIN — OXYCODONE HYDROCHLORIDE 5 MILLIGRAM(S): 5 TABLET ORAL at 14:44

## 2022-04-18 RX ADMIN — Medication 4 MILLIGRAM(S): at 12:02

## 2022-04-18 RX ADMIN — LIDOCAINE 1 PATCH: 4 CREAM TOPICAL at 12:02

## 2022-04-18 RX ADMIN — ENOXAPARIN SODIUM 40 MILLIGRAM(S): 100 INJECTION SUBCUTANEOUS at 05:14

## 2022-04-18 RX ADMIN — PANTOPRAZOLE SODIUM 40 MILLIGRAM(S): 20 TABLET, DELAYED RELEASE ORAL at 05:14

## 2022-04-18 RX ADMIN — POLYETHYLENE GLYCOL 3350 17 GRAM(S): 17 POWDER, FOR SOLUTION ORAL at 12:01

## 2022-04-18 RX ADMIN — Medication 4 MILLIGRAM(S): at 05:14

## 2022-04-18 RX ADMIN — MAGNESIUM HYDROXIDE 30 MILLILITER(S): 400 TABLET, CHEWABLE ORAL at 12:01

## 2022-04-18 NOTE — PROGRESS NOTE ADULT - SUBJECTIVE AND OBJECTIVE BOX
No acute issues overnight    ICU Vital Signs Last 24 Hrs  T(C): 36.5 (18 Apr 2022 05:11), Max: 37 (17 Apr 2022 17:58)  T(F): 97.7 (18 Apr 2022 05:11), Max: 98.6 (17 Apr 2022 17:58)  HR: 66 (18 Apr 2022 05:11) (66 - 88)  BP: 113/58 (18 Apr 2022 05:11) (95/55 - 121/77)  BP(mean): --  ABP: --  ABP(mean): --  RR: 16 (18 Apr 2022 05:11) (16 - 18)  SpO2: 98% (18 Apr 2022 05:11) (95% - 99%)    AAO X 3  PERRLA, EOMI  Face symmetric  UE strength 5/5  RLE 4/5 pain limited, LLR 5/5  SILT    MEDICATIONS  (STANDING):  acetaminophen     Tablet .. 500 milliGRAM(s) Oral every 6 hours  gabapentin 200 milliGRAM(s) Oral every 12 hours  lactated ringers. 500 milliLiter(s) (500 mL/Hr) IV Continuous <Continuous>  lidocaine   4% Patch 1 Patch Transdermal daily  pantoprazole    Tablet 40 milliGRAM(s) Oral before breakfast  sodium chloride 0.9%. 1000 milliLiter(s) (75 mL/Hr) IV Continuous <Continuous>    MEDICATIONS  (PRN):  cyclobenzaprine 5 milliGRAM(s) Oral three times a day PRN Muscle Spasm  HYDROmorphone  Injectable 1 milliGRAM(s) IV Push every 4 hours PRN Severe Pain (7 - 10)  oxyCODONE    IR 5 milliGRAM(s) Oral every 6 hours PRN Moderate Pain (4 - 6)                          11.2   6.90  )-----------( 258      ( 15 Apr 2022 05:56 )             35.2     04-15    137  |  106  |  18  ----------------------------<  145<H>  4.5   |  18<L>  |  0.56    Ca    9.3      15 Apr 2022 05:56  Phos  2.3     04-15  Mg     2.10     04-15      IMPRESSION:  Transitional lumbosacral vertebral body with postoperative   changes at the L4-L5 level.    Prominent ventral epidural soft tissue at the L4-L5 level more notable on   the right which may represent postoperative change although the   possibility of residual disc material is not excluded. Clinical   correlation will determine the need for contrast enhanced lumbar spine MR   for further evaluation.

## 2022-04-18 NOTE — PROGRESS NOTE ADULT - PROBLEM SELECTOR PROBLEM 1
Lumbar herniated disc
S/P lumbar laminectomy
S/P lumbar laminectomy
Lumbar herniated disc
Lumbar herniated disc
S/P lumbar laminectomy

## 2022-04-18 NOTE — DISCHARGE NOTE NURSING/CASE MANAGEMENT/SOCIAL WORK - NSDCPEFALRISK_GEN_ALL_CORE
For information on Fall & Injury Prevention, visit: [email or web address] OR  [email or web address] OR  https://www.cdc.gov/steadi/patient.html

## 2022-04-18 NOTE — DISCHARGE NOTE NURSING/CASE MANAGEMENT/SOCIAL WORK - NSDPDISTO_GEN_ALL_CORE
Pt. is afebrile and offers no complaints. In no acute distress. Lower back dressing: clean, dry and intact. Pt is ambulating, tolerating diet well, and voiding in adequate amounts./Home

## 2022-04-18 NOTE — DISCHARGE NOTE NURSING/CASE MANAGEMENT/SOCIAL WORK - NSDCPNINST_GEN_ALL_CORE
Make a follow up appointment with Dr. Butcher. Call MD if you develop a fever, or if there is redness, swelling, drainage or pain not relieved by pain medication. No heavy lifting, bending, or straining to move your bowels. Take over the counter stool softeners as needed to prevent constipation which may be caused by pain medication.

## 2022-04-18 NOTE — PROGRESS NOTE ADULT - SUBJECTIVE AND OBJECTIVE BOX
Diana Platt MD  [facility] Medicine  Pager [phone] (M-F 8AM-5PM)  Other Times: [phone]    Patient is a 31y old  Female who presents with a chief complaint of Back Pain (18 Apr 2022 05:42)    SUBJECTIVE / OVERNIGHT EVENTS: no acute events overnight    MEDICATIONS  (STANDING):  dexAMETHasone     Tablet   Oral   dexAMETHasone     Tablet 4 milliGRAM(s) Oral every 6 hours  enoxaparin Injectable 40 milliGRAM(s) SubCutaneous every 24 hours  gabapentin 200 milliGRAM(s) Oral every 12 hours  lidocaine   4% Patch 1 Patch Transdermal daily  pantoprazole    Tablet 40 milliGRAM(s) Oral before breakfast  polyethylene glycol 3350 17 Gram(s) Oral daily  senna 2 Tablet(s) Oral at bedtime    MEDICATIONS  (PRN):  cyclobenzaprine 5 milliGRAM(s) Oral three times a day PRN Muscle Spasm  magnesium hydroxide Suspension 30 milliLiter(s) Oral daily PRN Constipation  oxyCODONE    IR 5 milliGRAM(s) Oral every 6 hours PRN Moderate Pain (4 - 6)      PHYSICAL EXAM:  Vital Signs Last 24 Hrs  T(C): 36.7 (18 Apr 2022 09:15), Max: 37 (17 Apr 2022 17:58)  T(F): 98.1 (18 Apr 2022 09:15), Max: 98.6 (17 Apr 2022 17:58)  HR: 73 (18 Apr 2022 09:15) (66 - 73)  BP: 108/59 (18 Apr 2022 09:15) (95/55 - 119/77)  BP(mean): --  RR: 17 (18 Apr 2022 09:15) (16 - 18)  SpO2: 98% (18 Apr 2022 09:15) (96% - 99%)    CONSTITUTIONAL: NAD, well-developed, well-groomed  RESPIRATORY: Normal respiratory effort; lungs are clear to auscultation bilaterally  CARDIOVASCULAR: Regular rate and rhythm, normal S1 and S2, no murmur/rub/gallop; No lower extremity edema  GASTROINTESTINAL: Nontender to palpation, normoactive bowel sounds, no rebound/guarding; No hepatosplenomegaly  MUSCULOSKELETAL:  no clubbing or cyanosis of digits; no joint swelling or tenderness to palpation  NEUROLOGY: non-focal; no gross sensory deficits   PSYCH: A+O to person, place, and time; affect appropriate  SKIN: No rashes; warm     LABS:                      RADIOLOGY & ADDITIONAL TESTS:  Results Reviewed:   Imaging Personally Reviewed:  Electrocardiogram Personally Reviewed:    COORDINATION OF CARE:  Care Discussed with Consultants/Other Providers [Y/N]:  Prior or Outpatient Records Reviewed [Y/N]:

## 2022-04-18 NOTE — PROGRESS NOTE ADULT - PROBLEM SELECTOR PLAN 1
presented with back pain and inability to ambulate  - outpt MRI showed L5 nerve root compression  - s/p L4-L5 rebeca laminectomy, discectomy (OR 4/15).  - post op care per neurosurgery   - pain control + bowel regimen

## 2022-04-18 NOTE — PROGRESS NOTE ADULT - PROVIDER SPECIALTY LIST ADULT
Neurosurgery
Neurosurgery
Anesthesia
Internal Medicine
Neurosurgery
Hospitalist
Neurosurgery
Neurosurgery
Internal Medicine

## 2022-04-18 NOTE — DISCHARGE NOTE NURSING/CASE MANAGEMENT/SOCIAL WORK - PATIENT PORTAL LINK FT
You can access the FollowMyHealth Patient Portal offered by [facility] by registering at the following website: [email or web address]. By joining [facility]’s FollowMyHealth portal, you will also be able to view your health information using other applications (apps) compatible with our system.

## 2022-04-18 NOTE — PROGRESS NOTE ADULT - ASSESSMENT
31F, Slovenian/some English speaking, with hx of L5 herniation, who presents with back pain and inability to ambulate. Initially admitted to medicine service for pain control, now on neurosx service s/p L4-L5 rebeca laminectomy, discectomy (OR 4/15).

## 2022-04-18 NOTE — PROGRESS NOTE ADULT - PROBLEM SELECTOR PLAN 2
- outpt record showed elevated LDL, pt has low ascvd risk, no statin  - monitor for now
- outpt record showed elevated LDL, pt has low ascvd risk, no statin  - monitor for now
Outpt record showed elevated LDL  - no indication for statin

## 2022-04-20 ENCOUNTER — NON-APPOINTMENT (OUTPATIENT)
Age: 32
End: 2022-04-20

## 2022-04-20 LAB — SURGICAL PATHOLOGY STUDY: SIGNIFICANT CHANGE UP

## 2022-04-22 ENCOUNTER — APPOINTMENT (OUTPATIENT)
Dept: ORTHOPEDIC SURGERY | Facility: CLINIC | Age: 32
End: 2022-04-22

## 2022-04-26 ENCOUNTER — APPOINTMENT (OUTPATIENT)
Dept: INTERNAL MEDICINE | Facility: CLINIC | Age: 32
End: 2022-04-26

## 2022-04-27 ENCOUNTER — APPOINTMENT (OUTPATIENT)
Dept: NEUROSURGERY | Facility: CLINIC | Age: 32
End: 2022-04-27
Payer: MEDICAID

## 2022-04-27 VITALS
WEIGHT: 158.73 LBS | HEIGHT: 62 IN | HEART RATE: 92 BPM | OXYGEN SATURATION: 96 % | BODY MASS INDEX: 29.21 KG/M2 | SYSTOLIC BLOOD PRESSURE: 118 MMHG | DIASTOLIC BLOOD PRESSURE: 84 MMHG

## 2022-04-27 PROCEDURE — 99024 POSTOP FOLLOW-UP VISIT: CPT

## 2022-04-27 RX ORDER — AMMONIUM LACTATE 12 %
12 CREAM (GRAM) TOPICAL
Qty: 1 | Refills: 11 | Status: DISCONTINUED | COMMUNITY
Start: 2021-11-23 | End: 2022-04-27

## 2022-04-27 RX ORDER — TIZANIDINE 2 MG/1
2 TABLET ORAL EVERY 8 HOURS
Qty: 40 | Refills: 0 | Status: DISCONTINUED | COMMUNITY
Start: 2021-08-25 | End: 2022-04-27

## 2022-04-27 RX ORDER — DICLOFENAC SODIUM 50 MG/1
50 TABLET, DELAYED RELEASE ORAL
Qty: 60 | Refills: 0 | Status: DISCONTINUED | COMMUNITY
Start: 2021-08-25 | End: 2022-04-27

## 2022-04-29 NOTE — ASSESSMENT
[FreeTextEntry1] : Jerrica Rodríguez is a 31 year old woman S/P Lumbar decompression surgery cauda equina syndrome.\par Doing well with residual pain. Wants to hold off from pain management at this time.\par She will follow up in 3 months\par Doing well post op \par Wound edges approximated without signs of infection.\par Pain Well controlled with post op medications.\par \par \par Please see Dr. Butcher's dictation for details.\par I, Dr. Efren Butcher evaluated the patient with the nurse practitioner Osei Deutsch and established the plan of care. I personally discuss this patient with the nurse practitioner at the time of the visit. I agree with the assessment and plan as written, unless noted below.\par \par \par

## 2022-04-29 NOTE — REASON FOR VISIT
[de-identified] : S/P L4-L5 Decompression [de-identified] : 4/15/22 [de-identified] : Today she reports that she is doing well. Pain remains somewhat problematic but is getting better.\par She ambulates without difficulty. He lumbar incision is well healed  [Interpreters_IDNumber] : [id number] [Interpreters_FullName] : Loida [FreeTextEntry3] : Juan C [TWNoteComboBox1] : Other

## 2022-04-29 NOTE — PHYSICAL EXAM
[General Appearance - Alert] : alert [General Appearance - In No Acute Distress] : in no acute distress [Longitudinal] : longitudinal [FreeTextEntry1] : LUMBAR SPINE [Oriented To Time, Place, And Person] : oriented to person, place, and time [No Visual Abnormalities] : no visible abnormailities [] : no respiratory distress [Heart Rate And Rhythm] : heart rate was normal and rhythm regular [Abnormal Walk] : normal gait

## 2022-05-03 ENCOUNTER — NON-APPOINTMENT (OUTPATIENT)
Age: 32
End: 2022-05-03

## 2022-05-03 ENCOUNTER — APPOINTMENT (OUTPATIENT)
Dept: INTERNAL MEDICINE | Facility: CLINIC | Age: 32
End: 2022-05-03
Payer: MEDICAID

## 2022-05-03 VITALS
BODY MASS INDEX: 28.34 KG/M2 | SYSTOLIC BLOOD PRESSURE: 120 MMHG | WEIGHT: 154 LBS | HEIGHT: 62 IN | DIASTOLIC BLOOD PRESSURE: 80 MMHG

## 2022-05-03 PROCEDURE — 90471 IMMUNIZATION ADMIN: CPT

## 2022-05-03 PROCEDURE — 90715 TDAP VACCINE 7 YRS/> IM: CPT

## 2022-05-03 PROCEDURE — 93000 ELECTROCARDIOGRAM COMPLETE: CPT

## 2022-05-03 PROCEDURE — 99395 PREV VISIT EST AGE 18-39: CPT | Mod: 25

## 2022-05-03 NOTE — ASSESSMENT
[FreeTextEntry1] : S/P lumbar surgery 3 weeks ago for severe herniated disc and cauda equina syndrome-per neurosurgeon\par LFT\par Lipid\par Hemoglobin A1c\par TFT\par Tdap\par List of [facility]-given to patient\par Blood was drawn at office today.\par

## 2022-05-03 NOTE — PHYSICAL EXAM
[No Acute Distress] : no acute distress [Well-Appearing] : well-appearing [No Respiratory Distress] : no respiratory distress  [No Accessory Muscle Use] : no accessory muscle use [Clear to Auscultation] : lungs were clear to auscultation bilaterally [Normal Rate] : normal rate  [Regular Rhythm] : with a regular rhythm [No Edema] : there was no peripheral edema [Soft] : abdomen soft [Non Tender] : non-tender [Normal Posterior Cervical Nodes] : no posterior cervical lymphadenopathy [Normal Anterior Cervical Nodes] : no anterior cervical lymphadenopathy [Speech Grossly Normal] : speech grossly normal [Normal Affect] : the affect was normal [Normal Mood] : the mood was normal

## 2022-05-06 ENCOUNTER — NON-APPOINTMENT (OUTPATIENT)
Age: 32
End: 2022-05-06

## 2022-05-06 LAB
25(OH)D3 SERPL-MCNC: 15.6 NG/ML
ALBUMIN SERPL ELPH-MCNC: 4.5 G/DL
ALP BLD-CCNC: 118 U/L
ALT SERPL-CCNC: 79 U/L
ANION GAP SERPL CALC-SCNC: 14 MMOL/L
AST SERPL-CCNC: 20 U/L
BASOPHILS # BLD AUTO: 0.03 K/UL
BASOPHILS NFR BLD AUTO: 0.3 %
BILIRUB SERPL-MCNC: 1 MG/DL
BUN SERPL-MCNC: 11 MG/DL
CALCIUM SERPL-MCNC: 9.4 MG/DL
CHLORIDE SERPL-SCNC: 104 MMOL/L
CHOLEST SERPL-MCNC: 292 MG/DL
CO2 SERPL-SCNC: 21 MMOL/L
CREAT SERPL-MCNC: 0.44 MG/DL
EGFR: 134 ML/MIN/1.73M2
EOSINOPHIL # BLD AUTO: 0.11 K/UL
EOSINOPHIL NFR BLD AUTO: 1.3 %
ESTIMATED AVERAGE GLUCOSE: 120 MG/DL
GLUCOSE SERPL-MCNC: 92 MG/DL
HBA1C MFR BLD HPLC: 5.8 %
HCT VFR BLD CALC: 37.6 %
HDLC SERPL-MCNC: 59 MG/DL
HGB BLD-MCNC: 12 G/DL
IMM GRANULOCYTES NFR BLD AUTO: 0.3 %
LDLC SERPL CALC-MCNC: 204 MG/DL
LYMPHOCYTES # BLD AUTO: 2.79 K/UL
LYMPHOCYTES NFR BLD AUTO: 31.9 %
MAN DIFF?: NORMAL
MCHC RBC-ENTMCNC: 28.8 PG
MCHC RBC-ENTMCNC: 31.9 GM/DL
MCV RBC AUTO: 90.2 FL
MONOCYTES # BLD AUTO: 0.45 K/UL
MONOCYTES NFR BLD AUTO: 5.1 %
NEUTROPHILS # BLD AUTO: 5.33 K/UL
NEUTROPHILS NFR BLD AUTO: 61.1 %
NONHDLC SERPL-MCNC: 233 MG/DL
PLATELET # BLD AUTO: 214 K/UL
POTASSIUM SERPL-SCNC: 4 MMOL/L
PROT SERPL-MCNC: 7.2 G/DL
RBC # BLD: 4.17 M/UL
RBC # FLD: 13.1 %
SODIUM SERPL-SCNC: 139 MMOL/L
T4 FREE SERPL-MCNC: 1.2 NG/DL
TRIGL SERPL-MCNC: 145 MG/DL
TSH SERPL-ACNC: 5.33 UIU/ML
WBC # FLD AUTO: 8.74 K/UL

## 2022-06-07 ENCOUNTER — INPATIENT (INPATIENT)
Facility: HOSPITAL | Age: 32
LOS: 2 days | Discharge: HOME | End: 2022-06-10
Attending: HOSPITALIST | Admitting: HOSPITALIST
Payer: MEDICAID

## 2022-06-07 ENCOUNTER — NON-APPOINTMENT (OUTPATIENT)
Age: 32
End: 2022-06-07

## 2022-06-07 VITALS
HEART RATE: 89 BPM | DIASTOLIC BLOOD PRESSURE: 69 MMHG | OXYGEN SATURATION: 100 % | TEMPERATURE: 97 F | RESPIRATION RATE: 18 BRPM | SYSTOLIC BLOOD PRESSURE: 116 MMHG | HEIGHT: 62 IN

## 2022-06-07 DIAGNOSIS — M54.9 DORSALGIA, UNSPECIFIED: ICD-10-CM

## 2022-06-07 DIAGNOSIS — R10.9 UNSPECIFIED ABDOMINAL PAIN: ICD-10-CM

## 2022-06-07 DIAGNOSIS — R07.9 CHEST PAIN, UNSPECIFIED: ICD-10-CM

## 2022-06-07 DIAGNOSIS — Z29.9 ENCOUNTER FOR PROPHYLACTIC MEASURES, UNSPECIFIED: ICD-10-CM

## 2022-06-07 DIAGNOSIS — Z98.890 OTHER SPECIFIED POSTPROCEDURAL STATES: Chronic | ICD-10-CM

## 2022-06-07 DIAGNOSIS — Z90.49 ACQUIRED ABSENCE OF OTHER SPECIFIED PARTS OF DIGESTIVE TRACT: Chronic | ICD-10-CM

## 2022-06-07 LAB
ALBUMIN SERPL ELPH-MCNC: 4.1 G/DL — SIGNIFICANT CHANGE UP (ref 3.3–5)
ALP SERPL-CCNC: 128 U/L — HIGH (ref 40–120)
ALT FLD-CCNC: 72 U/L — HIGH (ref 4–33)
ANION GAP SERPL CALC-SCNC: 12 MMOL/L — SIGNIFICANT CHANGE UP (ref 7–14)
AST SERPL-CCNC: 49 U/L — HIGH (ref 4–32)
BASOPHILS # BLD AUTO: 0.02 K/UL — SIGNIFICANT CHANGE UP (ref 0–0.2)
BASOPHILS NFR BLD AUTO: 0.3 % — SIGNIFICANT CHANGE UP (ref 0–2)
BILIRUB SERPL-MCNC: 1 MG/DL — SIGNIFICANT CHANGE UP (ref 0.2–1.2)
BUN SERPL-MCNC: 13 MG/DL — SIGNIFICANT CHANGE UP (ref 7–23)
CALCIUM SERPL-MCNC: 8.9 MG/DL — SIGNIFICANT CHANGE UP (ref 8.4–10.5)
CHLORIDE SERPL-SCNC: 102 MMOL/L — SIGNIFICANT CHANGE UP (ref 98–107)
CO2 SERPL-SCNC: 22 MMOL/L — SIGNIFICANT CHANGE UP (ref 22–31)
CREAT SERPL-MCNC: 0.53 MG/DL — SIGNIFICANT CHANGE UP (ref 0.5–1.3)
D DIMER BLD IA.RAPID-MCNC: <150 NG/ML DDU — SIGNIFICANT CHANGE UP
EGFR: 128 ML/MIN/1.73M2 — SIGNIFICANT CHANGE UP
EOSINOPHIL # BLD AUTO: 0.12 K/UL — SIGNIFICANT CHANGE UP (ref 0–0.5)
EOSINOPHIL NFR BLD AUTO: 1.5 % — SIGNIFICANT CHANGE UP (ref 0–6)
FLUAV AG NPH QL: SIGNIFICANT CHANGE UP
FLUBV AG NPH QL: SIGNIFICANT CHANGE UP
GLUCOSE SERPL-MCNC: 106 MG/DL — HIGH (ref 70–99)
HCT VFR BLD CALC: 39.5 % — SIGNIFICANT CHANGE UP (ref 34.5–45)
HGB BLD-MCNC: 12.9 G/DL — SIGNIFICANT CHANGE UP (ref 11.5–15.5)
IANC: 5.64 K/UL — SIGNIFICANT CHANGE UP (ref 1.8–7.4)
IMM GRANULOCYTES NFR BLD AUTO: 0.4 % — SIGNIFICANT CHANGE UP (ref 0–1.5)
LIDOCAIN IGE QN: 15 U/L — SIGNIFICANT CHANGE UP (ref 7–60)
LYMPHOCYTES # BLD AUTO: 1.57 K/UL — SIGNIFICANT CHANGE UP (ref 1–3.3)
LYMPHOCYTES # BLD AUTO: 20 % — SIGNIFICANT CHANGE UP (ref 13–44)
MAGNESIUM SERPL-MCNC: 2 MG/DL — SIGNIFICANT CHANGE UP (ref 1.6–2.6)
MCHC RBC-ENTMCNC: 28.8 PG — SIGNIFICANT CHANGE UP (ref 27–34)
MCHC RBC-ENTMCNC: 32.7 GM/DL — SIGNIFICANT CHANGE UP (ref 32–36)
MCV RBC AUTO: 88.2 FL — SIGNIFICANT CHANGE UP (ref 80–100)
MONOCYTES # BLD AUTO: 0.48 K/UL — SIGNIFICANT CHANGE UP (ref 0–0.9)
MONOCYTES NFR BLD AUTO: 6.1 % — SIGNIFICANT CHANGE UP (ref 2–14)
NEUTROPHILS # BLD AUTO: 5.64 K/UL — SIGNIFICANT CHANGE UP (ref 1.8–7.4)
NEUTROPHILS NFR BLD AUTO: 71.7 % — SIGNIFICANT CHANGE UP (ref 43–77)
NRBC # BLD: 0 /100 WBCS — SIGNIFICANT CHANGE UP
NRBC # FLD: 0 K/UL — SIGNIFICANT CHANGE UP
NT-PROBNP SERPL-SCNC: 10 PG/ML — SIGNIFICANT CHANGE UP
PLATELET # BLD AUTO: 221 K/UL — SIGNIFICANT CHANGE UP (ref 150–400)
POTASSIUM SERPL-MCNC: 4 MMOL/L — SIGNIFICANT CHANGE UP (ref 3.5–5.3)
POTASSIUM SERPL-SCNC: 4 MMOL/L — SIGNIFICANT CHANGE UP (ref 3.5–5.3)
PROT SERPL-MCNC: 7.3 G/DL — SIGNIFICANT CHANGE UP (ref 6–8.3)
RBC # BLD: 4.48 M/UL — SIGNIFICANT CHANGE UP (ref 3.8–5.2)
RBC # FLD: 12.4 % — SIGNIFICANT CHANGE UP (ref 10.3–14.5)
RSV RNA NPH QL NAA+NON-PROBE: SIGNIFICANT CHANGE UP
SARS-COV-2 RNA SPEC QL NAA+PROBE: SIGNIFICANT CHANGE UP
SODIUM SERPL-SCNC: 136 MMOL/L — SIGNIFICANT CHANGE UP (ref 135–145)
TROPONIN T, HIGH SENSITIVITY RESULT: <6 NG/L — SIGNIFICANT CHANGE UP
WBC # BLD: 7.86 K/UL — SIGNIFICANT CHANGE UP (ref 3.8–10.5)
WBC # FLD AUTO: 7.86 K/UL — SIGNIFICANT CHANGE UP (ref 3.8–10.5)

## 2022-06-07 PROCEDURE — 99223 1ST HOSP IP/OBS HIGH 75: CPT

## 2022-06-07 PROCEDURE — 76705 ECHO EXAM OF ABDOMEN: CPT | Mod: 26

## 2022-06-07 PROCEDURE — 71045 X-RAY EXAM CHEST 1 VIEW: CPT | Mod: 26

## 2022-06-07 PROCEDURE — 71275 CT ANGIOGRAPHY CHEST: CPT | Mod: 26,MA

## 2022-06-07 PROCEDURE — 99285 EMERGENCY DEPT VISIT HI MDM: CPT

## 2022-06-07 RX ORDER — FAMOTIDINE 10 MG/ML
20 INJECTION INTRAVENOUS ONCE
Refills: 0 | Status: COMPLETED | OUTPATIENT
Start: 2022-06-07 | End: 2022-06-07

## 2022-06-07 RX ORDER — ACETAMINOPHEN 500 MG
650 TABLET ORAL EVERY 6 HOURS
Refills: 0 | Status: DISCONTINUED | OUTPATIENT
Start: 2022-06-07 | End: 2022-06-10

## 2022-06-07 RX ORDER — PANTOPRAZOLE SODIUM 20 MG/1
40 TABLET, DELAYED RELEASE ORAL DAILY
Refills: 0 | Status: DISCONTINUED | OUTPATIENT
Start: 2022-06-08 | End: 2022-06-10

## 2022-06-07 RX ORDER — OXYCODONE HYDROCHLORIDE 5 MG/1
5 TABLET ORAL EVERY 8 HOURS
Refills: 0 | Status: DISCONTINUED | OUTPATIENT
Start: 2022-06-07 | End: 2022-06-08

## 2022-06-07 RX ORDER — ACETAMINOPHEN 500 MG
650 TABLET ORAL ONCE
Refills: 0 | Status: COMPLETED | OUTPATIENT
Start: 2022-06-07 | End: 2022-06-07

## 2022-06-07 RX ORDER — OXYCODONE HYDROCHLORIDE 5 MG/1
5 TABLET ORAL ONCE
Refills: 0 | Status: DISCONTINUED | OUTPATIENT
Start: 2022-06-07 | End: 2022-06-07

## 2022-06-07 RX ORDER — GABAPENTIN 400 MG/1
0 CAPSULE ORAL
Qty: 0 | Refills: 0 | DISCHARGE

## 2022-06-07 RX ORDER — SODIUM CHLORIDE 9 MG/ML
1000 INJECTION INTRAMUSCULAR; INTRAVENOUS; SUBCUTANEOUS
Refills: 0 | Status: DISCONTINUED | OUTPATIENT
Start: 2022-06-07 | End: 2022-06-08

## 2022-06-07 RX ORDER — GABAPENTIN 400 MG/1
100 CAPSULE ORAL DAILY
Refills: 0 | Status: DISCONTINUED | OUTPATIENT
Start: 2022-06-07 | End: 2022-06-09

## 2022-06-07 RX ADMIN — FAMOTIDINE 20 MILLIGRAM(S): 10 INJECTION INTRAVENOUS at 13:45

## 2022-06-07 RX ADMIN — OXYCODONE HYDROCHLORIDE 5 MILLIGRAM(S): 5 TABLET ORAL at 20:46

## 2022-06-07 RX ADMIN — Medication 30 MILLILITER(S): at 17:59

## 2022-06-07 RX ADMIN — OXYCODONE HYDROCHLORIDE 5 MILLIGRAM(S): 5 TABLET ORAL at 21:45

## 2022-06-07 RX ADMIN — OXYCODONE HYDROCHLORIDE 5 MILLIGRAM(S): 5 TABLET ORAL at 17:59

## 2022-06-07 RX ADMIN — SODIUM CHLORIDE 100 MILLILITER(S): 9 INJECTION INTRAMUSCULAR; INTRAVENOUS; SUBCUTANEOUS at 21:55

## 2022-06-07 RX ADMIN — Medication 650 MILLIGRAM(S): at 09:55

## 2022-06-07 NOTE — ED PROVIDER NOTE - CLINICAL SUMMARY MEDICAL DECISION MAKING FREE TEXT BOX
30 yo F p/w 1 day of pleuritic cp. had procedure 7 weeks ago. no other pe risk factors. VSS. benign exam. c/f pe vs acs. plan labs cta analgesia and r/a

## 2022-06-07 NOTE — ED ADULT NURSE REASSESSMENT NOTE - NEURO MENTATION
Excessive shakiness of the whole body, chronic. Per patient she has h/o pseudoseizure   Underlying psych/behavioral issues    -continue home Depakote  -Home health for PT request placed after PT evaluation    normal

## 2022-06-07 NOTE — H&P ADULT - HISTORY OF PRESENT ILLNESS
32 y/o female with pmhx of L4-L5 rebeca laminectomy, discectomy (4/2022), prior cholecystectomy 2020 presents to the ER with 1 day history of chest and abdominal pain described as burning starting from epigastrum and radiating up. SHe usually has no burning with food intake but yday she noted that food did make her pain worse. She reports a pleuritic component to the chest discomfort and that the abd pain/chest pain did worsen when she exerted herself. No prior CAD reported. No recent travelling, fever, cough, shortness of breath, diarrhea, vomiting or dysuria. In the ER pt was given pain meds, fluids

## 2022-06-07 NOTE — H&P ADULT - ASSESSMENT
32 y/o female with pmhx of L4-L5 rebeca laminectomy, discectomy (4/2022), prior cholecystectomy 2020 presents to the ER with 1 day history of chest and abdominal pain described as burning starting from epigastrum and radiating up. Admitted for further evaluation.

## 2022-06-07 NOTE — H&P ADULT - NSHPLABSRESULTS_GEN_ALL_CORE
12.9   7.86  )-----------( 221      ( 07 Jun 2022 08:25 )             39.5       06-07    136  |  102  |  13  ----------------------------<  106<H>  4.0   |  22  |  0.53    Ca    8.9      07 Jun 2022 08:25  Mg     2.00     06-07    TPro  7.3  /  Alb  4.1  /  TBili  1.0  /  DBili  x   /  AST  49<H>  /  ALT  72<H>  /  AlkPhos  128<H>  06-07      EKG: normal sinus rhythm    CTA chest: FINDINGS:    LUNGS AND AIRWAYS: Patent central airways.  Lungs are clear.  PLEURA: No pleural effusion.  MEDIASTINUM AND TYESHA: No lymphadenopathy.  VESSELS: Limited to nondiagnostic study for evaluation of many of   subsegmental or smaller pulmonary arterial branches due to superimposed   respiratory motion artifact. No pulmonary arterial emboli are identified   within the main, lobar, segmental and well visualized smaller pulmonary   arteries. The aorta and main pulmonary artery are within normal limits.  HEART: Heart size is normal. No pericardial effusion.  CHEST WALL AND LOWER NECK: Within normal limits.  VISUALIZED UPPER ABDOMEN: Cholecystectomy.  BONES: Within normal limits.    IMPRESSION:  Limited to nondiagnostic evaluation of many of the subsegmental pulmonary   arterial branches due to superimposed respiratory motion artifact. No   pulmonary arterial emboli within the well visualized pulmonary arteries.        RUQ US: IMPRESSION:  Minimally dilated common bile duct, which may be secondary to   postcholecystectomy state. Suggest correlation with liver function tests.   Consider follow-up with MRI/MRCP.    Mildly increased liver echogenicity, which may reflect steatosis.

## 2022-06-07 NOTE — ED PROVIDER NOTE - OBJECTIVE STATEMENT
30 yo F pmhx  L4-5 rebeca laminectomy for discectomy 4/15/22 p/w 1 day of constant sharp mid sternal cp. non radiating exertional worse with deep breathing. a/w sob. no f/c cough. no hx of similar pain. has not taken meds. no leg swelling recent travel or birth control. no hx dvt or pe. no abd pain n/v/d dysuria. lmp may 4th.

## 2022-06-07 NOTE — ED ADULT TRIAGE NOTE - HEART RATE (BEATS/MIN)
89 good/Puree/thin liquids. pt unable to consume hot foods tries to consume cool soups & pudding & ice cream.

## 2022-06-07 NOTE — ED PROVIDER NOTE - PROGRESS NOTE DETAILS
Warren JOSEPH PGY1: labs elevated lft and zaida from 1 month ago. cta negative pt still in pain epigastric. RUQ minimally dilated CBD will admit pt for mrcp. Warren JOSEPH PGY1: labs elevated lft and zaida from 1 month ago. cta negative pt still in pain epigastric. RUQ minimally dilated CBD will admit pt for mrcp. spoke to hospitalist who will admit pt.

## 2022-06-07 NOTE — H&P ADULT - NSHPPHYSICALEXAM_GEN_ALL_CORE
PHYSICAL EXAM:  GENERAL: NAD, well-developed, well-nourished  HEAD:  Atraumatic, Normocephalic  EYES: EOMI, PERRLA, conjunctiva and sclera clear  NECK: Supple, No JVD  CHEST/LUNG: Clear to auscultation bilaterally; No wheezes, rales or rhonchi  HEART: Regular rate and rhythm; No murmurs, rubs, or gallops, (+)S1, S2  ABDOMEN: Soft, mild/mod ttp in epigastric area, Nondistended; Normal Bowel sounds, no rebound or guarding.    EXTREMITIES:  2+ Peripheral Pulses, No clubbing, cyanosis, or edema  PSYCH: normal mood and affect  NEUROLOGY: AAOx3, non-focal  SKIN: No rashes or lesions

## 2022-06-07 NOTE — H&P ADULT - PROBLEM SELECTOR PLAN 2
burning pain radiating from epigastric area and also pleuritic. Trop < 6, EKG NSR. CTA nondiagnostic study given respiratory motion artifact, however Ddimer negative.   -likely related to above, possible PUD. continue PPI for now

## 2022-06-07 NOTE — H&P ADULT - PROBLEM SELECTOR PLAN 1
Described as a burning epigastric pain radiating to chest. Differential includes PUD/gastritis vs biliary colic given mild dilatation of CBD and elevated LFTs (bili higher than previously)  -s/p pepcid, continue IV PPI for now in AM  -will obtain MRCP to further evaluate biliary tree  -lipase wnl. no diarrhea reported. check urinarlysis

## 2022-06-07 NOTE — ED ADULT TRIAGE NOTE - CHIEF COMPLAINT QUOTE
Pt arrives with CP and SOB x1 day. Pt states pain is localized to R side of chest. Endorsing lightheadedness. Denies PMHX.

## 2022-06-07 NOTE — ED ADULT NURSE NOTE - OBJECTIVE STATEMENT
Pt received to intake 4 , awake and alert, A&OX4, ambulatory. C/o upper middle abd pain since 2345 last night. Reports feeling nauseous, denies vomiting. Respirations even and unlabored. Resting comfortably. Denies CP, SOB, N/V, HA, dizziness, palpitations, fatigue. 20G IV placed to   RAC. Placed on cardiac monitor. NSR.

## 2022-06-07 NOTE — H&P ADULT - NSHPREVIEWOFSYSTEMS_GEN_ALL_CORE
REVIEW OF SYSTEMS:    CONSTITUTIONAL: No weakness, fevers or chills  EYES/ENT: No visual changes;  No dysphagia  NECK: No pain or stiffness  RESPIRATORY: No cough, wheezing, hemoptysis; No shortness of breath  CARDIOVASCULAR: +chest burning, no palpitations; No lower extremity edema  GASTROINTESTINAL: +epigastric burning pain radiating to chest. +nausea, no vomiting nor hematemesis; No diarrhea or constipation. No melena or hematochezia.  GENITOURINARY: No dysuria, frequency or hematuria  NEUROLOGICAL: No numbness or weakness  MSK: +chronic back pain from her surgery  SKIN: No itching, burning, rashes, or lesions

## 2022-06-07 NOTE — ED ADULT NURSE REASSESSMENT NOTE - NS ED NURSE REASSESS COMMENT FT1
Report given to CDU RN. Pt awake and alert, A&OX4. Pt Denies CP, SOB, HA, N/V, palpitations, fatigue, dizziness, blurry vision. Resp even and unlabored. NAD. Transferred at this time.

## 2022-06-08 LAB
A1C WITH ESTIMATED AVERAGE GLUCOSE RESULT: 5.1 % — SIGNIFICANT CHANGE UP (ref 4–5.6)
ALBUMIN SERPL ELPH-MCNC: 3.5 G/DL — SIGNIFICANT CHANGE UP (ref 3.3–5)
ALP SERPL-CCNC: 189 U/L — HIGH (ref 40–120)
ALT FLD-CCNC: 79 U/L — HIGH (ref 4–33)
ANION GAP SERPL CALC-SCNC: 12 MMOL/L — SIGNIFICANT CHANGE UP (ref 7–14)
APPEARANCE UR: CLEAR — SIGNIFICANT CHANGE UP
AST SERPL-CCNC: 47 U/L — HIGH (ref 4–32)
BASOPHILS # BLD AUTO: 0.02 K/UL — SIGNIFICANT CHANGE UP (ref 0–0.2)
BASOPHILS NFR BLD AUTO: 0.4 % — SIGNIFICANT CHANGE UP (ref 0–2)
BILIRUB DIRECT SERPL-MCNC: 0.2 MG/DL — SIGNIFICANT CHANGE UP (ref 0–0.3)
BILIRUB INDIRECT FLD-MCNC: 0.7 MG/DL — SIGNIFICANT CHANGE UP (ref 0–1)
BILIRUB SERPL-MCNC: 0.9 MG/DL — SIGNIFICANT CHANGE UP (ref 0.2–1.2)
BILIRUB UR-MCNC: NEGATIVE — SIGNIFICANT CHANGE UP
BUN SERPL-MCNC: 8 MG/DL — SIGNIFICANT CHANGE UP (ref 7–23)
CALCIUM SERPL-MCNC: 8.4 MG/DL — SIGNIFICANT CHANGE UP (ref 8.4–10.5)
CHLORIDE SERPL-SCNC: 107 MMOL/L — SIGNIFICANT CHANGE UP (ref 98–107)
CHOLEST SERPL-MCNC: 189 MG/DL — SIGNIFICANT CHANGE UP
CO2 SERPL-SCNC: 19 MMOL/L — LOW (ref 22–31)
COLOR SPEC: YELLOW — SIGNIFICANT CHANGE UP
CREAT SERPL-MCNC: 0.47 MG/DL — LOW (ref 0.5–1.3)
DIFF PNL FLD: NEGATIVE — SIGNIFICANT CHANGE UP
EGFR: 132 ML/MIN/1.73M2 — SIGNIFICANT CHANGE UP
EOSINOPHIL # BLD AUTO: 0.09 K/UL — SIGNIFICANT CHANGE UP (ref 0–0.5)
EOSINOPHIL NFR BLD AUTO: 1.7 % — SIGNIFICANT CHANGE UP (ref 0–6)
ESTIMATED AVERAGE GLUCOSE: 100 — SIGNIFICANT CHANGE UP
GLUCOSE SERPL-MCNC: 123 MG/DL — HIGH (ref 70–99)
GLUCOSE UR QL: NEGATIVE — SIGNIFICANT CHANGE UP
HCT VFR BLD CALC: 35.4 % — SIGNIFICANT CHANGE UP (ref 34.5–45)
HDLC SERPL-MCNC: 41 MG/DL — LOW
HGB BLD-MCNC: 11.5 G/DL — SIGNIFICANT CHANGE UP (ref 11.5–15.5)
IANC: 3.31 K/UL — SIGNIFICANT CHANGE UP (ref 1.8–7.4)
IMM GRANULOCYTES NFR BLD AUTO: 0.4 % — SIGNIFICANT CHANGE UP (ref 0–1.5)
INR BLD: 1.03 RATIO — SIGNIFICANT CHANGE UP (ref 0.88–1.16)
KETONES UR-MCNC: NEGATIVE — SIGNIFICANT CHANGE UP
LEUKOCYTE ESTERASE UR-ACNC: NEGATIVE — SIGNIFICANT CHANGE UP
LIPID PNL WITH DIRECT LDL SERPL: 125 MG/DL — HIGH
LYMPHOCYTES # BLD AUTO: 1.61 K/UL — SIGNIFICANT CHANGE UP (ref 1–3.3)
LYMPHOCYTES # BLD AUTO: 29.8 % — SIGNIFICANT CHANGE UP (ref 13–44)
MAGNESIUM SERPL-MCNC: 2.1 MG/DL — SIGNIFICANT CHANGE UP (ref 1.6–2.6)
MCHC RBC-ENTMCNC: 28.6 PG — SIGNIFICANT CHANGE UP (ref 27–34)
MCHC RBC-ENTMCNC: 32.5 GM/DL — SIGNIFICANT CHANGE UP (ref 32–36)
MCV RBC AUTO: 88.1 FL — SIGNIFICANT CHANGE UP (ref 80–100)
MONOCYTES # BLD AUTO: 0.35 K/UL — SIGNIFICANT CHANGE UP (ref 0–0.9)
MONOCYTES NFR BLD AUTO: 6.5 % — SIGNIFICANT CHANGE UP (ref 2–14)
NEUTROPHILS # BLD AUTO: 3.31 K/UL — SIGNIFICANT CHANGE UP (ref 1.8–7.4)
NEUTROPHILS NFR BLD AUTO: 61.2 % — SIGNIFICANT CHANGE UP (ref 43–77)
NITRITE UR-MCNC: NEGATIVE — SIGNIFICANT CHANGE UP
NON HDL CHOLESTEROL: 148 MG/DL — HIGH
NRBC # BLD: 0 /100 WBCS — SIGNIFICANT CHANGE UP
NRBC # FLD: 0 K/UL — SIGNIFICANT CHANGE UP
PH UR: 6 — SIGNIFICANT CHANGE UP (ref 5–8)
PLATELET # BLD AUTO: 184 K/UL — SIGNIFICANT CHANGE UP (ref 150–400)
POTASSIUM SERPL-MCNC: 3.3 MMOL/L — LOW (ref 3.5–5.3)
POTASSIUM SERPL-SCNC: 3.3 MMOL/L — LOW (ref 3.5–5.3)
PROT SERPL-MCNC: 6.5 G/DL — SIGNIFICANT CHANGE UP (ref 6–8.3)
PROT UR-MCNC: NEGATIVE — SIGNIFICANT CHANGE UP
PROTHROM AB SERPL-ACNC: 12 SEC — SIGNIFICANT CHANGE UP (ref 10.5–13.4)
RBC # BLD: 4.02 M/UL — SIGNIFICANT CHANGE UP (ref 3.8–5.2)
RBC # FLD: 12.5 % — SIGNIFICANT CHANGE UP (ref 10.3–14.5)
SODIUM SERPL-SCNC: 138 MMOL/L — SIGNIFICANT CHANGE UP (ref 135–145)
SP GR SPEC: 1.02 — SIGNIFICANT CHANGE UP (ref 1–1.05)
TRIGL SERPL-MCNC: 114 MG/DL — SIGNIFICANT CHANGE UP
TSH SERPL-MCNC: 2.32 UIU/ML — SIGNIFICANT CHANGE UP (ref 0.27–4.2)
UROBILINOGEN FLD QL: ABNORMAL
WBC # BLD: 5.4 K/UL — SIGNIFICANT CHANGE UP (ref 3.8–10.5)
WBC # FLD AUTO: 5.4 K/UL — SIGNIFICANT CHANGE UP (ref 3.8–10.5)

## 2022-06-08 PROCEDURE — 99233 SBSQ HOSP IP/OBS HIGH 50: CPT

## 2022-06-08 RX ORDER — SODIUM CHLORIDE 9 MG/ML
1000 INJECTION INTRAMUSCULAR; INTRAVENOUS; SUBCUTANEOUS
Refills: 0 | Status: DISCONTINUED | OUTPATIENT
Start: 2022-06-08 | End: 2022-06-09

## 2022-06-08 RX ORDER — POTASSIUM CHLORIDE 20 MEQ
20 PACKET (EA) ORAL
Refills: 0 | Status: COMPLETED | OUTPATIENT
Start: 2022-06-08 | End: 2022-06-08

## 2022-06-08 RX ORDER — ONDANSETRON 8 MG/1
4 TABLET, FILM COATED ORAL ONCE
Refills: 0 | Status: COMPLETED | OUTPATIENT
Start: 2022-06-08 | End: 2022-06-08

## 2022-06-08 RX ORDER — OXYCODONE HYDROCHLORIDE 5 MG/1
5 TABLET ORAL EVERY 8 HOURS
Refills: 0 | Status: DISCONTINUED | OUTPATIENT
Start: 2022-06-08 | End: 2022-06-09

## 2022-06-08 RX ADMIN — SODIUM CHLORIDE 100 MILLILITER(S): 9 INJECTION INTRAMUSCULAR; INTRAVENOUS; SUBCUTANEOUS at 03:02

## 2022-06-08 RX ADMIN — OXYCODONE HYDROCHLORIDE 5 MILLIGRAM(S): 5 TABLET ORAL at 09:32

## 2022-06-08 RX ADMIN — Medication 650 MILLIGRAM(S): at 17:05

## 2022-06-08 RX ADMIN — OXYCODONE HYDROCHLORIDE 5 MILLIGRAM(S): 5 TABLET ORAL at 23:32

## 2022-06-08 RX ADMIN — Medication 650 MILLIGRAM(S): at 16:35

## 2022-06-08 RX ADMIN — OXYCODONE HYDROCHLORIDE 5 MILLIGRAM(S): 5 TABLET ORAL at 22:32

## 2022-06-08 RX ADMIN — Medication 20 MILLIEQUIVALENT(S): at 09:32

## 2022-06-08 RX ADMIN — ONDANSETRON 4 MILLIGRAM(S): 8 TABLET, FILM COATED ORAL at 06:40

## 2022-06-08 RX ADMIN — ONDANSETRON 4 MILLIGRAM(S): 8 TABLET, FILM COATED ORAL at 11:32

## 2022-06-08 RX ADMIN — PANTOPRAZOLE SODIUM 40 MILLIGRAM(S): 20 TABLET, DELAYED RELEASE ORAL at 11:33

## 2022-06-08 RX ADMIN — Medication 20 MILLIEQUIVALENT(S): at 11:34

## 2022-06-08 RX ADMIN — OXYCODONE HYDROCHLORIDE 5 MILLIGRAM(S): 5 TABLET ORAL at 10:02

## 2022-06-08 RX ADMIN — SODIUM CHLORIDE 100 MILLILITER(S): 9 INJECTION INTRAMUSCULAR; INTRAVENOUS; SUBCUTANEOUS at 22:33

## 2022-06-08 RX ADMIN — Medication 20 MILLIEQUIVALENT(S): at 11:32

## 2022-06-08 NOTE — PROGRESS NOTE ADULT - SUBJECTIVE AND OBJECTIVE BOX
Patient is a 31y old  Female who presents with a chief complaint of chest and abd pain (2022 21:20)      SUBJECTIVE / OVERNIGHT EVENTS:    MEDICATIONS  (STANDING):  pantoprazole  Injectable 40 milliGRAM(s) IV Push daily  potassium chloride    Tablet ER 20 milliEquivalent(s) Oral every 2 hours  sodium chloride 0.9%. 1000 milliLiter(s) (100 mL/Hr) IV Continuous <Continuous>    MEDICATIONS  (PRN):  acetaminophen     Tablet .. 650 milliGRAM(s) Oral every 6 hours PRN Temp greater or equal to 38C (100.4F), Mild Pain (1 - 3)  aluminum hydroxide/magnesium hydroxide/simethicone Suspension 30 milliLiter(s) Oral every 4 hours PRN Dyspepsia  gabapentin 100 milliGRAM(s) Oral daily PRN back pain  oxyCODONE    IR 5 milliGRAM(s) Oral every 8 hours PRN Severe Pain (7 - 10)      Vital Signs Last 24 Hrs  T(C): 36.4 (2022 06:10), Max: 36.8 (2022 22:16)  T(F): 97.5 (2022 06:10), Max: 98.2 (2022 22:16)  HR: 83 (2022 06:10) (76 - 85)  BP: 99/68 (2022 06:10) (99/68 - 113/73)  BP(mean): --  RR: 18 (2022 06:10) (15 - 18)  SpO2: 99% (2022 06:10) (99% - 100%)  CAPILLARY BLOOD GLUCOSE        I&O's Summary      PHYSICAL EXAM:  GENERAL: NAD, well-developed  HEAD:  Atraumatic, Normocephalic  EYES: EOMI, PERRLA, conjunctiva and sclera clear  NECK: Supple, No JVD  CHEST/LUNG: Clear to auscultation bilaterally; No wheeze  HEART: Regular rate and rhythm; No murmurs, rubs, or gallops  ABDOMEN: Soft, Nontender, Nondistended; Bowel sounds present  EXTREMITIES:  2+ Peripheral Pulses, No clubbing, cyanosis, or edema  PSYCH: AAOx3  NEUROLOGY: non-focal  SKIN: No rashes or lesions    LABS:                        11.5   5.40  )-----------( 184      ( 2022 06:30 )             35.4     06-    138  |  107  |  8   ----------------------------<  123<H>  3.3<L>   |  19<L>  |  0.47<L>    Ca    8.4      2022 06:30  Mg     2.10     -    TPro  6.5  /  Alb  3.5  /  TBili  0.9  /  DBili  0.2  /  AST  47<H>  /  ALT  79<H>  /  AlkPhos  189<H>      PT/INR - ( 2022 06:30 )   PT: 12.0 sec;   INR: 1.03 ratio               Urinalysis Basic - ( 2022 08:02 )    Color: Yellow / Appearance: Clear / S.017 / pH: x  Gluc: x / Ketone: Negative  / Bili: Negative / Urobili: 3 mg/dL   Blood: x / Protein: Negative / Nitrite: Negative   Leuk Esterase: Negative / RBC: x / WBC x   Sq Epi: x / Non Sq Epi: x / Bacteria: x        RADIOLOGY & ADDITIONAL TESTS:    Imaging Personally Reviewed:    Consultant(s) Notes Reviewed:      Care Discussed with Consultants/Other Providers:   Patient is a 31y old  Female who presents with a chief complaint of chest and abd pain (2022 21:20)      SUBJECTIVE / OVERNIGHT EVENTS: patient seen and examined by bedside , pt c/o mild RUQ  abdominal pain , nausea, also has vomitingx 2 times , denies fever, chills  headache, dizziness, SOB, CP, Palpitations ,     MEDICATIONS  (STANDING):  pantoprazole  Injectable 40 milliGRAM(s) IV Push daily  potassium chloride    Tablet ER 20 milliEquivalent(s) Oral every 2 hours  sodium chloride 0.9%. 1000 milliLiter(s) (100 mL/Hr) IV Continuous <Continuous>    MEDICATIONS  (PRN):  acetaminophen     Tablet .. 650 milliGRAM(s) Oral every 6 hours PRN Temp greater or equal to 38C (100.4F), Mild Pain (1 - 3)  aluminum hydroxide/magnesium hydroxide/simethicone Suspension 30 milliLiter(s) Oral every 4 hours PRN Dyspepsia  gabapentin 100 milliGRAM(s) Oral daily PRN back pain  oxyCODONE    IR 5 milliGRAM(s) Oral every 8 hours PRN Severe Pain (7 - 10)      Vital Signs Last 24 Hrs  T(C): 36.4 (2022 06:10), Max: 36.8 (2022 22:16)  T(F): 97.5 (2022 06:10), Max: 98.2 (2022 22:16)  HR: 83 (2022 06:10) (76 - 85)  BP: 99/68 (2022 06:10) (99/68 - 113/73)  BP(mean): --  RR: 18 (2022 06:10) (15 - 18)  SpO2: 99% (2022 06:10) (99% - 100%)  CAPILLARY BLOOD GLUCOSE        I&O's Summary      PHYSICAL EXAM:  GENERAL: NAD, well-developed  HEAD:  Atraumatic, Normocephalic  EYES: EOMI, PERRLA, conjunctiva and sclera clear  NECK: Supple, No JVD  CHEST/LUNG: Clear to auscultation bilaterally; No wheeze  HEART: Regular rate and rhythm; No murmurs, rubs, or gallops  ABDOMEN: Soft, mild-mod ttp in epigastric and RUQ  area, ,Nondistended; Bowel sounds present  EXTREMITIES:  2+ Peripheral Pulses, No clubbing, cyanosis, or edema  PSYCH: AAOx3  NEUROLOGY: non-focal  SKIN: No rashes or lesions    LABS:                        11.5   5.40  )-----------( 184      ( 2022 06:30 )             35.4         138  |  107  |  8   ----------------------------<  123<H>  3.3<L>   |  19<L>  |  0.47<L>    Ca    8.4      2022 06:30  Mg     2.10     -    TPro  6.5  /  Alb  3.5  /  TBili  0.9  /  DBili  0.2  /  AST  47<H>  /  ALT  79<H>  /  AlkPhos  189<H>      PT/INR - ( 2022 06:30 )   PT: 12.0 sec;   INR: 1.03 ratio               Urinalysis Basic - ( 2022 08:02 )    Color: Yellow / Appearance: Clear / S.017 / pH: x  Gluc: x / Ketone: Negative  / Bili: Negative / Urobili: 3 mg/dL   Blood: x / Protein: Negative / Nitrite: Negative   Leuk Esterase: Negative / RBC: x / WBC x   Sq Epi: x / Non Sq Epi: x / Bacteria: x        RADIOLOGY & ADDITIONAL TESTS:    Imaging Personally Reviewed:    Consultant(s) Notes Reviewed:      Care Discussed with Consultants/Other Providers:

## 2022-06-08 NOTE — PROGRESS NOTE ADULT - PROBLEM SELECTOR PLAN 2
burning pain radiating from epigastric area and also pleuritic. Trop < 6, EKG NSR. CTA nondiagnostic study given respiratory motion artifact, however Ddimer negative.   -likely related to above, possible PUD. continue PPI for now burning pain radiating from epigastric area and also pleuritic. Trop < 6, EKG NSR. CTA nondiagnostic study given respiratory motion artifact, however D-dimer negative.   -likely related to above, possible PUD. continue PPI for now

## 2022-06-08 NOTE — PATIENT PROFILE ADULT - FALL HARM RISK - UNIVERSAL INTERVENTIONS
Bed in lowest position, wheels locked, appropriate side rails in place/Call bell, personal items and telephone in reach/Instruct patient to call for assistance before getting out of bed or chair/Non-slip footwear when patient is out of bed/[place] to call system/Physically safe environment - no spills, clutter or unnecessary equipment/Purposeful Proactive Rounding/Room/bathroom lighting operational, light cord in reach

## 2022-06-08 NOTE — PROGRESS NOTE ADULT - PROBLEM SELECTOR PLAN 1
Described as a burning epigastric pain radiating to chest. Differential includes PUD/gastritis vs biliary colic given mild dilatation of CBD and elevated LFTs (bili higher than previously)  -s/p pepcid, continue IV PPI for now in AM  -will obtain MRCP to further evaluate biliary tree  -lipase wnl. no diarrhea reported. check urinarlysis Described as a burning epigastric pain radiating to chest. Differential includes PUD/gastritis vs biliary colic given mild dilatation of CBD and elevated LFTs (bili higher than previously)  -s/p pepcid, continue IV PPI for now   -will obtain MRCP to further evaluate biliary tree  -lipase wnl. no diarrhea reported. check urinarlysis

## 2022-06-09 LAB
ALBUMIN SERPL ELPH-MCNC: 3.7 G/DL — SIGNIFICANT CHANGE UP (ref 3.3–5)
ALP SERPL-CCNC: 169 U/L — HIGH (ref 40–120)
ALT FLD-CCNC: 67 U/L — HIGH (ref 4–33)
ANION GAP SERPL CALC-SCNC: 10 MMOL/L — SIGNIFICANT CHANGE UP (ref 7–14)
AST SERPL-CCNC: 26 U/L — SIGNIFICANT CHANGE UP (ref 4–32)
BASOPHILS # BLD AUTO: 0.01 K/UL — SIGNIFICANT CHANGE UP (ref 0–0.2)
BASOPHILS NFR BLD AUTO: 0.2 % — SIGNIFICANT CHANGE UP (ref 0–2)
BILIRUB SERPL-MCNC: 0.6 MG/DL — SIGNIFICANT CHANGE UP (ref 0.2–1.2)
BUN SERPL-MCNC: 7 MG/DL — SIGNIFICANT CHANGE UP (ref 7–23)
CALCIUM SERPL-MCNC: 9.1 MG/DL — SIGNIFICANT CHANGE UP (ref 8.4–10.5)
CHLORIDE SERPL-SCNC: 107 MMOL/L — SIGNIFICANT CHANGE UP (ref 98–107)
CO2 SERPL-SCNC: 22 MMOL/L — SIGNIFICANT CHANGE UP (ref 22–31)
CREAT SERPL-MCNC: 0.53 MG/DL — SIGNIFICANT CHANGE UP (ref 0.5–1.3)
EGFR: 128 ML/MIN/1.73M2 — SIGNIFICANT CHANGE UP
EOSINOPHIL # BLD AUTO: 0.14 K/UL — SIGNIFICANT CHANGE UP (ref 0–0.5)
EOSINOPHIL NFR BLD AUTO: 2.6 % — SIGNIFICANT CHANGE UP (ref 0–6)
GLUCOSE SERPL-MCNC: 85 MG/DL — SIGNIFICANT CHANGE UP (ref 70–99)
HCT VFR BLD CALC: 35.6 % — SIGNIFICANT CHANGE UP (ref 34.5–45)
HGB BLD-MCNC: 11.5 G/DL — SIGNIFICANT CHANGE UP (ref 11.5–15.5)
IANC: 2.45 K/UL — SIGNIFICANT CHANGE UP (ref 1.8–7.4)
IMM GRANULOCYTES NFR BLD AUTO: 0.2 % — SIGNIFICANT CHANGE UP (ref 0–1.5)
LYMPHOCYTES # BLD AUTO: 2.39 K/UL — SIGNIFICANT CHANGE UP (ref 1–3.3)
LYMPHOCYTES # BLD AUTO: 43.5 % — SIGNIFICANT CHANGE UP (ref 13–44)
MAGNESIUM SERPL-MCNC: 1.9 MG/DL — SIGNIFICANT CHANGE UP (ref 1.6–2.6)
MCHC RBC-ENTMCNC: 28.7 PG — SIGNIFICANT CHANGE UP (ref 27–34)
MCHC RBC-ENTMCNC: 32.3 GM/DL — SIGNIFICANT CHANGE UP (ref 32–36)
MCV RBC AUTO: 88.8 FL — SIGNIFICANT CHANGE UP (ref 80–100)
MONOCYTES # BLD AUTO: 0.49 K/UL — SIGNIFICANT CHANGE UP (ref 0–0.9)
MONOCYTES NFR BLD AUTO: 8.9 % — SIGNIFICANT CHANGE UP (ref 2–14)
NEUTROPHILS # BLD AUTO: 2.45 K/UL — SIGNIFICANT CHANGE UP (ref 1.8–7.4)
NEUTROPHILS NFR BLD AUTO: 44.6 % — SIGNIFICANT CHANGE UP (ref 43–77)
NRBC # BLD: 0 /100 WBCS — SIGNIFICANT CHANGE UP
NRBC # FLD: 0 K/UL — SIGNIFICANT CHANGE UP
PHOSPHATE SERPL-MCNC: 2.9 MG/DL — SIGNIFICANT CHANGE UP (ref 2.5–4.5)
PLATELET # BLD AUTO: 203 K/UL — SIGNIFICANT CHANGE UP (ref 150–400)
POTASSIUM SERPL-MCNC: 3.7 MMOL/L — SIGNIFICANT CHANGE UP (ref 3.5–5.3)
POTASSIUM SERPL-SCNC: 3.7 MMOL/L — SIGNIFICANT CHANGE UP (ref 3.5–5.3)
PROT SERPL-MCNC: 6.5 G/DL — SIGNIFICANT CHANGE UP (ref 6–8.3)
RBC # BLD: 4.01 M/UL — SIGNIFICANT CHANGE UP (ref 3.8–5.2)
RBC # FLD: 12.6 % — SIGNIFICANT CHANGE UP (ref 10.3–14.5)
SODIUM SERPL-SCNC: 139 MMOL/L — SIGNIFICANT CHANGE UP (ref 135–145)
WBC # BLD: 5.49 K/UL — SIGNIFICANT CHANGE UP (ref 3.8–10.5)
WBC # FLD AUTO: 5.49 K/UL — SIGNIFICANT CHANGE UP (ref 3.8–10.5)

## 2022-06-09 PROCEDURE — 99232 SBSQ HOSP IP/OBS MODERATE 35: CPT

## 2022-06-09 PROCEDURE — 74183 MRI ABD W/O CNTR FLWD CNTR: CPT | Mod: 26

## 2022-06-09 RX ORDER — OXYCODONE HYDROCHLORIDE 5 MG/1
5 TABLET ORAL EVERY 8 HOURS
Refills: 0 | Status: DISCONTINUED | OUTPATIENT
Start: 2022-06-09 | End: 2022-06-10

## 2022-06-09 RX ORDER — GABAPENTIN 400 MG/1
100 CAPSULE ORAL DAILY
Refills: 0 | Status: DISCONTINUED | OUTPATIENT
Start: 2022-06-09 | End: 2022-06-10

## 2022-06-09 RX ORDER — SODIUM CHLORIDE 9 MG/ML
1000 INJECTION, SOLUTION INTRAVENOUS
Refills: 0 | Status: DISCONTINUED | OUTPATIENT
Start: 2022-06-09 | End: 2022-06-10

## 2022-06-09 RX ADMIN — Medication 650 MILLIGRAM(S): at 13:28

## 2022-06-09 RX ADMIN — Medication 650 MILLIGRAM(S): at 12:58

## 2022-06-09 RX ADMIN — PANTOPRAZOLE SODIUM 40 MILLIGRAM(S): 20 TABLET, DELAYED RELEASE ORAL at 12:56

## 2022-06-09 RX ADMIN — SODIUM CHLORIDE 100 MILLILITER(S): 9 INJECTION, SOLUTION INTRAVENOUS at 08:58

## 2022-06-09 NOTE — PROGRESS NOTE ADULT - PROBLEM SELECTOR PLAN 2
burning pain radiating from epigastric area and also pleuritic. Trop < 6, EKG NSR. CTA nondiagnostic study given respiratory motion artifact, however D-dimer negative.   -likely related to above, possible PUD. continue PPI for now

## 2022-06-09 NOTE — PROGRESS NOTE ADULT - PROBLEM SELECTOR PLAN 1
Described as a burning epigastric pain radiating to chest. Differential includes PUD/gastritis vs biliary colic given mild dilatation of CBD and elevated LFTs (bili higher than previously)  -s/p pepcid, continue IV PPI for now   -will obtain MRCP to further evaluate biliary tree  -lipase wnl. no diarrhea reported. check urinarlysis Described as a burning epigastric pain radiating to chest. Differential includes PUD/gastritis vs biliary colic given mild dilatation of CBD and elevated LFTs (bili higher than previously)  -s/p pepcid, continue IV PPI for now   -will obtain MRCP to further evaluate biliary tree  -lipase wnl. no diarrhea reported. urinarlysis with elevated urobilinogen

## 2022-06-09 NOTE — PROGRESS NOTE ADULT - SUBJECTIVE AND OBJECTIVE BOX
Patient is a 31y old  Female who presents with a chief complaint of chest and abd pain (2022 09:51)      SUBJECTIVE / OVERNIGHT EVENTS:    MEDICATIONS  (STANDING):  dextrose 5% + sodium chloride 0.45%. 1000 milliLiter(s) (100 mL/Hr) IV Continuous <Continuous>  pantoprazole  Injectable 40 milliGRAM(s) IV Push daily    MEDICATIONS  (PRN):  acetaminophen     Tablet .. 650 milliGRAM(s) Oral every 6 hours PRN Temp greater or equal to 38C (100.4F), Mild Pain (1 - 3)  aluminum hydroxide/magnesium hydroxide/simethicone Suspension 30 milliLiter(s) Oral every 4 hours PRN Dyspepsia  gabapentin 100 milliGRAM(s) Oral daily PRN back pain  oxyCODONE    IR 5 milliGRAM(s) Oral every 8 hours PRN Severe Pain (7 - 10)      Vital Signs Last 24 Hrs  T(C): 36.3 (2022 06:17), Max: 36.8 (2022 19:54)  T(F): 97.4 (2022 06:17), Max: 98.3 (2022 19:54)  HR: 65 (2022 06:17) (65 - 82)  BP: 108/64 (2022 06:17) (98/60 - 108/64)  BP(mean): --  RR: 18 (2022 06:17) (17 - 18)  SpO2: 100% (2022 06:17) (97% - 100%)  CAPILLARY BLOOD GLUCOSE        I&O's Summary      PHYSICAL EXAM:  GENERAL: NAD, well-developed  HEAD:  Atraumatic, Normocephalic  EYES: EOMI, PERRLA, conjunctiva and sclera clear  NECK: Supple, No JVD  CHEST/LUNG: Clear to auscultation bilaterally; No wheeze  HEART: Regular rate and rhythm; No murmurs, rubs, or gallops  ABDOMEN: Soft, Nontender, Nondistended; Bowel sounds present  EXTREMITIES:  2+ Peripheral Pulses, No clubbing, cyanosis, or edema  PSYCH: AAOx3  NEUROLOGY: non-focal  SKIN: No rashes or lesions    LABS:                        11.5   5.49  )-----------( 203      ( 2022 05:46 )             35.6     06-09    139  |  107  |  7   ----------------------------<  85  3.7   |  22  |  0.53    Ca    9.1      2022 05:46  Phos  2.9       Mg     1.90         TPro  6.5  /  Alb  3.7  /  TBili  0.6  /  DBili  x   /  AST  26  /  ALT  67<H>  /  AlkPhos  169<H>      PT/INR - ( 2022 06:30 )   PT: 12.0 sec;   INR: 1.03 ratio               Urinalysis Basic - ( 2022 08:02 )    Color: Yellow / Appearance: Clear / S.017 / pH: x  Gluc: x / Ketone: Negative  / Bili: Negative / Urobili: 3 mg/dL   Blood: x / Protein: Negative / Nitrite: Negative   Leuk Esterase: Negative / RBC: x / WBC x   Sq Epi: x / Non Sq Epi: x / Bacteria: x        RADIOLOGY & ADDITIONAL TESTS:    Imaging Personally Reviewed:    Consultant(s) Notes Reviewed:      Care Discussed with Consultants/Other Providers:   Patient is a 31y old  Female who presents with a chief complaint of chest and abd pain (2022 09:51)      SUBJECTIVE / OVERNIGHT EVENTS: patient seen and examined by bedside, pt feeling slightly better, still has RUQ pain but slightly improved nuasea and vomiting resolved     MEDICATIONS  (STANDING):  dextrose 5% + sodium chloride 0.45%. 1000 milliLiter(s) (100 mL/Hr) IV Continuous <Continuous>  pantoprazole  Injectable 40 milliGRAM(s) IV Push daily    MEDICATIONS  (PRN):  acetaminophen     Tablet .. 650 milliGRAM(s) Oral every 6 hours PRN Temp greater or equal to 38C (100.4F), Mild Pain (1 - 3)  aluminum hydroxide/magnesium hydroxide/simethicone Suspension 30 milliLiter(s) Oral every 4 hours PRN Dyspepsia  gabapentin 100 milliGRAM(s) Oral daily PRN back pain  oxyCODONE    IR 5 milliGRAM(s) Oral every 8 hours PRN Severe Pain (7 - 10)      Vital Signs Last 24 Hrs  T(C): 36.3 (2022 06:17), Max: 36.8 (2022 19:54)  T(F): 97.4 (2022 06:17), Max: 98.3 (2022 19:54)  HR: 65 (2022 06:17) (65 - 82)  BP: 108/64 (2022 06:17) (98/60 - 108/64)  BP(mean): --  RR: 18 (2022 06:17) (17 - 18)  SpO2: 100% (2022 06:17) (97% - 100%)      PHYSICAL EXAM:  GENERAL: NAD, well-developed  HEAD:  Atraumatic, Normocephalic  EYES: EOMI, PERRLA, conjunctiva and sclera clear  NECK: Supple, No JVD  CHEST/LUNG: Clear to auscultation bilaterally; No wheeze  HEART: Regular rate and rhythm; No murmurs, rubs, or gallops  ABDOMEN: Soft, mild-mod ttp in epigastric and RUQ  area, ,Nondistended; Bowel sounds present  EXTREMITIES:  2+ Peripheral Pulses, No clubbing, cyanosis, or edema  PSYCH: AAOx3  NEUROLOGY: non-focal  SKIN: No rashes or lesions    LABS:                        11.5   5.49  )-----------( 203      ( 2022 05:46 )             35.6         139  |  107  |  7   ----------------------------<  85  3.7   |  22  |  0.53    Ca    9.1      2022 05:46  Phos  2.9       Mg     1.90         TPro  6.5  /  Alb  3.7  /  TBili  0.6  /  DBili  x   /  AST  26  /  ALT  67<H>  /  AlkPhos  169<H>      PT/INR - ( 2022 06:30 )   PT: 12.0 sec;   INR: 1.03 ratio               Urinalysis Basic - ( 2022 08:02 )    Color: Yellow / Appearance: Clear / S.017 / pH: x  Gluc: x / Ketone: Negative  / Bili: Negative / Urobili: 3 mg/dL   Blood: x / Protein: Negative / Nitrite: Negative   Leuk Esterase: Negative / RBC: x / WBC x   Sq Epi: x / Non Sq Epi: x / Bacteria: x        RADIOLOGY & ADDITIONAL TESTS:    Imaging Personally Reviewed:    Consultant(s) Notes Reviewed:      Care Discussed with Consultants/Other Providers:

## 2022-06-10 ENCOUNTER — TRANSCRIPTION ENCOUNTER (OUTPATIENT)
Age: 32
End: 2022-06-10

## 2022-06-10 VITALS
OXYGEN SATURATION: 100 % | RESPIRATION RATE: 17 BRPM | TEMPERATURE: 98 F | DIASTOLIC BLOOD PRESSURE: 75 MMHG | SYSTOLIC BLOOD PRESSURE: 113 MMHG | HEART RATE: 70 BPM

## 2022-06-10 LAB
ALBUMIN SERPL ELPH-MCNC: 3.8 G/DL — SIGNIFICANT CHANGE UP (ref 3.3–5)
ALP SERPL-CCNC: 151 U/L — HIGH (ref 40–120)
ALT FLD-CCNC: 60 U/L — HIGH (ref 4–33)
ANION GAP SERPL CALC-SCNC: 8 MMOL/L — SIGNIFICANT CHANGE UP (ref 7–14)
AST SERPL-CCNC: 23 U/L — SIGNIFICANT CHANGE UP (ref 4–32)
BILIRUB SERPL-MCNC: 0.5 MG/DL — SIGNIFICANT CHANGE UP (ref 0.2–1.2)
BUN SERPL-MCNC: 7 MG/DL — SIGNIFICANT CHANGE UP (ref 7–23)
CALCIUM SERPL-MCNC: 9.3 MG/DL — SIGNIFICANT CHANGE UP (ref 8.4–10.5)
CHLORIDE SERPL-SCNC: 101 MMOL/L — SIGNIFICANT CHANGE UP (ref 98–107)
CO2 SERPL-SCNC: 26 MMOL/L — SIGNIFICANT CHANGE UP (ref 22–31)
CREAT SERPL-MCNC: 0.55 MG/DL — SIGNIFICANT CHANGE UP (ref 0.5–1.3)
EGFR: 127 ML/MIN/1.73M2 — SIGNIFICANT CHANGE UP
GLUCOSE SERPL-MCNC: 94 MG/DL — SIGNIFICANT CHANGE UP (ref 70–99)
HCT VFR BLD CALC: 35.7 % — SIGNIFICANT CHANGE UP (ref 34.5–45)
HGB BLD-MCNC: 11.7 G/DL — SIGNIFICANT CHANGE UP (ref 11.5–15.5)
MAGNESIUM SERPL-MCNC: 1.8 MG/DL — SIGNIFICANT CHANGE UP (ref 1.6–2.6)
MCHC RBC-ENTMCNC: 28.7 PG — SIGNIFICANT CHANGE UP (ref 27–34)
MCHC RBC-ENTMCNC: 32.8 GM/DL — SIGNIFICANT CHANGE UP (ref 32–36)
MCV RBC AUTO: 87.5 FL — SIGNIFICANT CHANGE UP (ref 80–100)
NRBC # BLD: 0 /100 WBCS — SIGNIFICANT CHANGE UP
NRBC # FLD: 0 K/UL — SIGNIFICANT CHANGE UP
PHOSPHATE SERPL-MCNC: 3.5 MG/DL — SIGNIFICANT CHANGE UP (ref 2.5–4.5)
PLATELET # BLD AUTO: 218 K/UL — SIGNIFICANT CHANGE UP (ref 150–400)
POTASSIUM SERPL-MCNC: 3.5 MMOL/L — SIGNIFICANT CHANGE UP (ref 3.5–5.3)
POTASSIUM SERPL-SCNC: 3.5 MMOL/L — SIGNIFICANT CHANGE UP (ref 3.5–5.3)
PROT SERPL-MCNC: 6.6 G/DL — SIGNIFICANT CHANGE UP (ref 6–8.3)
RBC # BLD: 4.08 M/UL — SIGNIFICANT CHANGE UP (ref 3.8–5.2)
RBC # FLD: 12.6 % — SIGNIFICANT CHANGE UP (ref 10.3–14.5)
SODIUM SERPL-SCNC: 135 MMOL/L — SIGNIFICANT CHANGE UP (ref 135–145)
WBC # BLD: 6.59 K/UL — SIGNIFICANT CHANGE UP (ref 3.8–10.5)
WBC # FLD AUTO: 6.59 K/UL — SIGNIFICANT CHANGE UP (ref 3.8–10.5)

## 2022-06-10 PROCEDURE — 99239 HOSP IP/OBS DSCHRG MGMT >30: CPT

## 2022-06-10 RX ORDER — PANTOPRAZOLE SODIUM 20 MG/1
40 TABLET, DELAYED RELEASE ORAL DAILY
Refills: 0 | Status: DISCONTINUED | OUTPATIENT
Start: 2022-06-10 | End: 2022-06-10

## 2022-06-10 RX ORDER — POLYETHYLENE GLYCOL 3350 17 G/17G
17 POWDER, FOR SOLUTION ORAL
Qty: 0 | Refills: 0 | DISCHARGE
Start: 2022-06-10

## 2022-06-10 RX ORDER — POLYETHYLENE GLYCOL 3350 17 G/17G
17 POWDER, FOR SOLUTION ORAL DAILY
Refills: 0 | Status: DISCONTINUED | OUTPATIENT
Start: 2022-06-10 | End: 2022-06-10

## 2022-06-10 RX ORDER — PANTOPRAZOLE SODIUM 20 MG/1
1 TABLET, DELAYED RELEASE ORAL
Qty: 30 | Refills: 0
Start: 2022-06-10 | End: 2022-07-09

## 2022-06-10 RX ADMIN — Medication 650 MILLIGRAM(S): at 11:48

## 2022-06-10 RX ADMIN — SODIUM CHLORIDE 100 MILLILITER(S): 9 INJECTION, SOLUTION INTRAVENOUS at 01:36

## 2022-06-10 RX ADMIN — Medication 650 MILLIGRAM(S): at 13:00

## 2022-06-10 RX ADMIN — PANTOPRAZOLE SODIUM 40 MILLIGRAM(S): 20 TABLET, DELAYED RELEASE ORAL at 11:45

## 2022-06-10 NOTE — PROGRESS NOTE ADULT - PROBLEM SELECTOR PLAN 1
Described as a burning epigastric pain radiating to chest. Differential includes PUD/gastritis vs biliary colic given mild dilatation of CBD and elevated LFTs (bili higher than previously)  -s/p pepcid, continue IV PPI for now   -will obtain MRCP to further evaluate biliary tree  -lipase wnl. no diarrhea reported. urinarlysis with elevated urobilinogen Described as a burning epigastric pain radiating to chest. Differential includes PUD/gastritis vs biliary colic given mild dilatation of CBD and elevated LFTs (bili higher than previously)  -s/p pepcid, continue IV PPI for now , will dc home on Po PPI   - MRCP noted as above , Normal biliary tree without choledocholithiasis.  -lipase wnl. no diarrhea reported. urinarlysis with elevated urobilinogen  -outpt f/u with PMD

## 2022-06-10 NOTE — PROGRESS NOTE ADULT - PROBLEM SELECTOR PLAN 4
dash diet, SCDs for dvt ppx  d/w pt and ACP dash diet, SCDs for dvt ppx    MRCP unremarkable, pain improved, nasuea and vomiting resolved,   -'will dc home, outpt f/u with PMD  Patient hemodynamically stable for discharge home  Time spent in discharge process is 40 min  d/w pt and ACP    '

## 2022-06-10 NOTE — DISCHARGE NOTE PROVIDER - CARE PROVIDERS DIRECT ADDRESSES
[email or web address] Glycopyrrolate Pregnancy And Lactation Text: This medication is Pregnancy Category B and is considered safe during pregnancy. It is unknown if it is excreted breast milk.

## 2022-06-10 NOTE — PROGRESS NOTE ADULT - ASSESSMENT
32 y/o female with pmhx of L4-L5 rebeca laminectomy, discectomy (4/2022), prior cholecystectomy 2020 presents to the ER with 1 day history of chest and abdominal pain described as burning starting from epigastrum and radiating up. Admitted for further evaluation. 
32 y/o female with pmhx of L4-L5 rebeca laminectomy, discectomy (4/2022), prior cholecystectomy 2020 presents to the ER with 1 day history of chest and abdominal pain described as burning starting from epigastrum and radiating up. Admitted for further evaluation. 
30 y/o female with pmhx of L4-L5 rebeca laminectomy, discectomy (4/2022), prior cholecystectomy 2020 presents to the ER with 1 day history of chest and abdominal pain described as burning starting from epigastrum and radiating up. Admitted for further evaluation.

## 2022-06-10 NOTE — DISCHARGE NOTE NURSING/CASE MANAGEMENT/SOCIAL WORK - PATIENT PORTAL LINK FT
You can access the FollowMyHealth Patient Portal offered by [facility] by registering at the following website: [email or web address]. By joining [facility]’s FollowMyHealth portal, you will also be able to view your health information using other applications (apps) compatible with our system.

## 2022-06-10 NOTE — DISCHARGE NOTE PROVIDER - CARE PROVIDER_API CALL
Alejandro Chaudhary)  Internal Medicine  [address] [place] [address], [facility] [address]  [place] [address]  Phone: [phone]  Fax: [phone]  Follow Up Time:

## 2022-06-10 NOTE — DISCHARGE NOTE PROVIDER - NSDCMRMEDTOKEN_GEN_ALL_CORE_FT
aluminum hydroxide-magnesium hydroxide 200 mg-200 mg/5 mL oral suspension: 30 milliliter(s) orally every 4 hours, As needed, Dyspepsia  gabapentin 100 mg oral tablet:   pantoprazole 40 mg oral delayed release tablet: 1 tab(s) orally once a day  polyethylene glycol 3350 oral powder for reconstitution: 17 gram(s) orally once a day, As needed, Constipation

## 2022-06-10 NOTE — DISCHARGE NOTE PROVIDER - NSRESEARCHGRANT_OVERRIDEREC_GEN_A_CORE
Peritoneal dialysis run complete. Patient aseptically removed from peritoneal dialysis following completion. IMPROVE-DD Application Not Available

## 2022-06-10 NOTE — DISCHARGE NOTE NURSING/CASE MANAGEMENT/SOCIAL WORK - NSDCPEFALRISK_GEN_ALL_CORE
For information on Fall & Injury Prevention, visit: [email or web address] OR  [email or web address] OR  https://www.cdc.gov/steadi/patient.html

## 2022-06-10 NOTE — PROGRESS NOTE ADULT - SUBJECTIVE AND OBJECTIVE BOX
Patient is a 31y old  Female who presents with a chief complaint of chest and abd pain (09 Jun 2022 09:29)      SUBJECTIVE / OVERNIGHT EVENTS:    MEDICATIONS  (STANDING):  pantoprazole    Tablet 40 milliGRAM(s) Oral daily    MEDICATIONS  (PRN):  acetaminophen     Tablet .. 650 milliGRAM(s) Oral every 6 hours PRN Temp greater or equal to 38C (100.4F), Mild Pain (1 - 3)  aluminum hydroxide/magnesium hydroxide/simethicone Suspension 30 milliLiter(s) Oral every 4 hours PRN Dyspepsia  gabapentin 100 milliGRAM(s) Oral daily PRN back pain      Vital Signs Last 24 Hrs  T(C): 36.5 (10 [place] 2022 05:45), Max: 36.6 (09 Jun 2022 12:43)  T(F): 97.7 (10 [place] 2022 05:45), Max: 97.9 (09 Jun 2022 12:43)  HR: 66 (10 [place] 2022 05:45) (63 - 66)  BP: 98/72 (10 [place] 2022 05:45) (96/63 - 98/72)  BP(mean): --  RR: 18 (10 [place] 2022 05:45) (18 - 18)  SpO2: 100% (10 [place] 2022 05:45) (100% - 100%)  CAPILLARY BLOOD GLUCOSE        I&O's Summary      PHYSICAL EXAM:  GENERAL: NAD, well-developed  HEAD:  Atraumatic, Normocephalic  EYES: EOMI, PERRLA, conjunctiva and sclera clear  NECK: Supple, No JVD  CHEST/LUNG: Clear to auscultation bilaterally; No wheeze  HEART: Regular rate and rhythm; No murmurs, rubs, or gallops  ABDOMEN: Soft, Nontender, Nondistended; Bowel sounds present  EXTREMITIES:  2+ Peripheral Pulses, No clubbing, cyanosis, or edema  PSYCH: AAOx3  NEUROLOGY: non-focal  SKIN: No rashes or lesions    LABS:                        11.7   6.59  )-----------( 218      ( 10 [place] 2022 05:41 )             35.7     06-10    135  |  101  |  7   ----------------------------<  94  3.5   |  26  |  0.55    Ca    9.3      10 [place] 2022 05:41  Phos  3.5     06-10  Mg     1.80     06-10    TPro  6.6  /  Alb  3.8  /  TBili  0.5  /  DBili  x   /  AST  23  /  ALT  60<H>  /  AlkPhos  151<H>  06-10              RADIOLOGY & ADDITIONAL TESTS:    Imaging Personally Reviewed:    Consultant(s) Notes Reviewed:      Care Discussed with Consultants/Other Providers:   Patient is a 31y old  Female who presents with a chief complaint of chest and abd pain (09 Jun 2022 09:29)      SUBJECTIVE / OVERNIGHT EVENTS: patient seen and examined by bedside, pt feeling better than before, still reports mild RUQ discomfort, nausea and  vomiting resolved, moving bowels, tolerating diet,  afebrile,     MEDICATIONS  (STANDING):  pantoprazole    Tablet 40 milliGRAM(s) Oral daily    MEDICATIONS  (PRN):  acetaminophen     Tablet .. 650 milliGRAM(s) Oral every 6 hours PRN Temp greater or equal to 38C (100.4F), Mild Pain (1 - 3)  aluminum hydroxide/magnesium hydroxide/simethicone Suspension 30 milliLiter(s) Oral every 4 hours PRN Dyspepsia  gabapentin 100 milliGRAM(s) Oral daily PRN back pain      Vital Signs Last 24 Hrs  T(C): 36.5 (10 [place] 2022 05:45), Max: 36.6 (09 Jun 2022 12:43)  T(F): 97.7 (10 [place] 2022 05:45), Max: 97.9 (09 Jun 2022 12:43)  HR: 66 (10 [place] 2022 05:45) (63 - 66)  BP: 98/72 (10 [place] 2022 05:45) (96/63 - 98/72)  BP(mean): --  RR: 18 (10 [place] 2022 05:45) (18 - 18)  SpO2: 100% (10 [place] 2022 05:45) (100% - 100%)  CAPILLARY BLOOD GLUCOSE        I&O's Summary      PHYSICAL EXAM:  GENERAL: NAD, well-developed  HEAD:  Atraumatic, Normocephalic  EYES: EOMI, PERRLA, conjunctiva and sclera clear  NECK: Supple, No JVD  CHEST/LUNG: Clear to auscultation bilaterally; No wheeze  HEART: Regular rate and rhythm; No murmurs, rubs, or gallops  ABDOMEN: Soft, Nontender, Nondistended; Bowel sounds present  EXTREMITIES:  2+ Peripheral Pulses, No clubbing, cyanosis, or edema  PSYCH: AAOx3  NEUROLOGY: non-focal  SKIN: No rashes or lesions    LABS:                        11.7   6.59  )-----------( 218      ( 10 [place] 2022 05:41 )             35.7     06-10    135  |  101  |  7   ----------------------------<  94  3.5   |  26  |  0.55    Ca    9.3      10 [place] 2022 05:41  Phos  3.5     06-10  Mg     1.80     06-10    TPro  6.6  /  Alb  3.8  /  TBili  0.5  /  DBili  x   /  AST  23  /  ALT  60<H>  /  AlkPhos  151<H>  06-10              RADIOLOGY & ADDITIONAL TESTS:    Imaging Personally Reviewed:    Consultant(s) Notes Reviewed:      Care Discussed with Consultants/Other Providers:   Patient is a 31y old  Female who presents with a chief complaint of chest and abd pain (09 Jun 2022 09:29)      SUBJECTIVE / OVERNIGHT EVENTS: patient seen and examined by bedside, pt feeling better than before, still reports mild RUQ discomfort, nausea and  vomiting resolved, moving bowels, tolerating diet,  afebrile,     MEDICATIONS  (STANDING):  pantoprazole    Tablet 40 milliGRAM(s) Oral daily    MEDICATIONS  (PRN):  acetaminophen     Tablet .. 650 milliGRAM(s) Oral every 6 hours PRN Temp greater or equal to 38C (100.4F), Mild Pain (1 - 3)  aluminum hydroxide/magnesium hydroxide/simethicone Suspension 30 milliLiter(s) Oral every 4 hours PRN Dyspepsia  gabapentin 100 milliGRAM(s) Oral daily PRN back pain      Vital Signs Last 24 Hrs  T(C): 36.5 (10 [place] 2022 05:45), Max: 36.6 (09 Jun 2022 12:43)  T(F): 97.7 (10 [place] 2022 05:45), Max: 97.9 (09 Jun 2022 12:43)  HR: 66 (10 [place] 2022 05:45) (63 - 66)  BP: 98/72 (10 [place] 2022 05:45) (96/63 - 98/72)  BP(mean): --  RR: 18 (10 [place] 2022 05:45) (18 - 18)  SpO2: 100% (10 [place] 2022 05:45) (100% - 100%)  CAPILLARY BLOOD GLUCOSE        I&O's Summary    PHYSICAL EXAM:  GENERAL: NAD, well-developed  HEAD:  Atraumatic, Normocephalic  EYES: EOMI, PERRLA, conjunctiva and sclera clear  NECK: Supple, No JVD  CHEST/LUNG: Clear to auscultation bilaterally; No wheeze  HEART: Regular rate and rhythm; No murmurs, rubs, or gallops  ABDOMEN: Soft, mil ttp in epigastric and RUQ  area, ,Nondistended; Bowel sounds present  EXTREMITIES:  2+ Peripheral Pulses, No clubbing, cyanosis, or edema  PSYCH: AAOx3  NEUROLOGY: non-focal  SKIN: No rashes or lesions      LABS:                        11.7   6.59  )-----------( 218      ( 10 [place] 2022 05:41 )             35.7     06-10    135  |  101  |  7   ----------------------------<  94  3.5   |  26  |  0.55    Ca    9.3      10 [place] 2022 05:41  Phos  3.5     06-10  Mg     1.80     06-10    TPro  6.6  /  Alb  3.8  /  TBili  0.5  /  DBili  x   /  AST  23  /  ALT  60<H>  /  AlkPhos  151<H>  06-10              RADIOLOGY & ADDITIONAL TESTS:  < from: MR MRCP w/wo IV Cont (06.09.22 @ 18:01) >    PROCEDURE:  MRI of the abdomen was performed.  MRCP was performed.    FINDINGS:  LOWER CHEST: Within normal limits.    LIVER: Within normal limits.  BILE DUCTS: Normal caliber.  GALLBLADDER: Cholecystectomy.  SPLEEN: Within normal limits.  PANCREAS: Within normal limits.  ADRENALS: Within normal limits.  KIDNEYS/URETERS: Within normal limits.    VISUALIZED PORTIONS:  BOWEL: Within normal limits.  PERITONEUM: No ascites.  VESSELS: Within normal limits.  RETROPERITONEUM/LYMPH NODES: No lymphadenopathy.  ABDOMINAL WALL: Within normal limits.  BONES: Linear enhancing tract posteriorly in the soft tissues at right   L4-L5, likely from prior surgical change. Soft tissue in the right spinal   canal at this level, likely represents known disc extrusion, better   visualized on MRI 4/14/2022.    IMPRESSION:  Status post cholecystectomy. Normal biliary tree without   choledocholithiasis.    < end of copied text >    Imaging Personally Reviewed:    Consultant(s) Notes Reviewed:      Care Discussed with Consultants/Other Providers:

## 2022-06-10 NOTE — DISCHARGE NOTE PROVIDER - NSDCFUSCHEDAPPT_GEN_ALL_CORE_FT
Alejandro Chaudhary  [facility] Physician [facility]  INTMED [address] [facility]  Scheduled Appointment: 06/23/2022    Agueda Butcher  [facility]  NeuroSurg [address]  Scheduled Appointment: 07/27/2022

## 2022-06-10 NOTE — PROGRESS NOTE ADULT - PROBLEM SELECTOR PLAN 3
pt s/p lami and discectomy. on gabapentin prn for chronic pain, will continue

## 2022-06-10 NOTE — DISCHARGE NOTE NURSING/CASE MANAGEMENT/SOCIAL WORK - NSTRANSFERBELONGINGSDISPO_GEN_A_NUR
Urinary Tract Infection in Children: Care Instructions  Your Care Instructions    A urinary tract infection, or UTI, is an infection that can occur anywhere between the kidneys and the urethra (where the urine comes out). Most UTIs are in the bladder. They often cause fever and pain when the child urinates. UTIs must be treated right away in infants and children. An infection that is not treated quickly can lead to kidney infection. Children who take medicine to treat the infection usually heal completely. Follow-up care is a key part of your child's treatment and safety. Be sure to make and go to all appointments, and call your doctor if your child is having problems. It's also a good idea to know your child's test results and keep a list of the medicines your child takes. How can you care for your child at home? · If the doctor prescribed antibiotics for your child, give them as directed. Do not stop using them just because your child feels better. Your child needs to take the full course of antibiotics. · The doctor may also give your child a medicine to ease the burning pain of a UTI. This will often turn the urine red or orange. The urine will return to its normal color after your child stops the medicine. · Try to get your child to drink extra fluids for the next 24 hours. This will help flush bacteria out of the bladder. Do not give your child drinks that have caffeine or that are carbonated. They can make the bladder sore. · Tell your child to urinate often and to empty his or her bladder each time. · A warm bath may help your child feel better. Soaps and bubble baths can cause irritation. Wait until the end of the bath to use soap. Preventing future UTIs  · Make sure that your child drinks plenty of water each day. This helps your child urinate often, which clears bacteria from the body. · Encourage your child to urinate as soon as he or she needs to. When should you call for help?   Call your doctor now or seek immediate medical care if:    · Your child is vomiting and cannot keep the medicine down.     · Your child cannot urinate at all.     · Your child has a new or higher fever or chills.     · Your child gets a new pain in the back just below the rib cage. This is called flank pain. (A very young child will not be able to tell you whether he or she has flank pain.)     · Your child's symptoms do not improve, or they go away and then return. These symptoms may include pain or burning when your child urinates; cloudy or discolored urine; a bad smell to the urine; or not being able to pass much urine.    Watch closely for changes in your child's health, and be sure to contact your doctor if:    · Your child does not start to get better within 2 days. Where can you learn more? Go to http://luma-cathy.info/. Enter A214 in the search box to learn more about \"Urinary Tract Infection in Children: Care Instructions. \"  Current as of: March 21, 2018  Content Version: 11.8  © 7481-5243 [employer]. Care instructions adapted under license by [employer] (which disclaims liability or warranty for this information). If you have questions about a medical condition or this instruction, always ask your healthcare professional. [address] any warranty or liability for your use of this information. with patient

## 2022-06-10 NOTE — DISCHARGE NOTE PROVIDER - NSDCCPCAREPLAN_GEN_ALL_CORE_FT
PRINCIPAL DISCHARGE DIAGNOSIS  Diagnosis: Abdominal pain  Assessment and Plan of Treatment: You were admitted to [facility] with burning abdominal pain radiating to the chest likely due to gastric reflux and irritation / inflammation of the stomach lining. You were treated with IV Protonix medical therapy and your pain improved. You should start taking Pantoprazole 40 mg by mouth once daily as prescribed upon discharge to help relief symptoms of gastric reflux / gastritis. Your liver enzymes were also elevated. You had an MRI which showed normal biliary tree (area connected to gallbladder) without any stones in the gallbladder ducts. Your liver enzymes downtrended and improved. You should follow up with your PCP upon discharge for continuous monitoring of liver enzymes.   You may also take over the counter SImethicone and/or Mylanta as needed for relief of gas pains or upset stomach. You may take over the counter stool softener Senna and/or laxative Miralax as needed for constipation.      SECONDARY DISCHARGE DIAGNOSES  Diagnosis: Chest pain  Assessment and Plan of Treatment: You were admitted to [facility] with burning chest pain likely due to gastric reflux like symptoms originating in the abdomen. Your cardiac enzymes were negative. Your chest pain resolved. Continue taking Pantoprazole as prescribed.    Diagnosis: Back pain  Assessment and Plan of Treatment: Continue home medication gabapentin.

## 2022-06-15 ENCOUNTER — NON-APPOINTMENT (OUTPATIENT)
Age: 32
End: 2022-06-15

## 2022-06-23 ENCOUNTER — APPOINTMENT (OUTPATIENT)
Dept: INTERNAL MEDICINE | Facility: CLINIC | Age: 32
End: 2022-06-23
Payer: MEDICAID

## 2022-06-23 VITALS
WEIGHT: 159 LBS | SYSTOLIC BLOOD PRESSURE: 110 MMHG | BODY MASS INDEX: 29.26 KG/M2 | HEIGHT: 62 IN | DIASTOLIC BLOOD PRESSURE: 80 MMHG

## 2022-06-23 DIAGNOSIS — K21.9 GASTRO-ESOPHAGEAL REFLUX DISEASE W/OUT ESOPHAGITIS: ICD-10-CM

## 2022-06-23 DIAGNOSIS — E55.9 VITAMIN D DEFICIENCY, UNSPECIFIED: ICD-10-CM

## 2022-06-23 PROCEDURE — 99214 OFFICE O/P EST MOD 30 MIN: CPT | Mod: 25

## 2022-06-23 NOTE — ASSESSMENT
[FreeTextEntry1] : Esophageal reflux-Protonix 40 mg daily 30 minutes before breakfast.  EGD\par Epigastric pain-history of very high LDL.  Rule out coronary artery atherosclerosis.  RX CT calcium score (NW)\par LFT.  iron etc\par Follow TFT\par Follow hemoglobin A1c\par Blood was drawn at office today.\par Patient currently has 2 children.  Patient has no plans for additional children in the future.  Patient is not sexually active- currently lives in [facility]\par Follow 25 [place] vitamin D

## 2022-06-23 NOTE — HISTORY OF PRESENT ILLNESS
[FreeTextEntry1] : Chest pain and abdominal pain [de-identified] : in hospital last week with chest pain and abdominal pain.\par Chest CT was negative for PE\par MRI of abdomen-no pathology\par Patient still complains of mild esophageal reflux and dull epigastric pain.  No shortness of breath.  No fever

## 2022-06-23 NOTE — PHYSICAL EXAM
[No Acute Distress] : no acute distress [No Respiratory Distress] : no respiratory distress  [No Accessory Muscle Use] : no accessory muscle use [Clear to Auscultation] : lungs were clear to auscultation bilaterally [Normal Rate] : normal rate  [Regular Rhythm] : with a regular rhythm [Soft] : abdomen soft [No CVA Tenderness] : no CVA  tenderness [Normal Gait] : normal gait [Normal Affect] : the affect was normal [Normal Mood] : the mood was normal [de-identified] : Mild epigastric tenderness, no rebound tenderness

## 2022-06-24 LAB
ALBUMIN SERPL ELPH-MCNC: 4.5 G/DL
ALP BLD-CCNC: 118 U/L
ALT SERPL-CCNC: 25 U/L
AMYLASE/CREAT SERPL: 51 U/L
ANION GAP SERPL CALC-SCNC: 14 MMOL/L
AST SERPL-CCNC: 20 U/L
BASOPHILS # BLD AUTO: 0.03 K/UL
BASOPHILS NFR BLD AUTO: 0.4 %
BILIRUB SERPL-MCNC: 0.4 MG/DL
BUN SERPL-MCNC: 11 MG/DL
CALCIUM SERPL-MCNC: 9.3 MG/DL
CERULOPLASMIN SERPL-MCNC: 31 MG/DL
CHLORIDE SERPL-SCNC: 106 MMOL/L
CO2 SERPL-SCNC: 18 MMOL/L
CREAT SERPL-MCNC: 0.5 MG/DL
EGFR: 130 ML/MIN/1.73M2
EOSINOPHIL # BLD AUTO: 0.42 K/UL
EOSINOPHIL NFR BLD AUTO: 5 %
GLUCOSE SERPL-MCNC: 98 MG/DL
HCT VFR BLD CALC: 37.6 %
HGB BLD-MCNC: 12 G/DL
IMM GRANULOCYTES NFR BLD AUTO: 0.2 %
IRON SATN MFR SERPL: 9 %
IRON SERPL-MCNC: 37 UG/DL
LPL SERPL-CCNC: 18 U/L
LYMPHOCYTES # BLD AUTO: 2.58 K/UL
LYMPHOCYTES NFR BLD AUTO: 30.7 %
MAN DIFF?: NORMAL
MCHC RBC-ENTMCNC: 27.9 PG
MCHC RBC-ENTMCNC: 31.9 GM/DL
MCV RBC AUTO: 87.4 FL
MONOCYTES # BLD AUTO: 0.52 K/UL
MONOCYTES NFR BLD AUTO: 6.2 %
NEUTROPHILS # BLD AUTO: 4.83 K/UL
NEUTROPHILS NFR BLD AUTO: 57.5 %
PLATELET # BLD AUTO: 291 K/UL
POTASSIUM SERPL-SCNC: 4 MMOL/L
PROT SERPL-MCNC: 7.8 G/DL
RBC # BLD: 4.3 M/UL
RBC # FLD: 12.5 %
SODIUM SERPL-SCNC: 139 MMOL/L
TIBC SERPL-MCNC: 409 UG/DL
UIBC SERPL-MCNC: 372 UG/DL
WBC # FLD AUTO: 8.4 K/UL

## 2022-06-25 LAB
ALBUMIN MFR SERPL ELPH: 53.8 %
ALBUMIN SERPL-MCNC: 4.2 G/DL
ALBUMIN/GLOB SERPL: 1.2 RATIO
ALPHA1 GLOB MFR SERPL ELPH: 4.3 %
ALPHA1 GLOB SERPL ELPH-MCNC: 0.3 G/DL
ALPHA2 GLOB MFR SERPL ELPH: 12.2 %
ALPHA2 GLOB SERPL ELPH-MCNC: 1 G/DL
B-GLOBULIN MFR SERPL ELPH: 13.5 %
B-GLOBULIN SERPL ELPH-MCNC: 1.1 G/DL
GAMMA GLOB FLD ELPH-MCNC: 1.3 G/DL
GAMMA GLOB MFR SERPL ELPH: 16.2 %
INTERPRETATION SERPL IEP-IMP: NORMAL
PROT SERPL-MCNC: 7.8 G/DL
PROT SERPL-MCNC: 7.8 G/DL

## 2022-06-28 ENCOUNTER — APPOINTMENT (OUTPATIENT)
Dept: CT IMAGING | Facility: CLINIC | Age: 32
End: 2022-06-28

## 2022-07-05 ENCOUNTER — APPOINTMENT (OUTPATIENT)
Dept: GASTROENTEROLOGY | Facility: CLINIC | Age: 32
End: 2022-07-05

## 2022-07-05 LAB — SARS-COV-2 N GENE NPH QL NAA+PROBE: NOT DETECTED

## 2022-07-19 ENCOUNTER — APPOINTMENT (OUTPATIENT)
Dept: INTERNAL MEDICINE | Facility: CLINIC | Age: 32
End: 2022-07-19

## 2022-07-25 ENCOUNTER — NON-APPOINTMENT (OUTPATIENT)
Age: 32
End: 2022-07-25

## 2022-07-27 ENCOUNTER — APPOINTMENT (OUTPATIENT)
Dept: NEUROSURGERY | Facility: CLINIC | Age: 32
End: 2022-07-27

## 2022-07-27 VITALS
HEIGHT: 62 IN | DIASTOLIC BLOOD PRESSURE: 85 MMHG | SYSTOLIC BLOOD PRESSURE: 124 MMHG | WEIGHT: 160.93 LBS | BODY MASS INDEX: 29.62 KG/M2 | OXYGEN SATURATION: 99 % | HEART RATE: 76 BPM

## 2022-07-27 PROCEDURE — 99212 OFFICE O/P EST SF 10 MIN: CPT

## 2022-07-27 RX ORDER — OXYCODONE 5 MG/1
5 TABLET ORAL
Refills: 0 | Status: DISCONTINUED | COMMUNITY
End: 2022-07-27

## 2022-07-27 RX ORDER — GABAPENTIN 100 MG/1
100 CAPSULE ORAL TWICE DAILY
Qty: 60 | Refills: 1 | Status: ACTIVE | COMMUNITY
Start: 2022-05-04 | End: 1900-01-01

## 2022-07-27 RX ORDER — PANTOPRAZOLE 40 MG/1
40 TABLET, DELAYED RELEASE ORAL
Qty: 30 | Refills: 3 | Status: DISCONTINUED | COMMUNITY
Start: 2022-06-23 | End: 2022-07-27

## 2022-07-27 RX ORDER — CYCLOBENZAPRINE HYDROCHLORIDE 5 MG/1
5 TABLET, FILM COATED ORAL
Refills: 0 | Status: DISCONTINUED | COMMUNITY
End: 2022-07-27

## 2022-07-27 RX ORDER — GABAPENTIN 100 MG/1
100 CAPSULE ORAL TWICE DAILY
Qty: 60 | Refills: 1 | Status: DISCONTINUED | COMMUNITY
End: 2022-07-27

## 2022-07-27 RX ORDER — OMEPRAZOLE 40 MG/1
40 CAPSULE, DELAYED RELEASE ORAL
Qty: 1 | Refills: 1 | Status: DISCONTINUED | COMMUNITY
Start: 2021-01-13 | End: 2022-07-27

## 2022-07-28 NOTE — REASON FOR VISIT
[Follow-Up: _____] : a [unfilled] follow-up visit [Interpreters_IDNumber] : [id number] [Interpreters_FullName] : JAZZMINE [FreeTextEntry3] : HYACINTH [TWNoteComboBox1] : Other [FreeTextEntry1] : Today she reports persistent right sided lowerback pain.\par She reports that she got better 2 months after surgery but now she feels like her pain has increased.\par She reports that the pain radiate down her right later leg into her right foot. She repots mild swelling in her right ankle. She denies any gross calf tenderness. She reports that she is back to work at [employer]. She states that her pain is increased with standing >20 minutes. She denies any motor weakness in her right leg. She denies any bowel bladder difficulty. She is participating in ongoing PT

## 2022-07-28 NOTE — PHYSICAL EXAM
[General Appearance - Alert] : alert [General Appearance - In No Acute Distress] : in no acute distress [Oriented To Time, Place, And Person] : oriented to person, place, and time [No Visual Abnormalities] : no visible abnormailities [Neck Appearance] : the appearance of the neck was normal [] : no respiratory distress [Abnormal Walk] : normal gait [Heart Rate And Rhythm] : heart rate was normal and rhythm regular

## 2022-07-28 NOTE — REVIEW OF SYSTEMS
[Feeling Poorly] : feeling poorly [Numbness] : numbness [Tingling] : tingling [Abnormal Sensation] : an abnormal sensation [Arthralgias] : arthralgias [Joint Swelling] : joint swelling [Limb Pain] : limb pain [Negative] : Respiratory [Leg Weakness] : no leg weakness [Poor Coordination] : good coordination

## 2022-07-28 NOTE — ASSESSMENT
[FreeTextEntry1] : Ms. Jerrica Rodríguez is a 31 year old woman S/P Lumbar Diskectomy \par Residual Right sided  Lumbar radiculopathy. Referral to see PM&R for non surgical pain management.\par She will continue Gabapentin.\par Follow up in 6 weeks.\par \par Please see Dr. Butcher's dictation for details.\par I, Dr. Efren Butcher evaluated the patient with the nurse practitioner Osei Deutsch and established the plan of care. I personally discuss this patient with the nurse practitioner at the time of the visit. I agree with the assessment and plan as written, unless noted below.\par \par \par

## 2022-08-14 ENCOUNTER — OUTPATIENT (OUTPATIENT)
Dept: OUTPATIENT SERVICES | Facility: HOSPITAL | Age: 32
LOS: 1 days | End: 2022-08-14
Payer: MEDICAID

## 2022-08-14 ENCOUNTER — NON-APPOINTMENT (OUTPATIENT)
Age: 32
End: 2022-08-14

## 2022-08-14 ENCOUNTER — APPOINTMENT (OUTPATIENT)
Dept: MRI IMAGING | Facility: IMAGING CENTER | Age: 32
End: 2022-08-14

## 2022-08-14 DIAGNOSIS — Z98.890 OTHER SPECIFIED POSTPROCEDURAL STATES: Chronic | ICD-10-CM

## 2022-08-14 DIAGNOSIS — G83.4 CAUDA EQUINA SYNDROME: ICD-10-CM

## 2022-08-14 DIAGNOSIS — Z90.49 ACQUIRED ABSENCE OF OTHER SPECIFIED PARTS OF DIGESTIVE TRACT: Chronic | ICD-10-CM

## 2022-08-14 PROCEDURE — A9585: CPT

## 2022-08-14 PROCEDURE — 72158 MRI LUMBAR SPINE W/O & W/DYE: CPT | Mod: 26

## 2022-08-14 PROCEDURE — 72158 MRI LUMBAR SPINE W/O & W/DYE: CPT

## 2022-08-31 ENCOUNTER — APPOINTMENT (OUTPATIENT)
Dept: PHYSICAL MEDICINE AND REHAB | Facility: CLINIC | Age: 32
End: 2022-08-31

## 2022-08-31 ENCOUNTER — APPOINTMENT (OUTPATIENT)
Dept: NEUROSURGERY | Facility: CLINIC | Age: 32
End: 2022-08-31

## 2022-09-14 ENCOUNTER — APPOINTMENT (OUTPATIENT)
Dept: NEUROSURGERY | Facility: CLINIC | Age: 32
End: 2022-09-14

## 2022-09-14 VITALS
HEART RATE: 65 BPM | DIASTOLIC BLOOD PRESSURE: 78 MMHG | BODY MASS INDEX: 28.89 KG/M2 | HEIGHT: 62 IN | WEIGHT: 157 LBS | OXYGEN SATURATION: 98 % | SYSTOLIC BLOOD PRESSURE: 115 MMHG

## 2022-09-14 DIAGNOSIS — G83.4 CAUDA EQUINA SYNDROME: ICD-10-CM

## 2022-09-14 PROCEDURE — 99212 OFFICE O/P EST SF 10 MIN: CPT

## 2022-09-14 NOTE — PHYSICAL EXAM
[General Appearance - Alert] : alert [Oriented To Time, Place, And Person] : oriented to person, place, and time [Impaired Insight] : insight and judgment were intact [] : no respiratory distress [Heart Rate And Rhythm] : heart rate was normal and rhythm regular [Abnormal Walk] : normal gait

## 2022-09-16 NOTE — ASSESSMENT
[FreeTextEntry1] : Ms. Jerrica Rodríguez is 31 year old woman S/P Lumbar rdsikectomy\par Persistent Right Leg pain. No new Neurological Deficits.\par DONAVON recommended and referral provided.\par \par \par Please see Dr. Butcher's dictation for details.\par I, Dr. Efren Butcher evaluated the patient with the nurse practitioner Osei Deutsch and established the plan of care. I personally discuss this patient with the nurse practitioner at the time of the visit. I agree with the assessment and plan as written, unless noted below.\par \par

## 2022-09-16 NOTE — REASON FOR VISIT
[Follow-Up: _____] : a [unfilled] follow-up visit [FreeTextEntry1] : Persistent back and right leg pain\par Better with some PT and gabapentin

## 2022-09-24 ENCOUNTER — NON-APPOINTMENT (OUTPATIENT)
Age: 32
End: 2022-09-24

## 2022-09-27 NOTE — DISCHARGE NOTE OB - BREAST MILK IS MORE DIGESTIBLE, MAKING VOMITING, DIARRHEA, GAS AND CONSTIPATION LESS COMMON
Statement Selected Protopic Counseling: Patient may experience a mild burning sensation during topical application. Protopic is not approved in children less than 2 years of age. There have been case reports of hematologic and skin malignancies in patients using topical calcineurin inhibitors although causality is questionable.

## 2023-01-20 ENCOUNTER — APPOINTMENT (OUTPATIENT)
Dept: INTERNAL MEDICINE | Facility: CLINIC | Age: 33
End: 2023-01-20
Payer: MEDICAID

## 2023-01-20 PROCEDURE — 36415 COLL VENOUS BLD VENIPUNCTURE: CPT

## 2023-01-21 LAB
25(OH)D3 SERPL-MCNC: 29.1 NG/ML
A1AT SERPL-MCNC: 134 MG/DL
ALBUMIN SERPL ELPH-MCNC: 4.4 G/DL
ALP BLD-CCNC: 84 U/L
ALT SERPL-CCNC: 39 U/L
ANION GAP SERPL CALC-SCNC: 10 MMOL/L
AST SERPL-CCNC: 21 U/L
BASOPHILS # BLD AUTO: 0.05 K/UL
BASOPHILS NFR BLD AUTO: 0.7 %
BILIRUB SERPL-MCNC: 0.7 MG/DL
BUN SERPL-MCNC: 14 MG/DL
CALCIUM SERPL-MCNC: 9.6 MG/DL
CHLORIDE SERPL-SCNC: 105 MMOL/L
CHOLEST SERPL-MCNC: 239 MG/DL
CO2 SERPL-SCNC: 20 MMOL/L
CREAT SERPL-MCNC: 0.55 MG/DL
EGFR: 126 ML/MIN/1.73M2
EOSINOPHIL # BLD AUTO: 0.14 K/UL
EOSINOPHIL NFR BLD AUTO: 1.8 %
ESTIMATED AVERAGE GLUCOSE: 120 MG/DL
FOLATE SERPL-MCNC: 9.2 NG/ML
GLUCOSE SERPL-MCNC: 96 MG/DL
HBA1C MFR BLD HPLC: 5.8 %
HCT VFR BLD CALC: 41.6 %
HDLC SERPL-MCNC: 49 MG/DL
HGB BLD-MCNC: 13.7 G/DL
IMM GRANULOCYTES NFR BLD AUTO: 0.4 %
IRON SATN MFR SERPL: 31 %
IRON SERPL-MCNC: 148 UG/DL
LDLC SERPL CALC-MCNC: 149 MG/DL
LYMPHOCYTES # BLD AUTO: 3.04 K/UL
LYMPHOCYTES NFR BLD AUTO: 39.9 %
MAN DIFF?: NORMAL
MCHC RBC-ENTMCNC: 28.8 PG
MCHC RBC-ENTMCNC: 32.9 GM/DL
MCV RBC AUTO: 87.4 FL
MONOCYTES # BLD AUTO: 0.43 K/UL
MONOCYTES NFR BLD AUTO: 5.7 %
NEUTROPHILS # BLD AUTO: 3.92 K/UL
NEUTROPHILS NFR BLD AUTO: 51.5 %
NONHDLC SERPL-MCNC: 190 MG/DL
PLATELET # BLD AUTO: 237 K/UL
POTASSIUM SERPL-SCNC: 4.1 MMOL/L
PROT SERPL-MCNC: 7.7 G/DL
RBC # BLD: 4.76 M/UL
RBC # FLD: 13.2 %
SODIUM SERPL-SCNC: 136 MMOL/L
T4 FREE SERPL-MCNC: 1 NG/DL
TIBC SERPL-MCNC: 480 UG/DL
TRIGL SERPL-MCNC: 201 MG/DL
TSH SERPL-ACNC: 4 UIU/ML
UIBC SERPL-MCNC: 332 UG/DL
VIT B12 SERPL-MCNC: 378 PG/ML
WBC # FLD AUTO: 7.61 K/UL

## 2023-01-23 LAB — SMOOTH MUSCLE AB SER QL IF: NORMAL

## 2023-01-24 ENCOUNTER — APPOINTMENT (OUTPATIENT)
Dept: INTERNAL MEDICINE | Facility: CLINIC | Age: 33
End: 2023-01-24
Payer: MEDICAID

## 2023-01-24 VITALS
BODY MASS INDEX: 28.52 KG/M2 | DIASTOLIC BLOOD PRESSURE: 70 MMHG | WEIGHT: 155 LBS | SYSTOLIC BLOOD PRESSURE: 110 MMHG | HEIGHT: 62 IN

## 2023-01-24 DIAGNOSIS — R73.01 IMPAIRED FASTING GLUCOSE: ICD-10-CM

## 2023-01-24 PROCEDURE — G0008: CPT

## 2023-01-24 PROCEDURE — 90682 RIV4 VACC RECOMBINANT DNA IM: CPT

## 2023-01-24 PROCEDURE — 99213 OFFICE O/P EST LOW 20 MIN: CPT | Mod: 25

## 2023-01-24 NOTE — HISTORY OF PRESENT ILLNESS
[FreeTextEntry1] : Hyperlipidemia, high glucose, subclinical hypothyroid, elevated LFT, and anemia [de-identified] : no complaints\par

## 2023-01-24 NOTE — ASSESSMENT
[FreeTextEntry1] : Hyperlipidemia-follow lipid.  Want LDL<100.  Want HDL> 50\par Patient understands importance of weight reduction\par COVID-19 omicron specific booster vaccine was recommended to the patient.\par Flu vaccine\par Follow hemoglobin A1c\par Follow TFT\par Follow LFT follow CBC\par

## 2023-01-26 ENCOUNTER — MED ADMIN CHARGE (OUTPATIENT)
Age: 33
End: 2023-01-26

## 2023-02-13 NOTE — ED PROVIDER NOTE - EMPLOYMENT
Procedure:  COLONOSCOPY    Relevant Problems   No relevant active problems        Physical Exam    Airway    Mallampati score: I  TM Distance: >3 FB  Neck ROM: full     Dental       Cardiovascular  Rhythm: regular, Rate: normal, Cardiovascular exam normal    Pulmonary  Pulmonary exam normal     Other Findings        Anesthesia Plan  ASA Score- 2     Anesthesia Type- general with ASA Monitors  Additional Monitors:   Airway Plan:           Plan Factors-Exercise tolerance (METS): >4 METS  Chart reviewed  Existing labs reviewed  Patient summary reviewed  Patient is not a current smoker  Obstructive sleep apnea risk education given perioperatively  Induction- intravenous  Postoperative Plan-     Informed Consent- Anesthetic plan and risks discussed with patient 
N/A

## 2023-05-08 ENCOUNTER — APPOINTMENT (OUTPATIENT)
Dept: OBGYN | Facility: CLINIC | Age: 33
End: 2023-05-08
Payer: MEDICAID

## 2023-05-08 VITALS
DIASTOLIC BLOOD PRESSURE: 87 MMHG | WEIGHT: 164.4 LBS | HEART RATE: 67 BPM | HEIGHT: 62 IN | SYSTOLIC BLOOD PRESSURE: 126 MMHG | BODY MASS INDEX: 30.25 KG/M2

## 2023-05-08 PROCEDURE — 99395 PREV VISIT EST AGE 18-39: CPT

## 2023-05-08 NOTE — PLAN
[FreeTextEntry1] : 33 y/o presents for annual GYN visit. \par \par #HM\par -Pap with HPV today\par -Declines STI panel \par -Pt desires to discuss contraception at next visit\par \par #Pelvic pain\par -UCx sent \par -Requisition for pelvic sono provided \par -RTO after sonogram\par  \par riaz JOSEPH

## 2023-05-08 NOTE — PHYSICAL EXAM
[Chaperone Present] : A chaperone was present in the examining room during all aspects of the physical examination [FreeTextEntry1] : JOSE Pate [Appropriately responsive] : appropriately responsive [Alert] : alert [No Acute Distress] : no acute distress [No Lymphadenopathy] : no lymphadenopathy [Regular Rate Rhythm] : regular rate rhythm [No Murmurs] : no murmurs [Clear to Auscultation B/L] : clear to auscultation bilaterally [Soft] : soft [Non-tender] : non-tender [Non-distended] : non-distended [No HSM] : No HSM [No Lesions] : no lesions [No Mass] : no mass [Oriented x3] : oriented x3 [Examination Of The Breasts] : a normal appearance [No Discharge] : no discharge [No Masses] : no breast masses were palpable [Labia Majora] : normal [Labia Minora] : normal [Normal] : normal [Enlarged ___ wks] : not enlarged [Uterine Adnexae] : normal [FreeTextEntry8] : Pt reported pain anteriorly on bimanual exam

## 2023-05-10 LAB
BACTERIA UR CULT: NORMAL
HPV HIGH+LOW RISK DNA PNL CVX: NOT DETECTED

## 2023-05-12 LAB — CYTOLOGY CVX/VAG DOC THIN PREP: NORMAL

## 2023-05-15 ENCOUNTER — APPOINTMENT (OUTPATIENT)
Dept: OBGYN | Facility: CLINIC | Age: 33
End: 2023-05-15

## 2023-05-15 ENCOUNTER — ASOB RESULT (OUTPATIENT)
Age: 33
End: 2023-05-15

## 2023-07-03 ENCOUNTER — APPOINTMENT (OUTPATIENT)
Dept: OBGYN | Facility: CLINIC | Age: 33
End: 2023-07-03

## 2023-07-03 NOTE — ED PROVIDER NOTE - ATTENDING CONTRIBUTION TO CARE
I informed pt of message, but she wanted to know which specific one she can take since she has dm, there is a 1, 3 or 7 day treatment? Plese advise, pt wants to be called @ [phone]/ mk    George Pérez DO:  patient seen and evaluated with the resident.  I was present for key portions of the History & Physical, and I agree with the Impression & Plan. 32 yo f s/p S/P L4-5 rebeca laminectomy for discectomy 4/15/22, pw cp. reports acute onset sx, last night. sharp, intermittent, non radiating, pleur, non exer, non positional, worse w/ pressure, unk remitting sx, no prior hx. last night had associated abd pain, which has RESOLVED. mild sob. mild fatigue. denies f/c, cough, congestion, gu sx. arrives hds, well appearing. rrr, no m/g/r, lungsctabl. will check for pe given recent procedure. labs, imaging, reassess. on prior ekg v3 inverted now upright today, however on prior pt was mason to 38, unclear if rate related.

## 2023-07-12 ENCOUNTER — APPOINTMENT (OUTPATIENT)
Dept: INTERNAL MEDICINE | Facility: CLINIC | Age: 33
End: 2023-07-12
Payer: MEDICAID

## 2023-07-12 ENCOUNTER — LABORATORY RESULT (OUTPATIENT)
Age: 33
End: 2023-07-12

## 2023-07-12 PROCEDURE — 36415 COLL VENOUS BLD VENIPUNCTURE: CPT

## 2023-07-13 LAB
25(OH)D3 SERPL-MCNC: 30.2 NG/ML
ALBUMIN SERPL ELPH-MCNC: 4.4 G/DL
ALP BLD-CCNC: 75 U/L
ALT SERPL-CCNC: 29 U/L
ANION GAP SERPL CALC-SCNC: 13 MMOL/L
AST SERPL-CCNC: 25 U/L
BILIRUB SERPL-MCNC: 1 MG/DL
BUN SERPL-MCNC: 10 MG/DL
CALCIUM SERPL-MCNC: 9.6 MG/DL
CHLORIDE SERPL-SCNC: 105 MMOL/L
CHOLEST SERPL-MCNC: 264 MG/DL
CO2 SERPL-SCNC: 21 MMOL/L
CREAT SERPL-MCNC: 0.66 MG/DL
EGFR: 121 ML/MIN/1.73M2
ESTIMATED AVERAGE GLUCOSE: 114 MG/DL
GLUCOSE SERPL-MCNC: 87 MG/DL
HBA1C MFR BLD HPLC: 5.6 %
HDLC SERPL-MCNC: 46 MG/DL
LDLC SERPL CALC-MCNC: 180 MG/DL
NONHDLC SERPL-MCNC: 218 MG/DL
POTASSIUM SERPL-SCNC: 4.2 MMOL/L
PROT SERPL-MCNC: 7.7 G/DL
SODIUM SERPL-SCNC: 139 MMOL/L
T3RU NFR SERPL: 1.1 TBI
T4 FREE SERPL-MCNC: 1 NG/DL
TRIGL SERPL-MCNC: 202 MG/DL
TSH SERPL-ACNC: 4.74 UIU/ML

## 2023-07-18 ENCOUNTER — APPOINTMENT (OUTPATIENT)
Dept: INTERNAL MEDICINE | Facility: CLINIC | Age: 33
End: 2023-07-18

## 2023-08-23 ENCOUNTER — APPOINTMENT (OUTPATIENT)
Dept: INTERNAL MEDICINE | Facility: CLINIC | Age: 33
End: 2023-08-23
Payer: MEDICAID

## 2023-08-23 VITALS
HEIGHT: 62 IN | SYSTOLIC BLOOD PRESSURE: 120 MMHG | BODY MASS INDEX: 30 KG/M2 | WEIGHT: 163 LBS | DIASTOLIC BLOOD PRESSURE: 80 MMHG

## 2023-08-23 DIAGNOSIS — Z78.9 OTHER SPECIFIED HEALTH STATUS: ICD-10-CM

## 2023-08-23 DIAGNOSIS — Z91.89 OTHER SPECIFIED PERSONAL RISK FACTORS, NOT ELSEWHERE CLASSIFIED: ICD-10-CM

## 2023-08-23 DIAGNOSIS — Z00.00 ENCOUNTER FOR GENERAL ADULT MEDICAL EXAMINATION W/OUT ABNORMAL FINDINGS: ICD-10-CM

## 2023-08-23 PROCEDURE — 93000 ELECTROCARDIOGRAM COMPLETE: CPT

## 2023-08-23 PROCEDURE — 99395 PREV VISIT EST AGE 18-39: CPT | Mod: 25

## 2023-08-23 NOTE — ASSESSMENT
[FreeTextEntry1] :  Discussed with patient importance of healthy diet, regular exercise (> 2.5 hours/week), and maintaining healthy weight. TSH and free T4 results from last week were reviewed with patient. Hemoglobin A1c result from last week was reviewed with patient. Hyperlipidemia-want HDL>51.  want LDL<100.   Triglyceride, HDL, and LDL results from last week were reviewed with patient. Patient was instructed that next COVID-19 vaccine will be fall of this year. follow up in 6 months

## 2023-11-25 ENCOUNTER — EMERGENCY (EMERGENCY)
Facility: HOSPITAL | Age: 33
LOS: 1 days | Discharge: HOME | End: 2023-11-25
Attending: EMERGENCY MEDICINE | Admitting: EMERGENCY MEDICINE
Payer: MEDICAID

## 2023-11-25 VITALS
OXYGEN SATURATION: 100 % | HEART RATE: 66 BPM | DIASTOLIC BLOOD PRESSURE: 79 MMHG | SYSTOLIC BLOOD PRESSURE: 132 MMHG | TEMPERATURE: 99 F | RESPIRATION RATE: 16 BRPM

## 2023-11-25 DIAGNOSIS — Z90.49 ACQUIRED ABSENCE OF OTHER SPECIFIED PARTS OF DIGESTIVE TRACT: Chronic | ICD-10-CM

## 2023-11-25 DIAGNOSIS — Z98.890 OTHER SPECIFIED POSTPROCEDURAL STATES: Chronic | ICD-10-CM

## 2023-11-25 PROCEDURE — 93010 ELECTROCARDIOGRAM REPORT: CPT

## 2023-11-25 PROCEDURE — 99284 EMERGENCY DEPT VISIT MOD MDM: CPT

## 2023-11-25 RX ORDER — ACETAMINOPHEN 500 MG
2 TABLET ORAL
Qty: 100 | Refills: 0
Start: 2023-11-25

## 2023-11-25 RX ORDER — MECLIZINE HCL 12.5 MG
25 TABLET ORAL ONCE
Refills: 0 | Status: COMPLETED | OUTPATIENT
Start: 2023-11-25 | End: 2023-11-25

## 2023-11-25 RX ORDER — IBUPROFEN 200 MG
400 TABLET ORAL ONCE
Refills: 0 | Status: COMPLETED | OUTPATIENT
Start: 2023-11-25 | End: 2023-11-25

## 2023-11-25 RX ORDER — ACETAMINOPHEN 500 MG
975 TABLET ORAL ONCE
Refills: 0 | Status: COMPLETED | OUTPATIENT
Start: 2023-11-25 | End: 2023-11-25

## 2023-11-25 RX ORDER — MECLIZINE HCL 12.5 MG
1 TABLET ORAL
Qty: 30 | Refills: 0
Start: 2023-11-25

## 2023-11-25 RX ORDER — MELOXICAM 15 MG/1
1 TABLET ORAL
Qty: 21 | Refills: 1
Start: 2023-11-25

## 2023-11-25 RX ADMIN — Medication 25 MILLIGRAM(S): at 19:11

## 2023-11-25 RX ADMIN — Medication 30 MILLIGRAM(S): at 19:13

## 2023-11-25 RX ADMIN — Medication 975 MILLIGRAM(S): at 19:11

## 2023-11-25 RX ADMIN — Medication 400 MILLIGRAM(S): at 19:12

## 2023-11-25 NOTE — ED PROVIDER NOTE - NSFOLLOWUPINSTRUCTIONS_ED_ALL_ED_FT
Take medications as prescribed for: cervicogenic vertigo  Meclizine for dizziness  Prednisone to reduce swelling around pinched nerve  Tylenol 500 mg (1 or 2 every 6 hours) and/or Meloxicam 15 mg daily for pain control.    Return if unable to hold urine or stool (incontinent) or unable to walk. Do not do extended bed rest; instead, try to do usual activities as best as possible.     Return if pain not controlled with oral medications.    Follow with either:     Spine Surgeons Dr. Chavez:    [phone]  [email or web address]  Email: [email or web address]    1. [place] Address: [address], [facility] [address] [facility], [address]. Hours: Mon-Fri 9am - 5pm and every 2nd Sat 9am - 3pm.    2. [place] Address: [address] [facility] [address] [place] [address] (Inside [facility]). Hours: Every Thursday 10am - 5pm.    3. [place] Address: [address] [facility] [facility] [address] (Inside [facility]). Hours: 10am - 5pm Every Tuesday (except 2nd) & Every 2nd Wednesday.    Or spine surgeon Dr. Dale Betts:    1. [address] [facility].  [place], [place] [id number] [phone]    2. [address]  [place], [place] [id number] [phone]    Or    [facility] (comprehensive, with Neurology, Orthopedic Surgery, Neurosurgery, and Physical Therapy): [phone]. Take medications as prescribed for: cervicogenic vertigo  Meclizine for dizziness  Prednisone to reduce swelling around pinched nerve  Tylenol 500 mg (1 or 2 every 6 hours) and/or Meloxicam 15 mg daily for pain control.    Return if unable to hold urine or stool (incontinent) or unable to walk. Do not do extended bed rest; instead, try to do usual activities as best as possible.     Return if pain not controlled with oral medications.    Follow with either:     Your spine surgeon Dr. Agueda Butcher  Address: [address] [facility] [address], [place] [address]  Phone: [phone]    Or    Spine Surgeons Dr. Chavez:    [phone]  [email or web address]  Email: [email or web address]    1. [place] Address: [address] [facility] [address] [facility], [address]. Hours: Mon-Fri 9am - 5pm and every 2nd Sat 9am - 3pm.    2. [place] Address: [address] [facility] [address] [place] [address] (Inside [facility]). Hours: Every Thursday 10am - 5pm.    3. [place] Address: [address] [facility] [facility] [address] (Inside [facility]). Hours: 10am - 5pm Every Tuesday (except 2nd) & Every 2nd Wednesday.    Or spine surgeon Dr. Dale Betts:    1. [address] [facility] [place] [address]  [phone]    2. [address] [place] [address]  [phone]    Or    [facility] (comprehensive, with Neurology, Orthopedic Surgery, Neurosurgery, and Physical Therapy): [phone].

## 2023-11-25 NOTE — ED ADULT NURSE NOTE - OBJECTIVE STATEMENT
Patient received to intake bed 5 A&OX4, ambulatory, with no pertinent past medical history coming to the ED for complaints with complaints of right sided headache and dizziness x5 days. Denies blurry vision, LOC, chest pain, SOB, abdominal pain, N/V/D. RR equal and unlabored. No drift/facial asymmetry, drift noted. +strength and +sensation. in the bilateral upper and lower extremities. Medicated as ordered. Care plan continued. Comfort measures provided. Safety maintained.

## 2023-11-25 NOTE — ED PROVIDER NOTE - PHYSICAL EXAMINATION
Well-appearing, well nourished, awake, alert, oriented to person, place, time/situation and in no apparent distress.    Airway patent. Neck supple.    Eyes without scleral injection. No jaundice.    Strong pulse.    Respirations unlabored.    Abdomen soft, non-tender, no guarding.    MSK: Spine appears normal, range of motion is not limited, no muscle or joint tenderness.    Alert and oriented, no gross motor or sensory deficits. No nystagmus. Gait unremarkable. FNF intact.    Skin: normal color for race, warm, dry and intact. No evidence of rash.    No SI/HI.

## 2023-11-25 NOTE — ED PROVIDER NOTE - PATIENT PORTAL LINK FT
You can access the FollowMyHealth Patient Portal offered by [facility] by registering at the following website: [email or web address]. By joining [facility]’s FollowMyHealth portal, you will also be able to view your health information using other applications (apps) compatible with our system.

## 2023-11-25 NOTE — ED ADULT TRIAGE NOTE - CHIEF COMPLAINT QUOTE
pt c/o 5 days right side neck pain with pain in shoulder/ upper chest, with headache at times. denies med hx

## 2023-11-25 NOTE — ED PROVIDER NOTE - CLINICAL SUMMARY MEDICAL DECISION MAKING FREE TEXT BOX
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 No trauma/F/risks for early CV. Known lumbar disc herniation (ie, has spine DJD already). Pain/vertigo meds. Prednisone: [email or web address]/[email or web address]-treatments-[email or web address]-[email or web address]-disc#:~:text=Steroids%20work%20to%20reduce%20inflammation,symptoms%20before%20recommending%20additional%20treatment. Follow w/ Spine.

## 2023-11-25 NOTE — ED ADULT NURSE NOTE - NSICDXPASTSURGICALHX_GEN_ALL_CORE_FT
PAST SURGICAL HISTORY:  S/P cholecystectomy     S/P laminectomy     
Constitutional: no fever, chills, no recent weight loss, change in appetite or malaise  Cardiac: see HPI  Respiratory: see HPI  GI: No nausea, vomiting, diarrhea or abdominal pain.  : No dysuria, frequency, urgency or hematuria  MS: no pain to back or extremities, no loss of ROM, no weakness  Neuro: No headache or weakness. No LOC.  Skin: No skin rash.  Endocrine: No history of thyroid disease or diabetes.  Except as documented in the HPI, all other systems are negative.
43.4

## 2023-11-25 NOTE — ED PROVIDER NOTE - OBJECTIVE STATEMENT
Maria Esther: No relevant PMH other than lumbar disc herniation, PSH, or FHx. No significant T/E/D. No allergies. 5 days non-traumatic, non-febrile R neck pain w/ radiation to post. shoulder and a bit down post. triceps accompanied by on/off vertigo (more prominent today). Neck pain worse when turns head to R. No other neuro Sx. Maria Esther: No relevant PMH other than lumbar disc herniation (L4-5 laminectomy in 4/22 at [facility] by Agueda Butcher), PSH, or FHx. No significant T/E/D. No allergies. 5 days non-traumatic, non-febrile R neck pain w/ radiation to post. shoulder and a bit down post. triceps accompanied by on/off vertigo (more prominent today). Neck pain worse when turns head to R. No other neuro Sx. Split-Thickness Skin Graft Text: The defect edges were debeveled with a #15 scalpel blade.  Given the location of the defect, shape of the defect and the proximity to free margins a split thickness skin graft was deemed most appropriate.  Using a sterile surgical marker, the primary defect shape was transferred to the donor site. The split thickness graft was then harvested.  The skin graft was then placed in the primary defect and oriented appropriately.

## 2023-12-18 NOTE — ED PROVIDER NOTE - NS CPE EDP MUSC SACRAL LOC
[[id number]] [[id number]] no midline spine tenderness, no paraspinal tenderness, no erythema, no edema, no obvious deformity b/l.

## 2024-02-14 ENCOUNTER — APPOINTMENT (OUTPATIENT)
Dept: INTERNAL MEDICINE | Facility: CLINIC | Age: 34
End: 2024-02-14

## 2024-02-23 NOTE — H&P ADULT - NSHPPOAPULMEMBOLUS_GEN_A_CORE
Manjit is doing great. Keep up the good work! Here are just a few things to remember:    1. Show affection. Handle anger constructively. Reinforce limits/appropriate behavior.  2. Read, sing, and play rhyme games together. Encourage your child to talk about their friends and experiences.  3. Create opportunities for the family to share time and exercise together.  4. No more than 1-2 hours a day of media (TV, computers, video games, phone/tablet). No TV or other media in the bedroom.  5. Use a forward-facing car seat when traveling in the car. Always use a helmet when biking. Don't allow your child to cross the street alone.    Check out the online resource center available on the [facility] website for a wealth of information on pediatric health topics!  [email or web address]    We'll plan to see you again at your child's 6-year visit. Feel free to call us before your next appointment if you have any questions or concerns.   no

## 2024-03-01 ENCOUNTER — APPOINTMENT (OUTPATIENT)
Dept: INTERNAL MEDICINE | Facility: CLINIC | Age: 34
End: 2024-03-01
Payer: MEDICAID

## 2024-03-01 PROCEDURE — 36415 COLL VENOUS BLD VENIPUNCTURE: CPT

## 2024-03-03 LAB
ALBUMIN SERPL ELPH-MCNC: 4.3 G/DL
ALP BLD-CCNC: 86 U/L
ALT SERPL-CCNC: 28 U/L
ANION GAP SERPL CALC-SCNC: 11 MMOL/L
AST SERPL-CCNC: 25 U/L
BILIRUB SERPL-MCNC: 0.3 MG/DL
BUN SERPL-MCNC: 11 MG/DL
CALCIUM SERPL-MCNC: 9.5 MG/DL
CHLORIDE SERPL-SCNC: 104 MMOL/L
CHOLEST SERPL-MCNC: 239 MG/DL
CO2 SERPL-SCNC: 23 MMOL/L
CREAT SERPL-MCNC: 0.68 MG/DL
EGFR: 119 ML/MIN/1.73M2
ESTIMATED AVERAGE GLUCOSE: 108 MG/DL
GLUCOSE SERPL-MCNC: 86 MG/DL
HBA1C MFR BLD HPLC: 5.4 %
HCT VFR BLD CALC: 36.2 %
HDLC SERPL-MCNC: 43 MG/DL
HGB BLD-MCNC: 11.6 G/DL
LDLC SERPL CALC-MCNC: 170 MG/DL
MCHC RBC-ENTMCNC: 28.6 PG
MCHC RBC-ENTMCNC: 32 GM/DL
MCV RBC AUTO: 89.2 FL
NONHDLC SERPL-MCNC: 196 MG/DL
PLATELET # BLD AUTO: 221 K/UL
POTASSIUM SERPL-SCNC: 4.4 MMOL/L
PROT SERPL-MCNC: 7.8 G/DL
RBC # BLD: 4.06 M/UL
RBC # FLD: 13.9 %
SODIUM SERPL-SCNC: 139 MMOL/L
T4 FREE SERPL-MCNC: 1.2 NG/DL
TRIGL SERPL-MCNC: 144 MG/DL
TSH SERPL-ACNC: 3.99 UIU/ML
WBC # FLD AUTO: 6.45 K/UL

## 2024-03-11 ENCOUNTER — APPOINTMENT (OUTPATIENT)
Dept: INTERNAL MEDICINE | Facility: CLINIC | Age: 34
End: 2024-03-11
Payer: MEDICAID

## 2024-03-11 VITALS
BODY MASS INDEX: 30.18 KG/M2 | OXYGEN SATURATION: 98 % | HEART RATE: 75 BPM | HEIGHT: 62 IN | SYSTOLIC BLOOD PRESSURE: 120 MMHG | WEIGHT: 164 LBS | DIASTOLIC BLOOD PRESSURE: 80 MMHG

## 2024-03-11 DIAGNOSIS — D64.9 ANEMIA, UNSPECIFIED: ICD-10-CM

## 2024-03-11 DIAGNOSIS — E03.8 OTHER SPECIFIED HYPOTHYROIDISM: ICD-10-CM

## 2024-03-11 DIAGNOSIS — E78.00 PURE HYPERCHOLESTEROLEMIA, UNSPECIFIED: ICD-10-CM

## 2024-03-11 PROCEDURE — G2211 COMPLEX E/M VISIT ADD ON: CPT | Mod: NC,1L

## 2024-03-11 PROCEDURE — 99213 OFFICE O/P EST LOW 20 MIN: CPT

## 2024-03-11 NOTE — PHYSICAL EXAM
[No Acute Distress] : no acute distress [Well-Appearing] : well-appearing [No Respiratory Distress] : no respiratory distress  [Clear to Auscultation] : lungs were clear to auscultation bilaterally [No Accessory Muscle Use] : no accessory muscle use [Normal Rate] : normal rate  [Soft] : abdomen soft [Regular Rhythm] : with a regular rhythm

## 2024-03-11 NOTE — ASSESSMENT
[FreeTextEntry1] : Discussed with patient importance of healthy diet, regular exercise (> 2.5 hours/week), and maintaining healthy weight. CBC results from last week were reviewed with patient. Hyperlipidemia.  want HDL>51.  want LDL<100.   Triglyceride, HDL, and LDL results from last week were reviewed with patient. TSH and free T4 results from last week were reviewed with patient. COVID-19  booster vaccine is recommended once a year in fall.  If patient did not receive COVID-19 booster vaccine in fall 2023, I strongly recommend they go to local pharmacy for COVID-19 booster vaccine within next 2 weeks CPE in 6 months

## 2024-03-11 NOTE — HISTORY OF PRESENT ILLNESS
[de-identified] : no complaints [FreeTextEntry1] : Hyperlipidemia, anemia, and subclinical hypothyroid

## 2024-04-15 NOTE — DISCHARGE NOTE NURSING/CASE MANAGEMENT/SOCIAL WORK - MODE OF TRANSPORTATION
April 15, 2024        Debbie Lubin  [address] [place] [address]       Dear Ms. Lubin,    Please review the enclosed prep instructions for your procedure with Alex Quinn MD.    Procedure(s):  Colonoscopy   Date of Procedure: Tuesday, May 14, 2024  Time of Procedure: 10:00 AM  Arrival Time: 9:00 AM  Location:  [address], [address]  [place] [address]  [phone]  Please enter through the Physician[facility] Office [place]  : Open Access Scheduling Patient Line [phone]    Please read these instructions very carefully at least 7 days prior to your procedure.     If there are questions about these instructions, please call the office at [phone]. Prior to the procedure, the procedure center will be calling to go over your health history and medications to be taken on the day of the procedure. If you need to cancel or reschedule, please give the office a call within 3 days prior to the procedure.     Medication Holds     ANTIPLATELET / ANTICOAGULATION: MEDICATION:  None    MEDICATION HOLDS:  -N/A    Please contact your prescribing provider with any questions or concerns on medication holds.    Important Information:  You will not be allowed to drive home after the procedure due to the sedation. Please confirm you have an escort to take you home following the procedure. You cannot take a taxi cab, Uber, Lyft, ride share, bus, or other public transport home by yourself. Patients without an appropriate ride home will be cancelled    You are scheduled with Monitored Anesthesia Care (MAC) sedation, upon discharge you will need to have a legal adult (18 years or older) stay with you overnight the day of your procedure or your procedure will be canceled.    Colonoscopy Instructions - Suprep    7 Days prior to your procedure  Please ensure you've picked up your bowel prep at your pharmacy prior to your procedure in case there is a  problem with coverage of the medication by your insurance.  If possible, try to avoid non-steroidal anti-inflammatory drugs (Ibuprofen, Advil, Motrin, Aleve, Naproxen etc.).   Do not eat popcorn, seeds, nuts or whole kernel corn.       1 Day before your procedure  No solid food.  No alcohol.  Clear liquids ALL DAY. If you can see through it, you can drink it. Examples are clear broth or consommé, fruit juices without pulp (no orange or tomato), sports drinks (Gatorade, Propel etc.), Jell-O (no fruit added), coffee or tea (sweeteners are ok, no milk or cream), soda or non-alcoholic carbonated drinks, popsicles, water, and clear hard candies. Avoid red and purple colors.   It is important to try and stay hydrated during the day by drinking fluids. A solution such as Gatorade, or a similar sports drink, will help you to stay hydrated.  At 5:00 PM, pour one (1) 6-ounce bottle of Suprep liquid into the mixing container. Add cool drinking water to the 16-ounce line on the container and mix. Drink all the liquid in the container. Then drink two (2) more 16-ounce containers of water over the next 1 hour.   You can still continue to be on the clear liquid diet while prepping.      Day of the procedure  No solid food. No alcohol.  6 hours prior to leaving the house, pour one (1) 6-ounce bottle of Suprep liquid into the mixing container. Add cool drinking water to the 16-ounce line on the container and mix. Drink all the liquid in the container. Then drink two (2) more 16-ounce containers of water over the next 1 hour.  You can take your medications as instructed during phone call with procedure center staff, with a sip of water up to 4 hours prior to your scheduled procedure.   You should not drink or take anything by mouth 2 hours prior to your procedure.  Your stools should have a clear to see-through cloudy yellow appearance with no formed substance. If your stools are darker or have formed substance, please call the  office and speak with a nurse who will get further instructions from your physician.  Please make sure to drink the full prep.    Prepping times and instructions can be altered depending on time of procedure.  Please call office and ask to speak to a nurse to discuss.    Special Instructions    Kidney disease - If you have any problems with kidney disease. You should not take this prep. Please call my office, and we will prescribe a safer alternative    Frequently Asked Questions    What is a colonoscopy?   A colonoscopy is a procedure where we can examine your large intestine for abnormalities. We use an endoscope which is a flexible tube as thick as your finger, that has a camera and light at the tip     How long is the colon?   The average length of the colon is 4 to 5 feet.     Why do I need to take the preparation and clear liquid diet?  The most important part in a successful exam is the preparation. It is important to clean your colon completely prior to the exam. If there is still remaining stool in the colon, it can prolong your procedure, and we may not be able to visualize the entire colon and lesions could be missed. If you have a substantial amount of stool remaining, the procedure may be terminated, and a repeat or alternative prep may be required.    For your information, studies have shown taking Suprep the evening prior and morning of the procedure as 2 separate doses help improve the cleansing of the colon. Therefore, prepping times and instructions can be altered depending on time of procedure.    Is there an alternative to the Suprep?  In the past, most preps were performed with Fleets Phospho-Soda. However recent studies have shown the risk of causing permanent kidney damage/failure. Due to the risk of permanent kidney damage with the Fleet Phospho-Soda, most gastroenterologists have stopped using it.   There are alternatives to Suprep such as Nulytely and Moviprep.  If you would like the  alternative, then please contact my office.      If people have problems taking the prep at home, there is an alternative where you can take the entire preparation in the hospital the day of the procedure.     Can I take all my medications?  Most medications can be continued without problems.  If you have questions, please call the office. Blood thinners and anti-diabetic medications may need modification prior as detailed above.      What can I expect the day of the procedure?  You will arrive at the facility where you are scheduled. We have you arrive early since you will need to fill out your information. An IV will be placed so we can give you medications. I will talk to you and answer any questions you may have before the procedure. Once inside the procedure room, sedation will be given to help you relax.    You will usually lie on your left side. I will then advance the scope to the end of your large intestine. During the procedure it is normal to feel some pressure, bloating or cramping. Careful examination will be performed throughout your colon. The entire procedure takes approximately 30 minutes, however this can be prolonged in complicated cases.    Once the procedure is complete, you will be brought to the recovery room until you are stable to leave. You should plan on being at the procedure site for 2 to 3 hours.     Will I be completely sedated for the procedure?  Choice of sedation - Moderate Intravenous OR Monitored Anesthesia Care, is based upon past medical history and current medication use. The doctor performing the procedure will make this decision. The goal is to keep you comfortable during the procedure.    If you have questions regarding sedation, please call the office to discuss further.  In most cases patients receive conscious sedation, meaning that they will be in a “twilight sleep”. They will breathe on their own and will be able to follow commands. Since most of the discomfort is with  inserting the scope, this is when the heaviest sedation is used. It is not unusual for people to be awake for part of the exam. The goal of the sedation is to keep you as comfortable as possible.     Patients that have a history of taking chronic medications such as pain medication, anti-anxiety medications, or alcohol use may build up a tolerance to the sedation. This may make it difficult to fully sedate you for the procedure.    What if something abnormal is found during the colonoscopy?  Most polyps can be removed at the time of the colonoscopy. Biopsies, tissue samples, can be obtained during the exam.     What are polyps?  Polyps are abnormal growth of colon tissue. A majority are benign or non-cancerous. All polyps that are removed are sent to the lab for analysis. Some polyps are precancerous, which is why it is important to remove them while they are small and non-cancerous. Polyps can range from a couple of millimeters to a couple of cm.     How are the polyps removed?  The polyps can be removed by biopsy instruments. Some polyps are removed with a wire snare and cautery. Cautery produces an electrical current to help burn and remove the polyps. You should not feel any pain with removal of the polyps. The polyps are then retrieved and sent to a lab to be analyzed.     What will happen after the procedure?  I will discuss the findings with you prior to discharge. You will receive a phone call or letter from the office within 10-14 business days with the results and recommendations. Your family history, your personal history, and the number/size and type of polyps found on most recent exam will determine when you may need a repeat colonoscopy.     Even if you feel alert after the procedure, your judgment and reflexes may be impaired the rest of the day. You should not drive, work heavy machinery, or perform any other task that requires your full attention.    You may have bloating and cramping after the  exam. This usually is improved with passing of gas.    Normally you may resume a regular diet after the procedure is completed. If you are on a blood thinner, this may be held if a large polyp is removed. You will be provided with clear instructions regarding when to resume your blood thinner medications.    Why do I need someone to accompany me to the exam?  Because you are given a sedative, you need someone you know to drive you home after the exam. If you are taking a bus, taxi or van service a responsible adult you know must accompany you. If this is a problem, the colonoscopy can be attempted without any sedation, or inpatient observation may be recommended.     What are the complications of the exam?  Colonoscopies are relatively safe, however complications can occur. Some, but not all, of the risk are discussed below.  Some patients may have an adverse reaction with the sedation or complications from heart and lung disease. There is also the risk of causing bleeding and perforation (poking a hole in the colon). If these complications do occur, they may need surgical treatment rarely. There is also a risk of missed lesions.     After the procedure, if you have any abdominal pain, fever, chills, or rectal bleeding it is important to notify me or seek immediate medical attention.  It is important to know that bleeding can occur even several days after the procedure.             INSURANCE COVERAGE REGARDING PAYMENT  FOR YOUR COLONOSCOPY        Colon Cancer is the second leading cause of death among cancers, per the American Cancer Society. It is preventable. Early detection is the key. Your doctor will determine which tests need to be done for prevention and/or treatment. However, colonoscopies can be performed for several reasons:     Screening To screen for any problems within the colon. In this case, the patient is not symptomatic and does not have a personal history of previous colon cancer/condition or  abnormal findings. Billed as screening.   Surveillance To monitor for a previously diagnosed colon condition (such as polyps) or when performed at more frequent intervals than every ten years because the patient has a personal/family history of colonic polyps or colon cancer. Billed as diagnostic.   Diagnostic Patient with symptoms, used to evaluate the colon. Billed as diagnostic.       If during a screening colonoscopy, our physician finds an abnormality, performs a biopsy or polypectomy (removal of polyp), your insurance company may consider the procedure to be a diagnostic exam and no longer a screening procedure.     Every insurance company is different. We encourage you to call your insurance company regarding plan benefits. Generally, screening benefits and diagnostic benefits may be paid at different levels. Charges associated with anesthesia or type of facility may also be processed differently. This varies with each insurance company, so we want you to be aware of this prior to your procedure. You do not have to call your insurance company if you have Medicare. For an estimate of potential charges, you may call the [facility] Patient Contact Center at [phone], option 5.     The authorization staff at [facility] will contact your insurance company to check precertification requirements for your colonoscopy. However, precertification, which serves as notification is never a guarantee of payment. If you have questions regarding precertification for your procedure, please contact your insurance company.     Ambulatory

## 2024-09-05 DIAGNOSIS — D64.9 ANEMIA, UNSPECIFIED: ICD-10-CM

## 2024-09-09 ENCOUNTER — APPOINTMENT (OUTPATIENT)
Dept: INTERNAL MEDICINE | Facility: CLINIC | Age: 34
End: 2024-09-09
Payer: MEDICAID

## 2024-09-09 PROCEDURE — 36415 COLL VENOUS BLD VENIPUNCTURE: CPT

## 2024-09-10 LAB
25(OH)D3 SERPL-MCNC: 21.3 NG/ML
ALBUMIN SERPL ELPH-MCNC: 4.2 G/DL
ALP BLD-CCNC: 82 U/L
ALT SERPL-CCNC: 28 U/L
ANION GAP SERPL CALC-SCNC: 14 MMOL/L
AST SERPL-CCNC: 26 U/L
BILIRUB SERPL-MCNC: 0.5 MG/DL
BUN SERPL-MCNC: 12 MG/DL
CALCIUM SERPL-MCNC: 9.7 MG/DL
CHLORIDE SERPL-SCNC: 105 MMOL/L
CHOLEST SERPL-MCNC: 260 MG/DL
CO2 SERPL-SCNC: 20 MMOL/L
CREAT SERPL-MCNC: 0.61 MG/DL
EGFR: 122 ML/MIN/1.73M2
ESTIMATED AVERAGE GLUCOSE: 108 MG/DL
FOLATE SERPL-MCNC: 9.8 NG/ML
GLUCOSE SERPL-MCNC: 91 MG/DL
HBA1C MFR BLD HPLC: 5.4 %
HCT VFR BLD CALC: 38.6 %
HDLC SERPL-MCNC: 50 MG/DL
HGB BLD-MCNC: 11.7 G/DL
IRON SATN MFR SERPL: 16 %
IRON SERPL-MCNC: 77 UG/DL
LDLC SERPL CALC-MCNC: 176 MG/DL
MCHC RBC-ENTMCNC: 27.5 PG
MCHC RBC-ENTMCNC: 30.3 GM/DL
MCV RBC AUTO: 90.8 FL
NONHDLC SERPL-MCNC: 210 MG/DL
PLATELET # BLD AUTO: 207 K/UL
POTASSIUM SERPL-SCNC: 4.4 MMOL/L
PROT SERPL-MCNC: 7.9 G/DL
RBC # BLD: 4.25 M/UL
RBC # FLD: 14.3 %
SODIUM SERPL-SCNC: 139 MMOL/L
T4 FREE SERPL-MCNC: 1.2 NG/DL
TIBC SERPL-MCNC: 485 UG/DL
TRIGL SERPL-MCNC: 182 MG/DL
TSH SERPL-ACNC: 4.39 UIU/ML
UIBC SERPL-MCNC: 409 UG/DL
VIT B12 SERPL-MCNC: 416 PG/ML
WBC # FLD AUTO: 6.56 K/UL

## 2024-09-16 ENCOUNTER — APPOINTMENT (OUTPATIENT)
Dept: INTERNAL MEDICINE | Facility: CLINIC | Age: 34
End: 2024-09-16
Payer: MEDICAID

## 2024-09-16 VITALS
OXYGEN SATURATION: 98 % | TEMPERATURE: 98.1 F | HEART RATE: 80 BPM | HEIGHT: 62 IN | SYSTOLIC BLOOD PRESSURE: 110 MMHG | BODY MASS INDEX: 30.73 KG/M2 | DIASTOLIC BLOOD PRESSURE: 76 MMHG | WEIGHT: 167 LBS

## 2024-09-16 DIAGNOSIS — R73.01 IMPAIRED FASTING GLUCOSE: ICD-10-CM

## 2024-09-16 DIAGNOSIS — E03.8 OTHER SPECIFIED HYPOTHYROIDISM: ICD-10-CM

## 2024-09-16 DIAGNOSIS — Z00.00 ENCOUNTER FOR GENERAL ADULT MEDICAL EXAMINATION W/OUT ABNORMAL FINDINGS: ICD-10-CM

## 2024-09-16 DIAGNOSIS — D64.9 ANEMIA, UNSPECIFIED: ICD-10-CM

## 2024-09-16 DIAGNOSIS — E78.00 PURE HYPERCHOLESTEROLEMIA, UNSPECIFIED: ICD-10-CM

## 2024-09-16 DIAGNOSIS — E55.9 VITAMIN D DEFICIENCY, UNSPECIFIED: ICD-10-CM

## 2024-09-16 PROCEDURE — G0008: CPT

## 2024-09-16 PROCEDURE — 99395 PREV VISIT EST AGE 18-39: CPT | Mod: 25

## 2024-09-16 PROCEDURE — 90656 IIV3 VACC NO PRSV 0.5 ML IM: CPT

## 2024-09-16 PROCEDURE — 99214 OFFICE O/P EST MOD 30 MIN: CPT | Mod: 25

## 2024-09-16 PROCEDURE — 93000 ELECTROCARDIOGRAM COMPLETE: CPT

## 2024-09-16 NOTE — HEALTH RISK ASSESSMENT
[Good] : ~his/her~  mood as  good [0] : 2) Feeling down, depressed, or hopeless: Not at all (0) [[id number]] : 0 [Never] : Never

## 2024-09-16 NOTE — HISTORY OF PRESENT ILLNESS
[FreeTextEntry1] : 33-year-old female CPE History of hyperlipidemia, high glucose, anemia, subclinical hypothyroid, and vitamin D deficiency [de-identified] :  See "CC" sheet

## 2024-09-16 NOTE — ASSESSMENT
[FreeTextEntry1] : Discussed with patient importance of healthy diet, regular exercise (> 2.5 hours/week), and maintaining healthy weight. CBC results from last week were reviewed with patient. TSH and free T4 results from last week were reviewed with patient. Follow 25 OH vitamin D Hemoglobin A1c result from last week was reviewed with patient. urine alb  Hyperlipidemia.  want LDL<100.  want trig<150 COVID-19  booster vaccine is recommended once a year in fall. Follow-up in 6 months List of [facility] given to patient.  Reminded patient to follow-up with gynecologist for GYN exam once every 2 years

## 2024-09-17 LAB
CREAT SPEC-SCNC: 70 MG/DL
MICROALBUMIN 24H UR DL<=1MG/L-MCNC: <1.2 MG/DL
MICROALBUMIN/CREAT 24H UR-RTO: NORMAL MG/G

## 2025-02-25 DIAGNOSIS — D64.9 ANEMIA, UNSPECIFIED: ICD-10-CM

## 2025-03-03 ENCOUNTER — APPOINTMENT (OUTPATIENT)
Dept: INTERNAL MEDICINE | Facility: CLINIC | Age: 35
End: 2025-03-03

## 2025-03-05 ENCOUNTER — APPOINTMENT (OUTPATIENT)
Dept: INTERNAL MEDICINE | Facility: CLINIC | Age: 35
End: 2025-03-05
Payer: MEDICAID

## 2025-03-05 PROCEDURE — 36415 COLL VENOUS BLD VENIPUNCTURE: CPT

## 2025-03-06 LAB
25(OH)D3 SERPL-MCNC: 23.1 NG/ML
ALBUMIN SERPL ELPH-MCNC: 4.1 G/DL
ALP BLD-CCNC: 93 U/L
ALT SERPL-CCNC: 23 U/L
ANION GAP SERPL CALC-SCNC: 12 MMOL/L
AST SERPL-CCNC: 19 U/L
BILIRUB SERPL-MCNC: 0.4 MG/DL
BUN SERPL-MCNC: 14 MG/DL
CALCIUM SERPL-MCNC: 9.3 MG/DL
CHLORIDE SERPL-SCNC: 105 MMOL/L
CHOLEST SERPL-MCNC: 233 MG/DL
CO2 SERPL-SCNC: 22 MMOL/L
CREAT SERPL-MCNC: 0.64 MG/DL
EGFR: 120 ML/MIN/1.73M2
ESTIMATED AVERAGE GLUCOSE: 114 MG/DL
GLUCOSE SERPL-MCNC: 87 MG/DL
HBA1C MFR BLD HPLC: 5.6 %
HDLC SERPL-MCNC: 46 MG/DL
LDLC SERPL CALC-MCNC: 152 MG/DL
NONHDLC SERPL-MCNC: 187 MG/DL
POTASSIUM SERPL-SCNC: 4.3 MMOL/L
PROT SERPL-MCNC: 7.7 G/DL
SODIUM SERPL-SCNC: 139 MMOL/L
T4 FREE SERPL-MCNC: 1.1 NG/DL
TRIGL SERPL-MCNC: 192 MG/DL
TSH SERPL-ACNC: 4.86 UIU/ML

## 2025-03-10 ENCOUNTER — APPOINTMENT (OUTPATIENT)
Dept: INTERNAL MEDICINE | Facility: CLINIC | Age: 35
End: 2025-03-10
Payer: MEDICAID

## 2025-03-10 VITALS
HEIGHT: 62 IN | SYSTOLIC BLOOD PRESSURE: 110 MMHG | BODY MASS INDEX: 30.55 KG/M2 | WEIGHT: 166 LBS | OXYGEN SATURATION: 98 % | DIASTOLIC BLOOD PRESSURE: 80 MMHG | HEART RATE: 68 BPM

## 2025-03-10 DIAGNOSIS — E78.00 PURE HYPERCHOLESTEROLEMIA, UNSPECIFIED: ICD-10-CM

## 2025-03-10 DIAGNOSIS — E55.9 VITAMIN D DEFICIENCY, UNSPECIFIED: ICD-10-CM

## 2025-03-10 DIAGNOSIS — E03.8 OTHER SPECIFIED HYPOTHYROIDISM: ICD-10-CM

## 2025-03-10 DIAGNOSIS — R73.01 IMPAIRED FASTING GLUCOSE: ICD-10-CM

## 2025-03-10 DIAGNOSIS — D64.9 ANEMIA, UNSPECIFIED: ICD-10-CM

## 2025-03-10 PROCEDURE — 99214 OFFICE O/P EST MOD 30 MIN: CPT

## 2025-03-10 PROCEDURE — G2211 COMPLEX E/M VISIT ADD ON: CPT | Mod: NC

## 2025-04-16 NOTE — ED PROVIDER NOTE - PRINCIPAL DIAGNOSIS
Discontinue Regimen: Minoxidil 1.25mg QD. Pt stopped taking due to experiencing weight gain.
Detail Level: Zone
Render In Strict Bullet Format?: No
Abdominal pain

## 2025-09-17 DIAGNOSIS — D64.9 ANEMIA, UNSPECIFIED: ICD-10-CM

## (undated) DEVICE — ELCTR GROUNDING PAD ADULT COVIDIEN

## (undated) DEVICE — SUT VICRYL 0 27" OS-6 UNDYED

## (undated) DEVICE — MIDAS REX LEGEND MATCH HEAD FLUTED LG BORE 3.0MM X 14CM

## (undated) DEVICE — DRAIN JACKSON PRATT 3 SPRING RESERVOIR W 10FR PVC DRAIN

## (undated) DEVICE — SUT VICRYL 2-0 27" FS-1 UNDYED

## (undated) DEVICE — NDL SPINAL 18G X 3.5" (PINK)

## (undated) DEVICE — SUT VICRYL 0 18" CT-1 UNDYED (POP-OFF)

## (undated) DEVICE — DRAPE 3/4 SHEET 52X76"

## (undated) DEVICE — CANISTER DISPOSABLE THIN WALL 3000CC

## (undated) DEVICE — DRAPE TOWEL WHITE 17" X 27"

## (undated) DEVICE — DRAPE SURGICAL #1010

## (undated) DEVICE — TAPE SILK 3"

## (undated) DEVICE — POSITIONER CUSHION INSERT PRONE VIEW LG

## (undated) DEVICE — GLV 7.5 PROTEXIS (WHITE)

## (undated) DEVICE — MIDAS REX LEGEND BALL FLUTED LG BORE 5.0MM X 14CM

## (undated) DEVICE — LABELS BLANK W PEN

## (undated) DEVICE — DRSG CURITY GAUZE SPONGE 4 X 4" 12-PLY

## (undated) DEVICE — PROTECTOR HEEL / ELBOW FLUFFY

## (undated) DEVICE — ELCTR BOVIE TIP BLADE INSULATED 2.75" EDGE

## (undated) DEVICE — MIDAS REX LEGEND LUBRICANT DIFFUSER CARTRIDGE

## (undated) DEVICE — GOWN XL

## (undated) DEVICE — DRSG BENZOIN 0.6CC

## (undated) DEVICE — POSITIONER STRAP ARMBOARD VELCRO TS-30

## (undated) DEVICE — PACK NEURO MINOR

## (undated) DEVICE — SOL IRR POUR H2O 1500ML

## (undated) DEVICE — SUCTION YANKAUER NO CONTROL VENT

## (undated) DEVICE — SUT VICRYL 3-0 18" SH UNDYED (POP-OFF)

## (undated) DEVICE — DRSG STERISTRIPS 0.5 X 4"

## (undated) DEVICE — SYR ASEPTO

## (undated) DEVICE — WARMING BLANKET UPPER ADULT

## (undated) DEVICE — ELCTR BOVIE PENCIL BLADE 10FT

## (undated) DEVICE — DRSG DERMABOND PRINEO 60CM

## (undated) DEVICE — VENODYNE/SCD SLEEVE CALF MEDIUM

## (undated) DEVICE — Device

## (undated) DEVICE — SOL ANTI FOG

## (undated) DEVICE — SPONGE DISSECTOR PEANUT

## (undated) DEVICE — SUT VICRYL 1 36" CTX UNDYED

## (undated) DEVICE — SOL IRR POUR NS 0.9% 1500ML

## (undated) DEVICE — DRSG TEGADERM 1.75X1.75"

## (undated) DEVICE — SPONGE X-RAY 4X8"

## (undated) DEVICE — STAPLER SKIN VISI-STAT 35 WIDE

## (undated) DEVICE — SUT MONOCRYL 3-0 18" PS-2 UNDYED

## (undated) DEVICE — DRAPE LAPAROTOMY W VELCRO CORD TABS

## (undated) DEVICE — MIDAS REX LEGEND BALL FLUTED SM BORE 4.0MM X 10CM

## (undated) DEVICE — LIJ-KMEDIC KERRISON RONGUER: Type: DURABLE MEDICAL EQUIPMENT

## (undated) DEVICE — DRAPE MINOR PROCEDURE

## (undated) DEVICE — GLV 7.5 PROTEXIS (BLUE)

## (undated) DEVICE — NDL HYPO SAFE 21G X 1.5" (GREEN)

## (undated) DEVICE — FOLEY TRAY 16FR 5CC LF UMETER CLOSED

## (undated) DEVICE — PREP DURAPREP 26CC

## (undated) DEVICE — DRSG TELFA 3 X 8

## (undated) DEVICE — DRAPE INSTRUMENT POUCH 6.75" X 11"